# Patient Record
Sex: FEMALE | Race: WHITE | NOT HISPANIC OR LATINO | URBAN - METROPOLITAN AREA
[De-identification: names, ages, dates, MRNs, and addresses within clinical notes are randomized per-mention and may not be internally consistent; named-entity substitution may affect disease eponyms.]

---

## 2017-01-20 ENCOUNTER — OUTPATIENT (OUTPATIENT)
Dept: OUTPATIENT SERVICES | Facility: HOSPITAL | Age: 67
LOS: 1 days | End: 2017-01-20
Payer: COMMERCIAL

## 2017-01-20 ENCOUNTER — APPOINTMENT (OUTPATIENT)
Dept: CT IMAGING | Facility: CLINIC | Age: 67
End: 2017-01-20

## 2017-01-20 DIAGNOSIS — H26.9 UNSPECIFIED CATARACT: Chronic | ICD-10-CM

## 2017-01-20 DIAGNOSIS — D24.2 BENIGN NEOPLASM OF LEFT BREAST: Chronic | ICD-10-CM

## 2017-01-20 DIAGNOSIS — Z00.8 ENCOUNTER FOR OTHER GENERAL EXAMINATION: ICD-10-CM

## 2017-01-20 PROCEDURE — 62323 NJX INTERLAMINAR LMBR/SAC: CPT

## 2017-01-24 ENCOUNTER — OTHER (OUTPATIENT)
Age: 67
End: 2017-01-24

## 2017-01-24 ENCOUNTER — RX RENEWAL (OUTPATIENT)
Age: 67
End: 2017-01-24

## 2017-01-26 ENCOUNTER — APPOINTMENT (OUTPATIENT)
Dept: ORTHOPEDIC SURGERY | Facility: CLINIC | Age: 67
End: 2017-01-26

## 2017-01-26 VITALS
BODY MASS INDEX: 26.46 KG/M2 | SYSTOLIC BLOOD PRESSURE: 107 MMHG | WEIGHT: 155 LBS | HEART RATE: 82 BPM | HEIGHT: 64 IN | DIASTOLIC BLOOD PRESSURE: 65 MMHG

## 2017-02-13 ENCOUNTER — OTHER (OUTPATIENT)
Age: 67
End: 2017-02-13

## 2017-02-16 ENCOUNTER — APPOINTMENT (OUTPATIENT)
Dept: ORTHOPEDIC SURGERY | Facility: CLINIC | Age: 67
End: 2017-02-16

## 2017-02-16 VITALS
BODY MASS INDEX: 26.46 KG/M2 | SYSTOLIC BLOOD PRESSURE: 110 MMHG | HEIGHT: 64 IN | WEIGHT: 155 LBS | DIASTOLIC BLOOD PRESSURE: 71 MMHG | HEART RATE: 84 BPM

## 2017-02-16 DIAGNOSIS — M47.816 SPONDYLOSIS W/OUT MYELOPATHY OR RADICULOPATHY, LUMBAR REGION: ICD-10-CM

## 2017-02-17 ENCOUNTER — APPOINTMENT (OUTPATIENT)
Dept: ORTHOPEDIC SURGERY | Facility: CLINIC | Age: 67
End: 2017-02-17

## 2017-03-01 ENCOUNTER — APPOINTMENT (OUTPATIENT)
Dept: DERMATOLOGY | Facility: CLINIC | Age: 67
End: 2017-03-01

## 2017-03-02 ENCOUNTER — OUTPATIENT (OUTPATIENT)
Dept: OUTPATIENT SERVICES | Facility: HOSPITAL | Age: 67
LOS: 1 days | End: 2017-03-02
Payer: COMMERCIAL

## 2017-03-02 ENCOUNTER — APPOINTMENT (OUTPATIENT)
Dept: ULTRASOUND IMAGING | Facility: CLINIC | Age: 67
End: 2017-03-02

## 2017-03-02 ENCOUNTER — APPOINTMENT (OUTPATIENT)
Dept: CT IMAGING | Facility: CLINIC | Age: 67
End: 2017-03-02

## 2017-03-02 DIAGNOSIS — D24.2 BENIGN NEOPLASM OF LEFT BREAST: Chronic | ICD-10-CM

## 2017-03-02 DIAGNOSIS — Z00.8 ENCOUNTER FOR OTHER GENERAL EXAMINATION: ICD-10-CM

## 2017-03-02 DIAGNOSIS — H26.9 UNSPECIFIED CATARACT: Chronic | ICD-10-CM

## 2017-03-02 PROCEDURE — 20552 NJX 1/MLT TRIGGER POINT 1/2: CPT

## 2017-03-02 PROCEDURE — 77012 CT SCAN FOR NEEDLE BIOPSY: CPT

## 2017-03-02 PROCEDURE — G0260: CPT

## 2017-03-06 ENCOUNTER — APPOINTMENT (OUTPATIENT)
Dept: DERMATOLOGY | Facility: CLINIC | Age: 67
End: 2017-03-06

## 2017-03-08 ENCOUNTER — APPOINTMENT (OUTPATIENT)
Dept: PULMONOLOGY | Facility: CLINIC | Age: 67
End: 2017-03-08

## 2017-03-08 VITALS — HEART RATE: 85 BPM | DIASTOLIC BLOOD PRESSURE: 58 MMHG | SYSTOLIC BLOOD PRESSURE: 116 MMHG | OXYGEN SATURATION: 95 %

## 2017-03-08 VITALS — BODY MASS INDEX: 26.78 KG/M2 | WEIGHT: 156 LBS

## 2017-03-08 RX ORDER — ORPHENADRINE CITRATE 100 MG/1
100 TABLET, EXTENDED RELEASE ORAL
Qty: 10 | Refills: 0 | Status: DISCONTINUED | COMMUNITY
Start: 2016-11-14

## 2017-03-08 RX ORDER — AMOXICILLIN 500 MG/1
500 CAPSULE ORAL
Qty: 20 | Refills: 0 | Status: DISCONTINUED | COMMUNITY
Start: 2017-01-26

## 2017-03-08 RX ORDER — NAPROXEN 500 MG/1
500 TABLET ORAL
Qty: 20 | Refills: 0 | Status: DISCONTINUED | COMMUNITY
Start: 2016-11-14

## 2017-03-30 ENCOUNTER — APPOINTMENT (OUTPATIENT)
Dept: ORTHOPEDIC SURGERY | Facility: CLINIC | Age: 67
End: 2017-03-30

## 2017-05-18 ENCOUNTER — FORM ENCOUNTER (OUTPATIENT)
Age: 67
End: 2017-05-18

## 2017-05-18 ENCOUNTER — APPOINTMENT (OUTPATIENT)
Dept: ORTHOPEDIC SURGERY | Facility: CLINIC | Age: 67
End: 2017-05-18

## 2017-05-18 VITALS
HEART RATE: 88 BPM | HEIGHT: 64 IN | WEIGHT: 156 LBS | BODY MASS INDEX: 26.63 KG/M2 | DIASTOLIC BLOOD PRESSURE: 67 MMHG | SYSTOLIC BLOOD PRESSURE: 97 MMHG

## 2017-05-18 DIAGNOSIS — M70.61 TROCHANTERIC BURSITIS, RIGHT HIP: ICD-10-CM

## 2017-05-18 DIAGNOSIS — M54.41 LUMBAGO WITH SCIATICA, RIGHT SIDE: ICD-10-CM

## 2017-05-19 ENCOUNTER — APPOINTMENT (OUTPATIENT)
Dept: CT IMAGING | Facility: CLINIC | Age: 67
End: 2017-05-19

## 2017-05-19 ENCOUNTER — APPOINTMENT (OUTPATIENT)
Dept: MRI IMAGING | Facility: CLINIC | Age: 67
End: 2017-05-19

## 2017-05-19 ENCOUNTER — OUTPATIENT (OUTPATIENT)
Dept: OUTPATIENT SERVICES | Facility: HOSPITAL | Age: 67
LOS: 1 days | End: 2017-05-19
Payer: COMMERCIAL

## 2017-05-19 ENCOUNTER — OUTPATIENT (OUTPATIENT)
Dept: OUTPATIENT SERVICES | Facility: HOSPITAL | Age: 67
LOS: 1 days | End: 2017-05-19

## 2017-05-19 DIAGNOSIS — M47.816 SPONDYLOSIS WITHOUT MYELOPATHY OR RADICULOPATHY, LUMBAR REGION: ICD-10-CM

## 2017-05-19 DIAGNOSIS — H26.9 UNSPECIFIED CATARACT: Chronic | ICD-10-CM

## 2017-05-19 DIAGNOSIS — M54.41 LUMBAGO WITH SCIATICA, RIGHT SIDE: ICD-10-CM

## 2017-05-19 DIAGNOSIS — D24.2 BENIGN NEOPLASM OF LEFT BREAST: Chronic | ICD-10-CM

## 2017-05-19 PROCEDURE — 72131 CT LUMBAR SPINE W/O DYE: CPT

## 2017-05-30 ENCOUNTER — APPOINTMENT (OUTPATIENT)
Dept: ORTHOPEDIC SURGERY | Facility: CLINIC | Age: 67
End: 2017-05-30

## 2017-06-15 ENCOUNTER — APPOINTMENT (OUTPATIENT)
Dept: PULMONOLOGY | Facility: CLINIC | Age: 67
End: 2017-06-15

## 2017-06-15 VITALS
OXYGEN SATURATION: 96 % | HEIGHT: 64 IN | DIASTOLIC BLOOD PRESSURE: 76 MMHG | SYSTOLIC BLOOD PRESSURE: 124 MMHG | HEART RATE: 78 BPM | BODY MASS INDEX: 27.12 KG/M2

## 2017-06-15 VITALS — BODY MASS INDEX: 27.12 KG/M2 | WEIGHT: 158 LBS

## 2017-07-31 ENCOUNTER — OUTPATIENT (OUTPATIENT)
Dept: OUTPATIENT SERVICES | Facility: HOSPITAL | Age: 67
LOS: 1 days | End: 2017-07-31
Payer: COMMERCIAL

## 2017-07-31 DIAGNOSIS — M47.816 SPONDYLOSIS WITHOUT MYELOPATHY OR RADICULOPATHY, LUMBAR REGION: ICD-10-CM

## 2017-07-31 DIAGNOSIS — D24.2 BENIGN NEOPLASM OF LEFT BREAST: Chronic | ICD-10-CM

## 2017-07-31 DIAGNOSIS — Z51.89 ENCOUNTER FOR OTHER SPECIFIED AFTERCARE: ICD-10-CM

## 2017-07-31 DIAGNOSIS — H26.9 UNSPECIFIED CATARACT: Chronic | ICD-10-CM

## 2017-07-31 DIAGNOSIS — M54.5 LOW BACK PAIN: ICD-10-CM

## 2017-08-17 PROCEDURE — 97010 HOT OR COLD PACKS THERAPY: CPT

## 2017-08-17 PROCEDURE — 97110 THERAPEUTIC EXERCISES: CPT

## 2017-08-17 PROCEDURE — 97140 MANUAL THERAPY 1/> REGIONS: CPT

## 2017-09-06 ENCOUNTER — APPOINTMENT (OUTPATIENT)
Dept: DERMATOLOGY | Facility: CLINIC | Age: 67
End: 2017-09-06
Payer: COMMERCIAL

## 2017-09-06 PROCEDURE — 99213 OFFICE O/P EST LOW 20 MIN: CPT

## 2017-09-08 ENCOUNTER — OTHER (OUTPATIENT)
Age: 67
End: 2017-09-08

## 2017-09-20 ENCOUNTER — APPOINTMENT (OUTPATIENT)
Dept: MAMMOGRAPHY | Facility: CLINIC | Age: 67
End: 2017-09-20

## 2017-09-20 ENCOUNTER — OUTPATIENT (OUTPATIENT)
Dept: OUTPATIENT SERVICES | Facility: HOSPITAL | Age: 67
LOS: 1 days | End: 2017-09-20
Payer: COMMERCIAL

## 2017-09-20 DIAGNOSIS — Z00.8 ENCOUNTER FOR OTHER GENERAL EXAMINATION: ICD-10-CM

## 2017-09-20 DIAGNOSIS — H26.9 UNSPECIFIED CATARACT: Chronic | ICD-10-CM

## 2017-09-20 DIAGNOSIS — D24.2 BENIGN NEOPLASM OF LEFT BREAST: Chronic | ICD-10-CM

## 2017-09-20 PROCEDURE — 77063 BREAST TOMOSYNTHESIS BI: CPT | Mod: 26

## 2017-09-20 PROCEDURE — 77067 SCR MAMMO BI INCL CAD: CPT

## 2017-09-20 PROCEDURE — 77063 BREAST TOMOSYNTHESIS BI: CPT

## 2017-09-20 PROCEDURE — 76641 ULTRASOUND BREAST COMPLETE: CPT | Mod: 26,50

## 2017-09-20 PROCEDURE — G0202: CPT | Mod: 26

## 2017-09-20 PROCEDURE — 76641 ULTRASOUND BREAST COMPLETE: CPT

## 2017-11-06 ENCOUNTER — APPOINTMENT (OUTPATIENT)
Dept: PULMONOLOGY | Facility: CLINIC | Age: 67
End: 2017-11-06
Payer: COMMERCIAL

## 2017-11-06 VITALS
RESPIRATION RATE: 16 BRPM | HEART RATE: 77 BPM | SYSTOLIC BLOOD PRESSURE: 124 MMHG | DIASTOLIC BLOOD PRESSURE: 80 MMHG | OXYGEN SATURATION: 97 %

## 2017-11-06 VITALS — WEIGHT: 150 LBS | BODY MASS INDEX: 25.75 KG/M2

## 2017-11-06 PROCEDURE — 94010 BREATHING CAPACITY TEST: CPT

## 2017-11-06 PROCEDURE — 99214 OFFICE O/P EST MOD 30 MIN: CPT | Mod: 25

## 2017-11-08 ENCOUNTER — OUTPATIENT (OUTPATIENT)
Dept: OUTPATIENT SERVICES | Facility: HOSPITAL | Age: 67
LOS: 1 days | End: 2017-11-08
Payer: COMMERCIAL

## 2017-11-08 DIAGNOSIS — D24.2 BENIGN NEOPLASM OF LEFT BREAST: Chronic | ICD-10-CM

## 2017-11-08 DIAGNOSIS — H26.9 UNSPECIFIED CATARACT: Chronic | ICD-10-CM

## 2017-11-08 DIAGNOSIS — M54.16 RADICULOPATHY, LUMBAR REGION: ICD-10-CM

## 2017-11-08 DIAGNOSIS — Z51.89 ENCOUNTER FOR OTHER SPECIFIED AFTERCARE: ICD-10-CM

## 2017-11-27 ENCOUNTER — RX RENEWAL (OUTPATIENT)
Age: 67
End: 2017-11-27

## 2017-12-07 PROCEDURE — 97163 PT EVAL HIGH COMPLEX 45 MIN: CPT

## 2017-12-07 PROCEDURE — 97010 HOT OR COLD PACKS THERAPY: CPT

## 2017-12-07 PROCEDURE — 97110 THERAPEUTIC EXERCISES: CPT

## 2017-12-11 ENCOUNTER — RESULT REVIEW (OUTPATIENT)
Age: 67
End: 2017-12-11

## 2017-12-14 ENCOUNTER — APPOINTMENT (OUTPATIENT)
Dept: RADIOLOGY | Facility: CLINIC | Age: 67
End: 2017-12-14

## 2017-12-14 ENCOUNTER — APPOINTMENT (OUTPATIENT)
Dept: ULTRASOUND IMAGING | Facility: CLINIC | Age: 67
End: 2017-12-14

## 2017-12-14 ENCOUNTER — OUTPATIENT (OUTPATIENT)
Dept: OUTPATIENT SERVICES | Facility: HOSPITAL | Age: 67
LOS: 1 days | End: 2017-12-14
Payer: COMMERCIAL

## 2017-12-14 DIAGNOSIS — Z00.8 ENCOUNTER FOR OTHER GENERAL EXAMINATION: ICD-10-CM

## 2017-12-14 DIAGNOSIS — D24.2 BENIGN NEOPLASM OF LEFT BREAST: Chronic | ICD-10-CM

## 2017-12-14 DIAGNOSIS — H26.9 UNSPECIFIED CATARACT: Chronic | ICD-10-CM

## 2017-12-14 PROCEDURE — 76642 ULTRASOUND BREAST LIMITED: CPT | Mod: 26,RT

## 2017-12-14 PROCEDURE — 76642 ULTRASOUND BREAST LIMITED: CPT

## 2017-12-14 PROCEDURE — 77080 DXA BONE DENSITY AXIAL: CPT | Mod: 26

## 2017-12-14 PROCEDURE — 77080 DXA BONE DENSITY AXIAL: CPT

## 2017-12-19 DIAGNOSIS — M85.852 OTHER SPECIFIED DISORDERS OF BONE DENSITY AND STRUCTURE, LEFT THIGH: ICD-10-CM

## 2017-12-19 DIAGNOSIS — R92.8 OTHER ABNORMAL AND INCONCLUSIVE FINDINGS ON DIAGNOSTIC IMAGING OF BREAST: ICD-10-CM

## 2018-03-19 ENCOUNTER — APPOINTMENT (OUTPATIENT)
Dept: FAMILY MEDICINE | Facility: CLINIC | Age: 68
End: 2018-03-19

## 2018-04-19 ENCOUNTER — NON-APPOINTMENT (OUTPATIENT)
Age: 68
End: 2018-04-19

## 2018-04-19 ENCOUNTER — APPOINTMENT (OUTPATIENT)
Dept: FAMILY MEDICINE | Facility: CLINIC | Age: 68
End: 2018-04-19
Payer: COMMERCIAL

## 2018-04-19 VITALS
HEART RATE: 80 BPM | BODY MASS INDEX: 25.1 KG/M2 | HEIGHT: 64 IN | WEIGHT: 147 LBS | DIASTOLIC BLOOD PRESSURE: 80 MMHG | SYSTOLIC BLOOD PRESSURE: 122 MMHG

## 2018-04-19 DIAGNOSIS — K29.70 GASTRITIS, UNSPECIFIED, W/OUT BLEEDING: ICD-10-CM

## 2018-04-19 DIAGNOSIS — Z83.42 FAMILY HISTORY OF FAMILIAL HYPERCHOLESTEROLEMIA: ICD-10-CM

## 2018-04-19 DIAGNOSIS — Z82.49 FAMILY HISTORY OF ISCHEMIC HEART DISEASE AND OTHER DISEASES OF THE CIRCULATORY SYSTEM: ICD-10-CM

## 2018-04-19 PROCEDURE — 99387 INIT PM E/M NEW PAT 65+ YRS: CPT | Mod: 25

## 2018-04-19 PROCEDURE — 93000 ELECTROCARDIOGRAM COMPLETE: CPT

## 2018-04-19 RX ORDER — TIZANIDINE 2 MG/1
2 TABLET ORAL
Qty: 45 | Refills: 0 | Status: DISCONTINUED | COMMUNITY
Start: 2017-02-16 | End: 2018-04-19

## 2018-04-19 RX ORDER — KETOROLAC TROMETHAMINE 10 MG/1
10 TABLET, FILM COATED ORAL 3 TIMES DAILY
Qty: 15 | Refills: 0 | Status: DISCONTINUED | COMMUNITY
Start: 2017-01-26 | End: 2018-04-19

## 2018-04-19 RX ORDER — MELOXICAM 15 MG/1
15 TABLET ORAL
Qty: 15 | Refills: 0 | Status: DISCONTINUED | COMMUNITY
Start: 2017-01-24 | End: 2018-04-19

## 2018-04-27 LAB — HEMOCCULT STL QL IA: NEGATIVE

## 2018-05-11 ENCOUNTER — APPOINTMENT (OUTPATIENT)
Dept: PULMONOLOGY | Facility: CLINIC | Age: 68
End: 2018-05-11
Payer: COMMERCIAL

## 2018-05-11 VITALS
SYSTOLIC BLOOD PRESSURE: 110 MMHG | DIASTOLIC BLOOD PRESSURE: 74 MMHG | OXYGEN SATURATION: 99 % | HEART RATE: 72 BPM | RESPIRATION RATE: 16 BRPM

## 2018-05-11 VITALS — BODY MASS INDEX: 24.98 KG/M2 | WEIGHT: 145.5 LBS

## 2018-05-11 PROCEDURE — 94010 BREATHING CAPACITY TEST: CPT

## 2018-05-11 PROCEDURE — 99214 OFFICE O/P EST MOD 30 MIN: CPT | Mod: 25

## 2018-06-23 ENCOUNTER — APPOINTMENT (OUTPATIENT)
Dept: RADIOLOGY | Facility: CLINIC | Age: 68
End: 2018-06-23

## 2018-06-23 ENCOUNTER — OUTPATIENT (OUTPATIENT)
Dept: OUTPATIENT SERVICES | Facility: HOSPITAL | Age: 68
LOS: 1 days | End: 2018-06-23
Payer: COMMERCIAL

## 2018-06-23 DIAGNOSIS — M25.571 PAIN IN RIGHT ANKLE AND JOINTS OF RIGHT FOOT: ICD-10-CM

## 2018-06-23 DIAGNOSIS — D24.2 BENIGN NEOPLASM OF LEFT BREAST: Chronic | ICD-10-CM

## 2018-06-23 DIAGNOSIS — H26.9 UNSPECIFIED CATARACT: Chronic | ICD-10-CM

## 2018-06-23 PROCEDURE — 73610 X-RAY EXAM OF ANKLE: CPT

## 2018-06-23 PROCEDURE — 73610 X-RAY EXAM OF ANKLE: CPT | Mod: 26,RT

## 2018-06-24 ENCOUNTER — TRANSCRIPTION ENCOUNTER (OUTPATIENT)
Age: 68
End: 2018-06-24

## 2018-06-28 ENCOUNTER — APPOINTMENT (OUTPATIENT)
Dept: ORTHOPEDIC SURGERY | Facility: CLINIC | Age: 68
End: 2018-06-28

## 2018-07-10 ENCOUNTER — RX RENEWAL (OUTPATIENT)
Age: 68
End: 2018-07-10

## 2018-07-13 ENCOUNTER — MEDICATION RENEWAL (OUTPATIENT)
Age: 68
End: 2018-07-13

## 2018-09-06 ENCOUNTER — APPOINTMENT (OUTPATIENT)
Dept: CARDIOLOGY | Facility: CLINIC | Age: 68
End: 2018-09-06
Payer: COMMERCIAL

## 2018-09-06 ENCOUNTER — MEDICATION RENEWAL (OUTPATIENT)
Age: 68
End: 2018-09-06

## 2018-09-06 ENCOUNTER — NON-APPOINTMENT (OUTPATIENT)
Age: 68
End: 2018-09-06

## 2018-09-06 VITALS — DIASTOLIC BLOOD PRESSURE: 60 MMHG | SYSTOLIC BLOOD PRESSURE: 100 MMHG

## 2018-09-06 VITALS
OXYGEN SATURATION: 95 % | HEIGHT: 64 IN | SYSTOLIC BLOOD PRESSURE: 94 MMHG | WEIGHT: 144 LBS | BODY MASS INDEX: 24.59 KG/M2 | HEART RATE: 83 BPM | DIASTOLIC BLOOD PRESSURE: 56 MMHG

## 2018-09-06 PROCEDURE — 93000 ELECTROCARDIOGRAM COMPLETE: CPT

## 2018-09-06 PROCEDURE — 99245 OFF/OP CONSLTJ NEW/EST HI 55: CPT

## 2018-09-06 PROCEDURE — 93306 TTE W/DOPPLER COMPLETE: CPT

## 2018-09-11 ENCOUNTER — TRANSCRIPTION ENCOUNTER (OUTPATIENT)
Age: 68
End: 2018-09-11

## 2018-09-25 ENCOUNTER — OUTPATIENT (OUTPATIENT)
Dept: OUTPATIENT SERVICES | Facility: HOSPITAL | Age: 68
LOS: 1 days | End: 2018-09-25
Payer: COMMERCIAL

## 2018-09-25 DIAGNOSIS — D24.2 BENIGN NEOPLASM OF LEFT BREAST: Chronic | ICD-10-CM

## 2018-09-25 DIAGNOSIS — Z00.00 ENCOUNTER FOR GENERAL ADULT MEDICAL EXAMINATION WITHOUT ABNORMAL FINDINGS: ICD-10-CM

## 2018-09-25 DIAGNOSIS — H26.9 UNSPECIFIED CATARACT: Chronic | ICD-10-CM

## 2018-09-25 PROCEDURE — 77067 SCR MAMMO BI INCL CAD: CPT

## 2018-09-25 PROCEDURE — 76641 ULTRASOUND BREAST COMPLETE: CPT | Mod: 26,50

## 2018-09-25 PROCEDURE — 77063 BREAST TOMOSYNTHESIS BI: CPT | Mod: 26

## 2018-09-25 PROCEDURE — 77063 BREAST TOMOSYNTHESIS BI: CPT

## 2018-09-25 PROCEDURE — 77067 SCR MAMMO BI INCL CAD: CPT | Mod: 26

## 2018-09-25 PROCEDURE — 76641 ULTRASOUND BREAST COMPLETE: CPT

## 2018-09-25 RX ORDER — MOMETASONE FUROATE 220 UG/1
220 INHALANT RESPIRATORY (INHALATION)
Qty: 3 | Refills: 1 | Status: DISCONTINUED | COMMUNITY
Start: 2017-06-15 | End: 2018-09-25

## 2018-09-28 ENCOUNTER — APPOINTMENT (OUTPATIENT)
Dept: CARDIOLOGY | Facility: CLINIC | Age: 68
End: 2018-09-28
Payer: COMMERCIAL

## 2018-09-28 PROCEDURE — 93015 CV STRESS TEST SUPVJ I&R: CPT

## 2018-10-12 ENCOUNTER — TRANSCRIPTION ENCOUNTER (OUTPATIENT)
Age: 68
End: 2018-10-12

## 2018-10-15 ENCOUNTER — RESULT REVIEW (OUTPATIENT)
Age: 68
End: 2018-10-15

## 2018-11-05 ENCOUNTER — APPOINTMENT (OUTPATIENT)
Dept: DERMATOLOGY | Facility: CLINIC | Age: 68
End: 2018-11-05
Payer: COMMERCIAL

## 2018-11-05 PROCEDURE — 99213 OFFICE O/P EST LOW 20 MIN: CPT

## 2018-11-12 ENCOUNTER — APPOINTMENT (OUTPATIENT)
Dept: CARDIOLOGY | Facility: CLINIC | Age: 68
End: 2018-11-12

## 2018-11-15 ENCOUNTER — APPOINTMENT (OUTPATIENT)
Dept: HEART AND VASCULAR | Facility: CLINIC | Age: 68
End: 2018-11-15
Payer: COMMERCIAL

## 2018-11-15 VITALS
DIASTOLIC BLOOD PRESSURE: 68 MMHG | SYSTOLIC BLOOD PRESSURE: 112 MMHG | BODY MASS INDEX: 24.75 KG/M2 | HEART RATE: 83 BPM | HEIGHT: 64 IN | WEIGHT: 145 LBS

## 2018-11-15 PROCEDURE — 99244 OFF/OP CNSLTJ NEW/EST MOD 40: CPT

## 2018-11-15 NOTE — HISTORY OF PRESENT ILLNESS
[FreeTextEntry1] : 67 yo woman with a history of posterior mitral valve leaflet prolapse with MR that was previously read as severe and more recently mod-severe with severely dilated LA, frequent PVC's and history of long-standing non-sustained VT who presents for evaluation. The ectopy is pretty random and she does not note any associated symptoms with it. She is very active and can walk unlimited on flat ground without symptoms. She chooses not to use stairs as much, but will will note dyspnea after two flights. She denies any orthopnea, PND, syncope, pedal edema. \par \par She has been on Vytorin for many years. No recent blood draw. \par \par FH- No FH of premature CAD. \par \par 9/2018- EKG- NSR @ 85, nl axis, NSCD, nonspecific ST-T changes\par \par Treadmill Stress Test- 9+ minutes, 10 METS, flat BP response, negative treadmill stress test\par Echo- moderate to severe MR, LA volume >60 sq mm/sq m, EF >70%

## 2018-11-15 NOTE — ASSESSMENT
[FreeTextEntry1] : 69 yo woman with a history of MVP with MR that was previously read as severe and more recently mod-severe with severely dilated LA, frequent PVC's and history of long-standing non-sustained VT who presents for evaluation. \par \par -Will get a stress echo to assess her PA pressures on exertion and to assess exercise capacity. She feels that on the day of her regular treadmill stress test, she still had leg pain and could not push herself fully. \par -BP's appear to be very well controlled. \par -Return in 6 months for follow-up visit and echo. \par

## 2018-11-15 NOTE — PHYSICAL EXAM
[General Appearance - Well Developed] : well developed [Normal Appearance] : normal appearance [Well Groomed] : well groomed [General Appearance - Well Nourished] : well nourished [No Deformities] : no deformities [General Appearance - In No Acute Distress] : no acute distress [Normal Conjunctiva] : the conjunctiva exhibited no abnormalities [Eyelids - No Xanthelasma] : the eyelids demonstrated no xanthelasmas [Normal Oral Mucosa] : normal oral mucosa [No Oral Pallor] : no oral pallor [No Oral Cyanosis] : no oral cyanosis [Heart Rate And Rhythm] : heart rate and rhythm were normal [Heart Sounds] : normal S1 and S2 [Respiration, Rhythm And Depth] : normal respiratory rhythm and effort [Exaggerated Use Of Accessory Muscles For Inspiration] : no accessory muscle use [Auscultation Breath Sounds / Voice Sounds] : lungs were clear to auscultation bilaterally [Abdomen Soft] : soft [Abdomen Tenderness] : non-tender [Abdomen Mass (___ Cm)] : no abdominal mass palpated [Abnormal Walk] : normal gait [Gait - Sufficient For Exercise Testing] : the gait was sufficient for exercise testing [Nail Clubbing] : no clubbing of the fingernails [Cyanosis, Localized] : no localized cyanosis [Petechial Hemorrhages (___cm)] : no petechial hemorrhages [Skin Color & Pigmentation] : normal skin color and pigmentation [] : no rash [No Venous Stasis] : no venous stasis [Skin Lesions] : no skin lesions [No Skin Ulcers] : no skin ulcer [No Xanthoma] : no  xanthoma was observed [Oriented To Time, Place, And Person] : oriented to person, place, and time [Affect] : the affect was normal [Mood] : the mood was normal [No Anxiety] : not feeling anxious [FreeTextEntry1] : midsystolic click noted when in lateral decubitus position, with III/VI late systolic blowing murmur

## 2018-11-15 NOTE — DISCUSSION/SUMMARY
[FreeTextEntry1] : -Will get a stress echo to assess her PA pressures on exertion and to assess exercise capacity. She feels that on the day of her regular treadmill stress test, she still had leg pain and could not push herself fully. \par -Will also attempt to appreciate degree of MR on baseline echo prior to stress echo. \par -BP's appear to be very well controlled. \par -Basic labs sent. \par -Return in 6 months for follow-up visit and echo. \par

## 2018-11-26 LAB
ALBUMIN SERPL ELPH-MCNC: 4.3 G/DL
ALP BLD-CCNC: 87 U/L
ALT SERPL-CCNC: 17 U/L
ANION GAP SERPL CALC-SCNC: 9 MMOL/L
AST SERPL-CCNC: 19 U/L
BASOPHILS # BLD AUTO: 0.04 K/UL
BASOPHILS NFR BLD AUTO: 0.8 %
BILIRUB SERPL-MCNC: 0.2 MG/DL
BUN SERPL-MCNC: 18 MG/DL
CALCIUM SERPL-MCNC: 10 MG/DL
CHLORIDE SERPL-SCNC: 106 MMOL/L
CHOLEST SERPL-MCNC: 161 MG/DL
CHOLEST/HDLC SERPL: 2.5 RATIO
CO2 SERPL-SCNC: 26 MMOL/L
CREAT SERPL-MCNC: 0.69 MG/DL
EOSINOPHIL # BLD AUTO: 0.28 K/UL
EOSINOPHIL NFR BLD AUTO: 5.3 %
GLUCOSE SERPL-MCNC: 102 MG/DL
HBA1C MFR BLD HPLC: 6 %
HCT VFR BLD CALC: 44.9 %
HDLC SERPL-MCNC: 65 MG/DL
HGB BLD-MCNC: 14.5 G/DL
IMM GRANULOCYTES NFR BLD AUTO: 0.2 %
LDLC SERPL CALC-MCNC: 80 MG/DL
LYMPHOCYTES # BLD AUTO: 2.33 K/UL
LYMPHOCYTES NFR BLD AUTO: 44 %
MAN DIFF?: NORMAL
MCHC RBC-ENTMCNC: 29.2 PG
MCHC RBC-ENTMCNC: 32.3 GM/DL
MCV RBC AUTO: 90.3 FL
MONOCYTES # BLD AUTO: 0.6 K/UL
MONOCYTES NFR BLD AUTO: 11.3 %
NEUTROPHILS # BLD AUTO: 2.04 K/UL
NEUTROPHILS NFR BLD AUTO: 38.4 %
PLATELET # BLD AUTO: 285 K/UL
POTASSIUM SERPL-SCNC: 4.7 MMOL/L
PROT SERPL-MCNC: 7.4 G/DL
RBC # BLD: 4.97 M/UL
RBC # FLD: 13.7 %
SODIUM SERPL-SCNC: 141 MMOL/L
T4 FREE SERPL-MCNC: 1.1 NG/DL
TRIGL SERPL-MCNC: 82 MG/DL
TSH SERPL-ACNC: 3.36 UIU/ML
WBC # FLD AUTO: 5.3 K/UL

## 2018-11-28 ENCOUNTER — RECORD ABSTRACTING (OUTPATIENT)
Age: 68
End: 2018-11-28

## 2018-11-28 DIAGNOSIS — Z83.3 FAMILY HISTORY OF DIABETES MELLITUS: ICD-10-CM

## 2018-11-28 LAB — CYTOLOGY CVX/VAG DOC THIN PREP: NORMAL

## 2018-12-05 ENCOUNTER — TRANSCRIPTION ENCOUNTER (OUTPATIENT)
Age: 68
End: 2018-12-05

## 2018-12-05 ENCOUNTER — MEDICATION RENEWAL (OUTPATIENT)
Age: 68
End: 2018-12-05

## 2018-12-12 ENCOUNTER — APPOINTMENT (OUTPATIENT)
Dept: OBGYN | Facility: CLINIC | Age: 68
End: 2018-12-12
Payer: COMMERCIAL

## 2018-12-12 VITALS
WEIGHT: 143 LBS | DIASTOLIC BLOOD PRESSURE: 70 MMHG | HEIGHT: 64 IN | BODY MASS INDEX: 24.41 KG/M2 | SYSTOLIC BLOOD PRESSURE: 120 MMHG

## 2018-12-12 DIAGNOSIS — R31.29 OTHER MICROSCOPIC HEMATURIA: ICD-10-CM

## 2018-12-12 DIAGNOSIS — Z87.898 PERSONAL HISTORY OF OTHER SPECIFIED CONDITIONS: ICD-10-CM

## 2018-12-12 DIAGNOSIS — N95.1 MENOPAUSAL AND FEMALE CLIMACTERIC STATES: ICD-10-CM

## 2018-12-12 DIAGNOSIS — Z01.419 ENCOUNTER FOR GYNECOLOGICAL EXAMINATION (GENERAL) (ROUTINE) W/OUT ABNORMAL FINDINGS: ICD-10-CM

## 2018-12-12 LAB
BILIRUB UR QL STRIP: NORMAL
COLLECTION METHOD: NORMAL
GLUCOSE UR-MCNC: NORMAL
HCG UR QL: 0.2 EU/DL
HGB UR QL STRIP.AUTO: NORMAL
KETONES UR-MCNC: NORMAL
LEUKOCYTE ESTERASE UR QL STRIP: NORMAL
NITRITE UR QL STRIP: NORMAL
PH UR STRIP: 5.5
PROT UR STRIP-MCNC: NORMAL
SP GR UR STRIP: 1.02

## 2018-12-12 PROCEDURE — 99397 PER PM REEVAL EST PAT 65+ YR: CPT

## 2018-12-12 PROCEDURE — 81002 URINALYSIS NONAUTO W/O SCOPE: CPT

## 2018-12-17 LAB — CYTOLOGY CVX/VAG DOC THIN PREP: NORMAL

## 2018-12-26 ENCOUNTER — RX RENEWAL (OUTPATIENT)
Age: 68
End: 2018-12-26

## 2019-01-08 ENCOUNTER — MEDICATION RENEWAL (OUTPATIENT)
Age: 69
End: 2019-01-08

## 2019-01-22 ENCOUNTER — NON-APPOINTMENT (OUTPATIENT)
Age: 69
End: 2019-01-22

## 2019-01-22 ENCOUNTER — APPOINTMENT (OUTPATIENT)
Dept: CARDIOLOGY | Facility: CLINIC | Age: 69
End: 2019-01-22
Payer: COMMERCIAL

## 2019-01-22 VITALS
WEIGHT: 145 LBS | BODY MASS INDEX: 24.75 KG/M2 | HEIGHT: 64 IN | HEART RATE: 75 BPM | DIASTOLIC BLOOD PRESSURE: 82 MMHG | SYSTOLIC BLOOD PRESSURE: 129 MMHG | OXYGEN SATURATION: 95 %

## 2019-01-22 PROCEDURE — 99214 OFFICE O/P EST MOD 30 MIN: CPT

## 2019-01-22 PROCEDURE — 99205 OFFICE O/P NEW HI 60 MIN: CPT

## 2019-01-22 NOTE — PHYSICAL EXAM
[General Appearance - Well Developed] : well developed [Normal Appearance] : normal appearance [Well Groomed] : well groomed [General Appearance - Well Nourished] : well nourished [No Deformities] : no deformities [General Appearance - In No Acute Distress] : no acute distress [Normal Conjunctiva] : the conjunctiva exhibited no abnormalities [Eyelids - No Xanthelasma] : the eyelids demonstrated no xanthelasmas [Normal Oral Mucosa] : normal oral mucosa [No Oral Pallor] : no oral pallor [No Oral Cyanosis] : no oral cyanosis [Normal Jugular Venous A Waves Present] : normal jugular venous A waves present [Normal Jugular Venous V Waves Present] : normal jugular venous V waves present [No Jugular Venous Mario A Waves] : no jugular venous mario A waves [Heart Rate And Rhythm] : heart rate and rhythm were normal [Heart Sounds] : normal S1 and S2 [Systolic grade ___/6] : A grade [unfilled]/6 systolic murmur was heard. [Respiration, Rhythm And Depth] : normal respiratory rhythm and effort [Exaggerated Use Of Accessory Muscles For Inspiration] : no accessory muscle use [Auscultation Breath Sounds / Voice Sounds] : lungs were clear to auscultation bilaterally [Abdomen Soft] : soft [Abdomen Tenderness] : non-tender [Abdomen Mass (___ Cm)] : no abdominal mass palpated [Abnormal Walk] : normal gait [Gait - Sufficient For Exercise Testing] : the gait was sufficient for exercise testing [Nail Clubbing] : no clubbing of the fingernails [Cyanosis, Localized] : no localized cyanosis [Petechial Hemorrhages (___cm)] : no petechial hemorrhages [Skin Color & Pigmentation] : normal skin color and pigmentation [] : no rash [No Venous Stasis] : no venous stasis [Skin Lesions] : no skin lesions [No Skin Ulcers] : no skin ulcer [No Xanthoma] : no  xanthoma was observed [Oriented To Time, Place, And Person] : oriented to person, place, and time [Affect] : the affect was normal [Mood] : the mood was normal [No Anxiety] : not feeling anxious

## 2019-01-23 NOTE — DISCUSSION/SUMMARY
[FreeTextEntry1] : Echo was reviewed and shows severe MR with myxomatous changes of both leaflets and left atrial enlargement.  LV function is preserved.  Because she is a good candidate for surgery and LV function is preserved, I advised that she was an excellent candidate for surgical repair and that this would be a much better alternative to MitraClip which is indicated for patients at high surgical risk or severe functional MR.

## 2019-01-24 ENCOUNTER — TRANSCRIPTION ENCOUNTER (OUTPATIENT)
Age: 69
End: 2019-01-24

## 2019-01-25 ENCOUNTER — RX RENEWAL (OUTPATIENT)
Age: 69
End: 2019-01-25

## 2019-01-25 ENCOUNTER — APPOINTMENT (OUTPATIENT)
Dept: CARDIOTHORACIC SURGERY | Facility: CLINIC | Age: 69
End: 2019-01-25
Payer: COMMERCIAL

## 2019-01-25 ENCOUNTER — MEDICATION RENEWAL (OUTPATIENT)
Age: 69
End: 2019-01-25

## 2019-01-25 ENCOUNTER — TRANSCRIPTION ENCOUNTER (OUTPATIENT)
Age: 69
End: 2019-01-25

## 2019-01-25 VITALS
HEIGHT: 64 IN | BODY MASS INDEX: 24.75 KG/M2 | SYSTOLIC BLOOD PRESSURE: 156 MMHG | DIASTOLIC BLOOD PRESSURE: 80 MMHG | WEIGHT: 145 LBS | TEMPERATURE: 98.2 F

## 2019-01-25 VITALS — HEART RATE: 76 BPM | OXYGEN SATURATION: 96 % | RESPIRATION RATE: 13 BRPM

## 2019-01-25 PROCEDURE — 99243 OFF/OP CNSLTJ NEW/EST LOW 30: CPT

## 2019-01-25 NOTE — DATA REVIEWED
[FreeTextEntry1] : Echo was reviewed and shows severe MR with myxomatous changes of both leaflets and left atrial enlargement. LV function is preserved.

## 2019-01-25 NOTE — ASSESSMENT
[FreeTextEntry1] : Amrita is a 68 year old female with PMHx of asthma, HLD, PVCs, and MVP with severe MR. She has followed with her primary care physician. Recent echocardiogram revealed her known MR has progressed by echo;  LVEF has been >60% with no dilatation. LA is severely dilated however. She was referred for surgical consultation and further management. She denies GAYTAN, orthopnea, chest pain, dizziness or syncope. \par \par Imaging results reviewed with patient. There is room to wait on surgical intervention. I explained that the only down side to wait is developing atrial fibrillation (Ectopy via Holter results) as the left atrium is already enlarged. I would recommend mitral valve repair/replacement and possible a MAZE procedure although she is in normal sinus rhythm to this would be in an attempt to prevent her developing atrial fibrillation. I would close the left atrial appendage. I would not recommend a minimally invasive approach due the need for accessibility to complete the MAZE procedure. \par  \par Plan:\par 1) Elective mitral valve repair/replacement, MAZE and left atrial appendage closure \par 2) She will require a cardiac Catherization

## 2019-01-25 NOTE — HISTORY OF PRESENT ILLNESS
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 Her mitral valve disease has been followed for many years. Recent echocardiogram revealed mild MVP and moderate to severe MR which has progressed. LVEF has been >60% with no dilatation. LA is severely dilated (4.9 cm). She was referred for surgical consultation and further management. She denies PND, orthopnea, chest pain, dizziness or syncope. She endorses intermittent dyspnea on exertion when climbing up stairs. \par  \par

## 2019-01-25 NOTE — CONSULT LETTER
[Dear  ___] : Dear ~JEANNETTE, [Courtesy Letter:] : I had the pleasure of seeing your patient, [unfilled], in my office today. [Please see my note below.] : Please see my note below. [Consult Closing:] : Thank you very much for allowing me to participate in the care of this patient.  If you have any questions, please do not hesitate to contact me. [Sincerely,] : Sincerely, [FreeTextEntry2] : \par Tirso Beltran MD\par [address] [place] [address]\[place] [address] [FreeTextEntry3] : Kobe Bergman MD\par  & \par \par Cardiovascular & Thoracic Surgery\par [facility] \par [address]\par [facility] [address]\par

## 2019-01-25 NOTE — PHYSICAL EXAM
[General Appearance - Alert] : alert [General Appearance - In No Acute Distress] : in no acute distress [Sclera] : the sclera and conjunctiva were normal [PERRL With Normal Accommodation] : pupils were equal in size, round, and reactive to light [Extraocular Movements] : extraocular movements were intact [Outer Ear] : the ears and nose were normal in appearance [Oropharynx] : the oropharynx was normal [Jugular Venous Distention Increased] : there was no jugular-venous distention [Respiration, Rhythm And Depth] : normal respiratory rhythm and effort [Heart Rate And Rhythm] : heart rate was normal and rhythm regular [Chest Visual Inspection Thoracic Asymmetry] : no chest asymmetry [Edema] : there was no peripheral edema [Breast Palpation Mass] : no palpable masses [Bowel Sounds] : normal bowel sounds [Abdomen Soft] : soft [Abdomen Tenderness] : non-tender [No CVA Tenderness] : no ~M costovertebral angle tenderness [No Spinal Tenderness] : no spinal tenderness [Involuntary Movements] : no involuntary movements were seen [Skin Color & Pigmentation] : normal skin color and pigmentation [No Focal Deficits] : no focal deficits [Oriented To Time, Place, And Person] : oriented to person, place, and time [Impaired Insight] : insight and judgment were intact [Affect] : the affect was normal [FreeTextEntry1] : deferred

## 2019-02-05 ENCOUNTER — RECORD ABSTRACTING (OUTPATIENT)
Age: 69
End: 2019-02-05

## 2019-02-05 DIAGNOSIS — Z82.49 FAMILY HISTORY OF ISCHEMIC HEART DISEASE AND OTHER DISEASES OF THE CIRCULATORY SYSTEM: ICD-10-CM

## 2019-02-05 DIAGNOSIS — S93.409A SPRAIN OF UNSPECIFIED LIGAMENT OF UNSPECIFIED ANKLE, INITIAL ENCOUNTER: ICD-10-CM

## 2019-02-05 DIAGNOSIS — Z84.89 FAMILY HISTORY OF OTHER SPECIFIED CONDITIONS: ICD-10-CM

## 2019-02-05 DIAGNOSIS — Z80.0 FAMILY HISTORY OF MALIGNANT NEOPLASM OF DIGESTIVE ORGANS: ICD-10-CM

## 2019-02-05 DIAGNOSIS — S82.831D OTHER FRACTURE OF UPPER AND LOWER END OF RIGHT FIBULA, SUBSEQUENT ENCOUNTER FOR CLOSED FRACTURE WITH ROUTINE HEALING: ICD-10-CM

## 2019-02-05 DIAGNOSIS — S82.64XA NONDISPLACED FRACTURE OF LATERAL MALLEOLUS OF RIGHT FIBULA, INITIAL ENCOUNTER FOR CLOSED FRACTURE: ICD-10-CM

## 2019-02-05 DIAGNOSIS — S82.831A OTHER FRACTURE OF UPPER AND LOWER END OF RIGHT FIBULA, INITIAL ENCOUNTER FOR CLOSED FRACTURE: ICD-10-CM

## 2019-02-12 ENCOUNTER — APPOINTMENT (OUTPATIENT)
Dept: ORTHOPEDIC SURGERY | Facility: CLINIC | Age: 69
End: 2019-02-12

## 2019-02-26 ENCOUNTER — RX RENEWAL (OUTPATIENT)
Age: 69
End: 2019-02-26

## 2019-02-28 ENCOUNTER — MEDICATION RENEWAL (OUTPATIENT)
Age: 69
End: 2019-02-28

## 2019-03-10 ENCOUNTER — TRANSCRIPTION ENCOUNTER (OUTPATIENT)
Age: 69
End: 2019-03-10

## 2019-04-05 ENCOUNTER — OUTPATIENT (OUTPATIENT)
Dept: OUTPATIENT SERVICES | Facility: HOSPITAL | Age: 69
LOS: 1 days | End: 2019-04-05
Payer: COMMERCIAL

## 2019-04-05 VITALS
WEIGHT: 145.06 LBS | TEMPERATURE: 97 F | DIASTOLIC BLOOD PRESSURE: 60 MMHG | RESPIRATION RATE: 14 BRPM | SYSTOLIC BLOOD PRESSURE: 136 MMHG | OXYGEN SATURATION: 98 % | HEIGHT: 64 IN | HEART RATE: 69 BPM

## 2019-04-05 DIAGNOSIS — Z98.890 OTHER SPECIFIED POSTPROCEDURAL STATES: Chronic | ICD-10-CM

## 2019-04-05 DIAGNOSIS — D24.2 BENIGN NEOPLASM OF LEFT BREAST: Chronic | ICD-10-CM

## 2019-04-05 DIAGNOSIS — H26.9 UNSPECIFIED CATARACT: Chronic | ICD-10-CM

## 2019-04-05 DIAGNOSIS — I34.9 NONRHEUMATIC MITRAL VALVE DISORDER, UNSPECIFIED: ICD-10-CM

## 2019-04-05 LAB
ALBUMIN SERPL ELPH-MCNC: 4.6 G/DL — SIGNIFICANT CHANGE UP (ref 3.3–5)
ALP SERPL-CCNC: 80 U/L — SIGNIFICANT CHANGE UP (ref 40–120)
ALT FLD-CCNC: 24 U/L — SIGNIFICANT CHANGE UP (ref 10–45)
ANION GAP SERPL CALC-SCNC: 11 MMOL/L — SIGNIFICANT CHANGE UP (ref 5–17)
AST SERPL-CCNC: 25 U/L — SIGNIFICANT CHANGE UP (ref 10–40)
BILIRUB SERPL-MCNC: 0.5 MG/DL — SIGNIFICANT CHANGE UP (ref 0.2–1.2)
BUN SERPL-MCNC: 10 MG/DL — SIGNIFICANT CHANGE UP (ref 7–23)
CALCIUM SERPL-MCNC: 10.4 MG/DL — SIGNIFICANT CHANGE UP (ref 8.4–10.5)
CHLORIDE SERPL-SCNC: 107 MMOL/L — SIGNIFICANT CHANGE UP (ref 96–108)
CO2 SERPL-SCNC: 25 MMOL/L — SIGNIFICANT CHANGE UP (ref 22–31)
CREAT SERPL-MCNC: 0.61 MG/DL — SIGNIFICANT CHANGE UP (ref 0.5–1.3)
GLUCOSE SERPL-MCNC: 99 MG/DL — SIGNIFICANT CHANGE UP (ref 70–99)
HCT VFR BLD CALC: 43.4 % — SIGNIFICANT CHANGE UP (ref 34.5–45)
HGB BLD-MCNC: 14.6 G/DL — SIGNIFICANT CHANGE UP (ref 11.5–15.5)
MCHC RBC-ENTMCNC: 29.9 PG — SIGNIFICANT CHANGE UP (ref 27–34)
MCHC RBC-ENTMCNC: 33.8 GM/DL — SIGNIFICANT CHANGE UP (ref 32–36)
MCV RBC AUTO: 88.7 FL — SIGNIFICANT CHANGE UP (ref 80–100)
PLATELET # BLD AUTO: 236 K/UL — SIGNIFICANT CHANGE UP (ref 150–400)
POTASSIUM SERPL-MCNC: 4.2 MMOL/L — SIGNIFICANT CHANGE UP (ref 3.5–5.3)
POTASSIUM SERPL-SCNC: 4.2 MMOL/L — SIGNIFICANT CHANGE UP (ref 3.5–5.3)
PROT SERPL-MCNC: 7.7 G/DL — SIGNIFICANT CHANGE UP (ref 6–8.3)
RBC # BLD: 4.89 M/UL — SIGNIFICANT CHANGE UP (ref 3.8–5.2)
RBC # FLD: 12.3 % — SIGNIFICANT CHANGE UP (ref 10.3–14.5)
SODIUM SERPL-SCNC: 143 MMOL/L — SIGNIFICANT CHANGE UP (ref 135–145)
WBC # BLD: 4.8 K/UL — SIGNIFICANT CHANGE UP (ref 3.8–10.5)
WBC # FLD AUTO: 4.8 K/UL — SIGNIFICANT CHANGE UP (ref 3.8–10.5)

## 2019-04-05 PROCEDURE — 80053 COMPREHEN METABOLIC PANEL: CPT

## 2019-04-05 PROCEDURE — 93460 R&L HRT ART/VENTRICLE ANGIO: CPT | Mod: 26,GC

## 2019-04-05 PROCEDURE — 85027 COMPLETE CBC AUTOMATED: CPT

## 2019-04-05 PROCEDURE — 93460 R&L HRT ART/VENTRICLE ANGIO: CPT

## 2019-04-05 PROCEDURE — 99152 MOD SED SAME PHYS/QHP 5/>YRS: CPT | Mod: GC

## 2019-04-05 PROCEDURE — C1769: CPT

## 2019-04-05 PROCEDURE — C1887: CPT

## 2019-04-05 PROCEDURE — C1894: CPT

## 2019-04-05 PROCEDURE — 93005 ELECTROCARDIOGRAM TRACING: CPT

## 2019-04-05 PROCEDURE — 93010 ELECTROCARDIOGRAM REPORT: CPT

## 2019-04-05 PROCEDURE — 99152 MOD SED SAME PHYS/QHP 5/>YRS: CPT

## 2019-04-05 NOTE — H&P CARDIOLOGY - HISTORY OF PRESENT ILLNESS
68 year old  female with pmhx of HTN, asthma, moderate to severe MR, PVC's and HLD presents for pre-op cath for upcoming mitral valve repair/replacement. The patient states she does experience shortness of breath upon walking up 2 flights of stairs. The shortness of breath is alleviated by resting. As per Dr. Bergman's note, her MR has progressively worsened with a severely dilated LA and EF of 60%. Her valve surgery with Dr. Bergman is scheduled for 4/17/19.  She currently denies chest pain, syncope, or shortness of breath.

## 2019-04-05 NOTE — H&P CARDIOLOGY - RESPIRATORY AND THORAX
Patient attended Phase 2 Cardiac Rehab Exercise Session. Further documentation will be completed in Cardiac Science/[employer] System and will be scanned into the medical record upon discharge.     negative

## 2019-04-05 NOTE — H&P CARDIOLOGY - FAMILY HISTORY
Mother  Still living? Yes, Estimated age: Age Unknown  Family history of coronary artery disease, Age at diagnosis: Age Unknown     Mother  Still living? Yes, Estimated age: Age Unknown  Family history of hypertension, Age at diagnosis: Age Unknown

## 2019-04-10 ENCOUNTER — OUTPATIENT (OUTPATIENT)
Dept: OUTPATIENT SERVICES | Facility: HOSPITAL | Age: 69
LOS: 1 days | End: 2019-04-10
Payer: COMMERCIAL

## 2019-04-10 VITALS
HEART RATE: 72 BPM | DIASTOLIC BLOOD PRESSURE: 80 MMHG | HEIGHT: 64 IN | OXYGEN SATURATION: 97 % | SYSTOLIC BLOOD PRESSURE: 126 MMHG | RESPIRATION RATE: 16 BRPM | WEIGHT: 145.95 LBS | TEMPERATURE: 98 F

## 2019-04-10 DIAGNOSIS — D24.2 BENIGN NEOPLASM OF LEFT BREAST: Chronic | ICD-10-CM

## 2019-04-10 DIAGNOSIS — Z98.890 OTHER SPECIFIED POSTPROCEDURAL STATES: Chronic | ICD-10-CM

## 2019-04-10 DIAGNOSIS — H26.9 UNSPECIFIED CATARACT: Chronic | ICD-10-CM

## 2019-04-10 DIAGNOSIS — Z29.9 ENCOUNTER FOR PROPHYLACTIC MEASURES, UNSPECIFIED: ICD-10-CM

## 2019-04-10 DIAGNOSIS — J45.909 UNSPECIFIED ASTHMA, UNCOMPLICATED: ICD-10-CM

## 2019-04-10 DIAGNOSIS — Z01.818 ENCOUNTER FOR OTHER PREPROCEDURAL EXAMINATION: ICD-10-CM

## 2019-04-10 DIAGNOSIS — I34.0 NONRHEUMATIC MITRAL (VALVE) INSUFFICIENCY: ICD-10-CM

## 2019-04-10 LAB
BLD GP AB SCN SERPL QL: NEGATIVE — SIGNIFICANT CHANGE UP
BUN SERPL-MCNC: 12 MG/DL — SIGNIFICANT CHANGE UP (ref 7–23)
CK SERPL-CCNC: 98 U/L — SIGNIFICANT CHANGE UP (ref 25–170)
HBA1C BLD-MCNC: 5.7 % — HIGH (ref 4–5.6)
RH IG SCN BLD-IMP: POSITIVE — SIGNIFICANT CHANGE UP

## 2019-04-10 PROCEDURE — 83036 HEMOGLOBIN GLYCOSYLATED A1C: CPT

## 2019-04-10 PROCEDURE — 71046 X-RAY EXAM CHEST 2 VIEWS: CPT

## 2019-04-10 PROCEDURE — 86900 BLOOD TYPING SEROLOGIC ABO: CPT

## 2019-04-10 PROCEDURE — 71046 X-RAY EXAM CHEST 2 VIEWS: CPT | Mod: 26

## 2019-04-10 PROCEDURE — 84520 ASSAY OF UREA NITROGEN: CPT

## 2019-04-10 PROCEDURE — 86901 BLOOD TYPING SEROLOGIC RH(D): CPT

## 2019-04-10 PROCEDURE — G0463: CPT

## 2019-04-10 PROCEDURE — 87640 STAPH A DNA AMP PROBE: CPT

## 2019-04-10 PROCEDURE — 86850 RBC ANTIBODY SCREEN: CPT

## 2019-04-10 PROCEDURE — 82550 ASSAY OF CK (CPK): CPT

## 2019-04-10 PROCEDURE — 87641 MR-STAPH DNA AMP PROBE: CPT

## 2019-04-10 RX ORDER — MOMETASONE FUROATE 220 UG/1
1 INHALANT RESPIRATORY (INHALATION)
Qty: 0 | Refills: 0 | COMMUNITY

## 2019-04-10 RX ORDER — EZETIMIBE AND SIMVASTATIN 10; 80 MG/1; MG/1
1 TABLET, FILM COATED ORAL
Qty: 0 | Refills: 0 | COMMUNITY

## 2019-04-10 RX ORDER — VENLAFAXINE HCL 75 MG
1 CAPSULE, EXT RELEASE 24 HR ORAL
Qty: 0 | Refills: 0 | COMMUNITY

## 2019-04-10 RX ORDER — VALSARTAN 80 MG/1
1 TABLET ORAL
Qty: 0 | Refills: 0 | COMMUNITY

## 2019-04-10 RX ORDER — FLUTICASONE PROPIONATE 220 MCG
1 AEROSOL WITH ADAPTER (GRAM) INHALATION
Qty: 0 | Refills: 0 | COMMUNITY

## 2019-04-10 RX ORDER — SODIUM CHLORIDE 9 MG/ML
3 INJECTION INTRAMUSCULAR; INTRAVENOUS; SUBCUTANEOUS EVERY 8 HOURS
Qty: 0 | Refills: 0 | Status: DISCONTINUED | OUTPATIENT
Start: 2019-04-16 | End: 2019-04-19

## 2019-04-10 NOTE — H&P PST ADULT - NSICDXPROBLEM_GEN_ALL_CORE_FT
PROBLEM DIAGNOSES  Problem: Need for prophylactic measure  Assessment and Plan: The Caprini score indicates this patient is at risk for a VTE event (score 3-5).  Most surgical patients in this group would benefit from pharmacologic prophylaxis.  The surgical team will determine the balance between VTE risk and bleeding risk    Problem: Allergy-induced asthma  Assessment and Plan: continue with bronchodilator      R/O PROBLEM DIAGNOSES  Problem: Nonrheumatic mitral (valve) insufficiency  Assessment and Plan: Mitral valve repair, possible replacement  Maze procedure  Left atrial appendage closure  Labs- BUN, Creat, HbA1C,MRSA/MSSA, CXR  Pre op instructions discussed

## 2019-04-10 NOTE — H&P PST ADULT - NSICDXFAMILYHX_GEN_ALL_CORE_FT
FAMILY HISTORY:  Mother  Still living? Yes, Estimated age: Age Unknown  Family history of coronary artery disease, Age at diagnosis: Age Unknown  Family history of hypertension, Age at diagnosis: Age Unknown

## 2019-04-10 NOTE — H&P PST ADULT - NSICDXPASTMEDICALHX_GEN_ALL_CORE_FT
PAST MEDICAL HISTORY:  Hyperlipidemia     MVP (mitral valve prolapse)     Osteopenia     Trigger finger PAST MEDICAL HISTORY:  Allergy-induced asthma, mild intermittent, uncomplicated Denies any exacerbation or ED admission    Hyperlipidemia     MVP (mitral valve prolapse)     Osteopenia     Trigger finger

## 2019-04-10 NOTE — H&P PST ADULT - NEGATIVE CARDIOVASCULAR SYMPTOMS
See HPI/no chest pain/no palpitations/no dyspnea on exertion/no orthopnea/no paroxysmal nocturnal dyspnea

## 2019-04-10 NOTE — H&P PST ADULT - ASSESSMENT
CAPRINI SCORE [CLOT updated 18]    AGE RELATED RISK FACTORS                                                       MOBILITY RELATED FACTORS  [ ] Age 41-60 years                                            (1 Point)                    [ ] Bed rest                                                        (1 Point)  [X ] Age: 61-74 years                                           (2 Points)                  [ ] Plaster cast                                                   (2 Points)  [ ] Age= 75 years                                              (3 Points)                    [ ] Bed bound for more than 72 hours                 (2 Points)    DISEASE RELATED RISK FACTORS                                               GENDER SPECIFIC FACTORS  [ ] Edema in the lower extremities                       (1 Point)              [ ] Pregnancy                                                     (1 Point)  [ ] Varicose veins                                               (1 Point)                     [ ] Post-partum < 6 weeks                                   (1 Point)             [x ] BMI > 25 Kg/m2                                            (1 Point)                     [ ] Hormonal therapy  or oral contraception          (1 Point)                 [ ] Sepsis (in the previous month)                        (1 Point)               [ ] History of pregnancy complications                 (1 point)  [ ] Pneumonia or serious lung disease                                               [ ] Unexplained or recurrent                     (1 Point)           (in the previous month)                               (1 Point)  [ ] Abnormal pulmonary function test                     (1 Point)                 SURGERY RELATED RISK FACTORS  [ ] Acute myocardial infarction                              (1 Point)               [ ]  Section                                             (1 Point)  [ ] Congestive heart failure (in the previous month)  (1 Point)      [ ] Minor surgery                                                  (1 Point)   [ ] Inflammatory bowel disease                             (1 Point)               [ ] Arthroscopic surgery                                        (2 Points)  [ ] Central venous access                                      (2 Points)                [x ] General surgery lasting more than 45 minutes (2 points)  [ ] Present or previous malignancy                     (2 Points)                [ ] Elective arthroplasty                                         (5 points)    [ ] Stroke (in the previous month)                          (5 Points)                                                                                                                                                           HEMATOLOGY RELATED FACTORS                                                 TRAUMA RELATED RISK FACTORS  [ ] Prior episodes of VTE                                     (3 Points)                [ ] Fracture of the hip, pelvis, or leg                       (5 Points)  [ ] Positive family history for VTE                         (3 Points)             [ ] Acute spinal cord injury (in the previous month)  (5 Points)  [ ] Prothrombin 28044 A                                     (3 Points)               [ ] Paralysis  (less than 1 month)                             (5 Points)  [ ] Factor V Leiden                                             (3 Points)                  [ ] Multiple Trauma within 1 month                        (5 Points)  [ ] Lupus anticoagulants                                     (3 Points)                                                           [ ] Anticardiolipin antibodies                               (3 Points)                                                       [ ] High homocysteine in the blood                      (3 Points)                                             [ ] Other congenital or acquired thrombophilia      (3 Points)                                                [ ] Heparin induced thrombocytopenia                  (3 Points)                                     Total Score [    4     ]

## 2019-04-10 NOTE — H&P PST ADULT - HISTORY OF PRESENT ILLNESS
68 yo F with h/o HLD, MVP/MR, allergy induced Asthma (controlled) had f/u cardiology evaluation. ECHO revealed moderate to severe MR . Pt had cardiac cath /surgical consult- scheduled for mitral valve repair, possible replacement, radio frequency, Maze procedure, left atrial appendage closure on 04/17/2019. Pt denies any chest pain/SOB 70 yo F with h/o HLD, MVP/MR, allergy induced Asthma (controlled) had f/u cardiology evaluation. ECHO revealed moderate to severe MR . Pt had cardiac cath /surgical consult- scheduled for mitral valve repair, possible replacement, radio frequency, Maze procedure, left atrial appendage closure on 04/17/2019. Pt denies any chest pain/SOB/syncope.

## 2019-04-10 NOTE — H&P PST ADULT - NSANTHOSAYNRD_GEN_A_CORE
No. PARAMJIT screening performed.  STOP BANG Legend: 0-2 = LOW Risk; 3-4 = INTERMEDIATE Risk; 5-8 = HIGH Risk

## 2019-04-10 NOTE — H&P PST ADULT - NSICDXPASTSURGICALHX_GEN_ALL_CORE_FT
PAST SURGICAL HISTORY:  Breast fibroadenoma in female, left 1988    Cataract 10 yrs ago    H/O laminectomy 2017

## 2019-04-11 ENCOUNTER — APPOINTMENT (OUTPATIENT)
Dept: CARDIOLOGY | Facility: CLINIC | Age: 69
End: 2019-04-11

## 2019-04-11 PROBLEM — J45.20 MILD INTERMITTENT ASTHMA, UNCOMPLICATED: Chronic | Status: ACTIVE | Noted: 2019-04-10

## 2019-04-11 LAB
MRSA PCR RESULT.: SIGNIFICANT CHANGE UP
S AUREUS DNA NOSE QL NAA+PROBE: SIGNIFICANT CHANGE UP

## 2019-04-16 ENCOUNTER — RESULT REVIEW (OUTPATIENT)
Age: 69
End: 2019-04-16

## 2019-04-16 ENCOUNTER — INPATIENT (INPATIENT)
Facility: HOSPITAL | Age: 69
LOS: 12 days | Discharge: HOME | DRG: 219 | End: 2019-04-29
Attending: THORACIC SURGERY (CARDIOTHORACIC VASCULAR SURGERY) | Admitting: THORACIC SURGERY (CARDIOTHORACIC VASCULAR SURGERY)
Payer: COMMERCIAL

## 2019-04-16 ENCOUNTER — APPOINTMENT (OUTPATIENT)
Dept: CARDIOTHORACIC SURGERY | Facility: HOSPITAL | Age: 69
End: 2019-04-16

## 2019-04-16 VITALS
TEMPERATURE: 98 F | RESPIRATION RATE: 18 BRPM | SYSTOLIC BLOOD PRESSURE: 126 MMHG | HEART RATE: 76 BPM | HEIGHT: 64 IN | DIASTOLIC BLOOD PRESSURE: 78 MMHG | OXYGEN SATURATION: 97 % | WEIGHT: 147.05 LBS

## 2019-04-16 DIAGNOSIS — Z98.890 OTHER SPECIFIED POSTPROCEDURAL STATES: Chronic | ICD-10-CM

## 2019-04-16 DIAGNOSIS — H26.9 UNSPECIFIED CATARACT: Chronic | ICD-10-CM

## 2019-04-16 DIAGNOSIS — D24.2 BENIGN NEOPLASM OF LEFT BREAST: Chronic | ICD-10-CM

## 2019-04-16 DIAGNOSIS — I34.0 NONRHEUMATIC MITRAL (VALVE) INSUFFICIENCY: ICD-10-CM

## 2019-04-16 LAB
ALBUMIN SERPL ELPH-MCNC: 2.5 G/DL — LOW (ref 3.3–5)
ALP SERPL-CCNC: 40 U/L — SIGNIFICANT CHANGE UP (ref 40–120)
ALT FLD-CCNC: 24 U/L — SIGNIFICANT CHANGE UP (ref 10–45)
ANION GAP SERPL CALC-SCNC: 19 MMOL/L — HIGH (ref 5–17)
APTT BLD: 31.1 SEC — SIGNIFICANT CHANGE UP (ref 27.5–36.3)
AST SERPL-CCNC: 85 U/L — HIGH (ref 10–40)
BASE EXCESS BLDV CALC-SCNC: -1.3 MMOL/L — SIGNIFICANT CHANGE UP (ref -2–2)
BASE EXCESS BLDV CALC-SCNC: -1.4 MMOL/L — SIGNIFICANT CHANGE UP (ref -2–2)
BASE EXCESS BLDV CALC-SCNC: -3.1 MMOL/L — LOW (ref -2–2)
BASE EXCESS BLDV CALC-SCNC: -3.8 MMOL/L — LOW (ref -2–2)
BASOPHILS # BLD AUTO: 0 K/UL — SIGNIFICANT CHANGE UP (ref 0–0.2)
BASOPHILS NFR BLD AUTO: 0 % — SIGNIFICANT CHANGE UP (ref 0–2)
BILIRUB SERPL-MCNC: 1.1 MG/DL — SIGNIFICANT CHANGE UP (ref 0.2–1.2)
BLOOD GAS VENOUS - CREATININE: SIGNIFICANT CHANGE UP MG/DL (ref 0.5–1.3)
BLOOD GAS VENOUS - CREATININE: SIGNIFICANT CHANGE UP MG/DL (ref 0.5–1.3)
BUN SERPL-MCNC: 10 MG/DL — SIGNIFICANT CHANGE UP (ref 7–23)
CA-I SERPL-SCNC: 0.91 MMOL/L — LOW (ref 1.12–1.3)
CA-I SERPL-SCNC: 0.92 MMOL/L — LOW (ref 1.12–1.3)
CA-I SERPL-SCNC: 0.92 MMOL/L — LOW (ref 1.12–1.3)
CALCIUM SERPL-MCNC: 8.3 MG/DL — LOW (ref 8.4–10.5)
CHLORIDE BLDV-SCNC: SIGNIFICANT CHANGE UP MMOL/L (ref 96–108)
CHLORIDE SERPL-SCNC: 105 MMOL/L — SIGNIFICANT CHANGE UP (ref 96–108)
CK MB BLD-MCNC: 16.8 % — HIGH (ref 0–3.5)
CK MB CFR SERPL CALC: 101.8 NG/ML — HIGH (ref 0–3.8)
CK SERPL-CCNC: 605 U/L — HIGH (ref 25–170)
CO2 BLDV-SCNC: 26 MMOL/L — SIGNIFICANT CHANGE UP (ref 22–30)
CO2 SERPL-SCNC: 20 MMOL/L — LOW (ref 22–31)
CREAT SERPL-MCNC: 0.44 MG/DL — LOW (ref 0.5–1.3)
EOSINOPHIL # BLD AUTO: 0.2 K/UL — SIGNIFICANT CHANGE UP (ref 0–0.5)
EOSINOPHIL NFR BLD AUTO: 2 % — SIGNIFICANT CHANGE UP (ref 0–6)
FIBRINOGEN PPP-MCNC: 206 MG/DL — LOW (ref 350–510)
GAS PNL BLDA: SIGNIFICANT CHANGE UP
GAS PNL BLDV: 136 MMOL/L — SIGNIFICANT CHANGE UP (ref 136–145)
GAS PNL BLDV: 137 MMOL/L — SIGNIFICANT CHANGE UP (ref 136–145)
GAS PNL BLDV: 137 MMOL/L — SIGNIFICANT CHANGE UP (ref 136–145)
GAS PNL BLDV: SIGNIFICANT CHANGE UP
GLUCOSE BLDV-MCNC: 143 MG/DL — HIGH (ref 70–99)
GLUCOSE BLDV-MCNC: 147 MG/DL — HIGH (ref 70–99)
GLUCOSE BLDV-MCNC: 165 MG/DL — HIGH (ref 70–99)
GLUCOSE SERPL-MCNC: 119 MG/DL — HIGH (ref 70–99)
HCO3 BLDV-SCNC: 23 MMOL/L — SIGNIFICANT CHANGE UP (ref 21–29)
HCO3 BLDV-SCNC: 24 MMOL/L — SIGNIFICANT CHANGE UP (ref 21–29)
HCO3 BLDV-SCNC: 25 MMOL/L — SIGNIFICANT CHANGE UP (ref 21–29)
HCO3 BLDV-SCNC: 26 MMOL/L — SIGNIFICANT CHANGE UP (ref 21–29)
HCT VFR BLD CALC: 31 % — LOW (ref 34.5–45)
HCT VFR BLDA CALC: 26 % — LOW (ref 39–50)
HCT VFR BLDA CALC: 27 % — LOW (ref 39–50)
HCT VFR BLDA CALC: 27 % — LOW (ref 39–50)
HGB BLD CALC-MCNC: 8.5 G/DL — LOW (ref 11.5–15.5)
HGB BLD CALC-MCNC: 8.6 G/DL — LOW (ref 11.5–15.5)
HGB BLD CALC-MCNC: 8.7 G/DL — LOW (ref 11.5–15.5)
HGB BLD-MCNC: 10.8 G/DL — LOW (ref 11.5–15.5)
HOROWITZ INDEX BLDV+IHG-RTO: 0 — SIGNIFICANT CHANGE UP
HOROWITZ INDEX BLDV+IHG-RTO: 40 — SIGNIFICANT CHANGE UP
INR BLD: 1.59 RATIO — HIGH (ref 0.88–1.16)
LACTATE BLDV-MCNC: 1.2 MMOL/L — SIGNIFICANT CHANGE UP (ref 0.7–2)
LACTATE BLDV-MCNC: 1.6 MMOL/L — SIGNIFICANT CHANGE UP (ref 0.7–2)
LACTATE BLDV-MCNC: 1.9 MMOL/L — SIGNIFICANT CHANGE UP (ref 0.7–2)
LYMPHOCYTES # BLD AUTO: 10 % — LOW (ref 13–44)
LYMPHOCYTES # BLD AUTO: 2.7 K/UL — SIGNIFICANT CHANGE UP (ref 1–3.3)
MAGNESIUM SERPL-MCNC: 3.5 MG/DL — HIGH (ref 1.6–2.6)
MCHC RBC-ENTMCNC: 31 PG — SIGNIFICANT CHANGE UP (ref 27–34)
MCHC RBC-ENTMCNC: 35 GM/DL — SIGNIFICANT CHANGE UP (ref 32–36)
MCV RBC AUTO: 88.7 FL — SIGNIFICANT CHANGE UP (ref 80–100)
MONOCYTES # BLD AUTO: 1 K/UL — HIGH (ref 0–0.9)
MONOCYTES NFR BLD AUTO: 5 % — SIGNIFICANT CHANGE UP (ref 2–14)
NEUTROPHILS # BLD AUTO: 14.3 K/UL — HIGH (ref 1.8–7.4)
NEUTROPHILS NFR BLD AUTO: 79 % — HIGH (ref 43–77)
PCO2 BLDV: 49 MMHG — SIGNIFICANT CHANGE UP (ref 35–50)
PCO2 BLDV: 56 MMHG — HIGH (ref 35–50)
PCO2 BLDV: 61 MMHG — HIGH (ref 35–50)
PCO2 BLDV: 67 MMHG — HIGH (ref 35–50)
PH BLDV: 7.2 — LOW (ref 7.35–7.45)
PH BLDV: 7.24 — LOW (ref 7.35–7.45)
PH BLDV: 7.25 — LOW (ref 7.35–7.45)
PH BLDV: 7.32 — LOW (ref 7.35–7.45)
PHOSPHATE SERPL-MCNC: 1.4 MG/DL — LOW (ref 2.5–4.5)
PLATELET # BLD AUTO: 104 K/UL — LOW (ref 150–400)
PO2 BLDV: 40 MMHG — SIGNIFICANT CHANGE UP (ref 25–45)
PO2 BLDV: 71 MMHG — HIGH (ref 25–45)
PO2 BLDV: 74 MMHG — HIGH (ref 25–45)
PO2 BLDV: 75 MMHG — HIGH (ref 25–45)
POTASSIUM BLDV-SCNC: 4.9 MMOL/L — SIGNIFICANT CHANGE UP (ref 3.5–5)
POTASSIUM BLDV-SCNC: 5.4 MMOL/L — HIGH (ref 3.5–5)
POTASSIUM BLDV-SCNC: 6.2 MMOL/L — CRITICAL HIGH (ref 3.5–5)
POTASSIUM SERPL-MCNC: 3.8 MMOL/L — SIGNIFICANT CHANGE UP (ref 3.5–5.3)
POTASSIUM SERPL-SCNC: 3.8 MMOL/L — SIGNIFICANT CHANGE UP (ref 3.5–5.3)
PROT SERPL-MCNC: 4.3 G/DL — LOW (ref 6–8.3)
PROTHROM AB SERPL-ACNC: 18.5 SEC — HIGH (ref 10–12.9)
RBC # BLD: 3.5 M/UL — LOW (ref 3.8–5.2)
RBC # FLD: 12 % — SIGNIFICANT CHANGE UP (ref 10.3–14.5)
SAO2 % BLDV: 67 % — SIGNIFICANT CHANGE UP (ref 67–88)
SAO2 % BLDV: 90 % — HIGH (ref 67–88)
SAO2 % BLDV: 90 % — HIGH (ref 67–88)
SAO2 % BLDV: 91 % — HIGH (ref 67–88)
SODIUM SERPL-SCNC: 144 MMOL/L — SIGNIFICANT CHANGE UP (ref 135–145)
TROPONIN T, HIGH SENSITIVITY RESULT: 2433 NG/L — HIGH (ref 0–51)
WBC # BLD: 18.4 K/UL — HIGH (ref 3.8–10.5)
WBC # FLD AUTO: 18.4 K/UL — HIGH (ref 3.8–10.5)

## 2019-04-16 PROCEDURE — 88305 TISSUE EXAM BY PATHOLOGIST: CPT | Mod: 26

## 2019-04-16 PROCEDURE — 33259 ABLATE ATRIA W/BYPASS ADD-ON: CPT

## 2019-04-16 PROCEDURE — 33430 REPLACEMENT OF MITRAL VALVE: CPT

## 2019-04-16 PROCEDURE — 71045 X-RAY EXAM CHEST 1 VIEW: CPT | Mod: 26

## 2019-04-16 PROCEDURE — 99291 CRITICAL CARE FIRST HOUR: CPT

## 2019-04-16 RX ORDER — SODIUM CHLORIDE 9 MG/ML
750 INJECTION, SOLUTION INTRAVENOUS ONCE
Qty: 0 | Refills: 0 | Status: COMPLETED | OUTPATIENT
Start: 2019-04-16 | End: 2019-04-16

## 2019-04-16 RX ORDER — SODIUM CHLORIDE 9 MG/ML
1000 INJECTION INTRAMUSCULAR; INTRAVENOUS; SUBCUTANEOUS
Qty: 0 | Refills: 0 | Status: DISCONTINUED | OUTPATIENT
Start: 2019-04-16 | End: 2019-04-20

## 2019-04-16 RX ORDER — METOCLOPRAMIDE HCL 10 MG
10 TABLET ORAL EVERY 8 HOURS
Qty: 0 | Refills: 0 | Status: DISCONTINUED | OUTPATIENT
Start: 2019-04-16 | End: 2019-04-17

## 2019-04-16 RX ORDER — OXYCODONE AND ACETAMINOPHEN 5; 325 MG/1; MG/1
1 TABLET ORAL EVERY 4 HOURS
Qty: 0 | Refills: 0 | Status: DISCONTINUED | OUTPATIENT
Start: 2019-04-16 | End: 2019-04-17

## 2019-04-16 RX ORDER — OXYCODONE AND ACETAMINOPHEN 5; 325 MG/1; MG/1
2 TABLET ORAL EVERY 6 HOURS
Qty: 0 | Refills: 0 | Status: DISCONTINUED | OUTPATIENT
Start: 2019-04-16 | End: 2019-04-16

## 2019-04-16 RX ORDER — FENTANYL CITRATE 50 UG/ML
50 INJECTION INTRAVENOUS ONCE
Qty: 0 | Refills: 0 | Status: DISCONTINUED | OUTPATIENT
Start: 2019-04-16 | End: 2019-04-16

## 2019-04-16 RX ORDER — CEFUROXIME AXETIL 250 MG
1500 TABLET ORAL ONCE
Qty: 0 | Refills: 0 | Status: DISCONTINUED | OUTPATIENT
Start: 2019-04-16 | End: 2019-04-16

## 2019-04-16 RX ORDER — LIDOCAINE HCL 20 MG/ML
0.2 VIAL (ML) INJECTION ONCE
Qty: 0 | Refills: 0 | Status: COMPLETED | OUTPATIENT
Start: 2019-04-16 | End: 2019-04-16

## 2019-04-16 RX ORDER — TIOTROPIUM BROMIDE 18 UG/1
1 CAPSULE ORAL; RESPIRATORY (INHALATION) DAILY
Qty: 0 | Refills: 0 | Status: DISCONTINUED | OUTPATIENT
Start: 2019-04-16 | End: 2019-04-29

## 2019-04-16 RX ORDER — HYDROMORPHONE HYDROCHLORIDE 2 MG/ML
0.5 INJECTION INTRAMUSCULAR; INTRAVENOUS; SUBCUTANEOUS ONCE
Qty: 0 | Refills: 0 | Status: DISCONTINUED | OUTPATIENT
Start: 2019-04-16 | End: 2019-04-16

## 2019-04-16 RX ORDER — PANTOPRAZOLE SODIUM 20 MG/1
40 TABLET, DELAYED RELEASE ORAL DAILY
Qty: 0 | Refills: 0 | Status: DISCONTINUED | OUTPATIENT
Start: 2019-04-16 | End: 2019-04-18

## 2019-04-16 RX ORDER — DEXMEDETOMIDINE HYDROCHLORIDE IN 0.9% SODIUM CHLORIDE 4 UG/ML
0.5 INJECTION INTRAVENOUS
Qty: 200 | Refills: 0 | Status: DISCONTINUED | OUTPATIENT
Start: 2019-04-16 | End: 2019-04-17

## 2019-04-16 RX ORDER — CEFUROXIME AXETIL 250 MG
1500 TABLET ORAL EVERY 8 HOURS
Qty: 0 | Refills: 0 | Status: COMPLETED | OUTPATIENT
Start: 2019-04-16 | End: 2019-04-18

## 2019-04-16 RX ORDER — METOCLOPRAMIDE HCL 10 MG
10 TABLET ORAL EVERY 8 HOURS
Qty: 0 | Refills: 0 | Status: DISCONTINUED | OUTPATIENT
Start: 2019-04-16 | End: 2019-04-16

## 2019-04-16 RX ORDER — ONDANSETRON 8 MG/1
4 TABLET, FILM COATED ORAL ONCE
Qty: 0 | Refills: 0 | Status: COMPLETED | OUTPATIENT
Start: 2019-04-16 | End: 2019-04-16

## 2019-04-16 RX ORDER — IPRATROPIUM/ALBUTEROL SULFATE 18-103MCG
3 AEROSOL WITH ADAPTER (GRAM) INHALATION EVERY 6 HOURS
Qty: 0 | Refills: 0 | Status: DISCONTINUED | OUTPATIENT
Start: 2019-04-16 | End: 2019-04-24

## 2019-04-16 RX ORDER — VASOPRESSIN 20 [USP'U]/ML
0.07 INJECTION INTRAVENOUS
Qty: 50 | Refills: 0 | Status: DISCONTINUED | OUTPATIENT
Start: 2019-04-16 | End: 2019-04-20

## 2019-04-16 RX ORDER — MEPERIDINE HYDROCHLORIDE 50 MG/ML
25 INJECTION INTRAMUSCULAR; INTRAVENOUS; SUBCUTANEOUS ONCE
Qty: 0 | Refills: 0 | Status: DISCONTINUED | OUTPATIENT
Start: 2019-04-16 | End: 2019-04-17

## 2019-04-16 RX ORDER — DOBUTAMINE HCL 250MG/20ML
2 VIAL (ML) INTRAVENOUS
Qty: 500 | Refills: 0 | Status: DISCONTINUED | OUTPATIENT
Start: 2019-04-16 | End: 2019-04-16

## 2019-04-16 RX ORDER — POTASSIUM CHLORIDE 20 MEQ
10 PACKET (EA) ORAL
Qty: 0 | Refills: 0 | Status: COMPLETED | OUTPATIENT
Start: 2019-04-16 | End: 2019-04-16

## 2019-04-16 RX ORDER — ALBUTEROL 90 UG/1
1 AEROSOL, METERED ORAL EVERY 4 HOURS
Qty: 0 | Refills: 0 | Status: DISCONTINUED | OUTPATIENT
Start: 2019-04-16 | End: 2019-04-29

## 2019-04-16 RX ORDER — INSULIN HUMAN 100 [IU]/ML
2 INJECTION, SOLUTION SUBCUTANEOUS
Qty: 100 | Refills: 0 | Status: DISCONTINUED | OUTPATIENT
Start: 2019-04-16 | End: 2019-04-17

## 2019-04-16 RX ORDER — POTASSIUM CHLORIDE 20 MEQ
10 PACKET (EA) ORAL
Qty: 0 | Refills: 0 | Status: DISCONTINUED | OUTPATIENT
Start: 2019-04-16 | End: 2019-04-19

## 2019-04-16 RX ORDER — DEXTROSE 50 % IN WATER 50 %
50 SYRINGE (ML) INTRAVENOUS
Qty: 0 | Refills: 0 | Status: DISCONTINUED | OUTPATIENT
Start: 2019-04-16 | End: 2019-04-22

## 2019-04-16 RX ORDER — MAGNESIUM SULFATE 500 MG/ML
2 VIAL (ML) INJECTION ONCE
Qty: 0 | Refills: 0 | Status: COMPLETED | OUTPATIENT
Start: 2019-04-16 | End: 2019-04-16

## 2019-04-16 RX ORDER — DOCUSATE SODIUM 100 MG
100 CAPSULE ORAL THREE TIMES A DAY
Qty: 0 | Refills: 0 | Status: DISCONTINUED | OUTPATIENT
Start: 2019-04-16 | End: 2019-04-29

## 2019-04-16 RX ORDER — ALBUMIN HUMAN 25 %
250 VIAL (ML) INTRAVENOUS
Qty: 0 | Refills: 0 | Status: COMPLETED | OUTPATIENT
Start: 2019-04-16 | End: 2019-04-16

## 2019-04-16 RX ORDER — DOBUTAMINE HCL 250MG/20ML
2.5 VIAL (ML) INTRAVENOUS
Qty: 500 | Refills: 0 | Status: DISCONTINUED | OUTPATIENT
Start: 2019-04-16 | End: 2019-04-21

## 2019-04-16 RX ORDER — NOREPINEPHRINE BITARTRATE/D5W 8 MG/250ML
0.1 PLASTIC BAG, INJECTION (ML) INTRAVENOUS
Qty: 8 | Refills: 0 | Status: DISCONTINUED | OUTPATIENT
Start: 2019-04-16 | End: 2019-04-19

## 2019-04-16 RX ORDER — ALBUMIN HUMAN 25 %
250 VIAL (ML) INTRAVENOUS ONCE
Qty: 0 | Refills: 0 | Status: COMPLETED | OUTPATIENT
Start: 2019-04-16 | End: 2019-04-16

## 2019-04-16 RX ORDER — SODIUM CHLORIDE 9 MG/ML
500 INJECTION, SOLUTION INTRAVENOUS ONCE
Qty: 0 | Refills: 0 | Status: COMPLETED | OUTPATIENT
Start: 2019-04-16 | End: 2019-04-16

## 2019-04-16 RX ORDER — DEXTROSE 50 % IN WATER 50 %
25 SYRINGE (ML) INTRAVENOUS
Qty: 0 | Refills: 0 | Status: DISCONTINUED | OUTPATIENT
Start: 2019-04-16 | End: 2019-04-22

## 2019-04-16 RX ORDER — POTASSIUM CHLORIDE 20 MEQ
10 PACKET (EA) ORAL
Qty: 0 | Refills: 0 | Status: COMPLETED | OUTPATIENT
Start: 2019-04-16 | End: 2019-04-17

## 2019-04-16 RX ORDER — CHLORHEXIDINE GLUCONATE 213 G/1000ML
5 SOLUTION TOPICAL EVERY 4 HOURS
Qty: 0 | Refills: 0 | Status: DISCONTINUED | OUTPATIENT
Start: 2019-04-16 | End: 2019-04-17

## 2019-04-16 RX ADMIN — Medication 115.38 MILLILITER(S): at 19:18

## 2019-04-16 RX ADMIN — Medication 125 MILLILITER(S): at 20:30

## 2019-04-16 RX ADMIN — INSULIN HUMAN 2 UNIT(S)/HR: 100 INJECTION, SOLUTION SUBCUTANEOUS at 22:55

## 2019-04-16 RX ADMIN — FENTANYL CITRATE 50 MICROGRAM(S): 50 INJECTION INTRAVENOUS at 23:58

## 2019-04-16 RX ADMIN — Medication 3 MILLILITER(S): at 23:18

## 2019-04-16 RX ADMIN — VASOPRESSIN 4 UNIT(S)/MIN: 20 INJECTION INTRAVENOUS at 15:40

## 2019-04-16 RX ADMIN — Medication 100 MILLIGRAM(S): at 17:40

## 2019-04-16 RX ADMIN — Medication 12.51 MICROGRAM(S)/KG/MIN: at 15:40

## 2019-04-16 RX ADMIN — Medication 50 GRAM(S): at 16:15

## 2019-04-16 RX ADMIN — SODIUM CHLORIDE 4500 MILLILITER(S): 9 INJECTION, SOLUTION INTRAVENOUS at 21:00

## 2019-04-16 RX ADMIN — Medication 50 MILLIEQUIVALENT(S): at 18:30

## 2019-04-16 RX ADMIN — Medication 100 MILLIEQUIVALENT(S): at 22:53

## 2019-04-16 RX ADMIN — Medication 62.5 MILLIMOLE(S): at 19:06

## 2019-04-16 RX ADMIN — Medication 1000 MILLILITER(S): at 17:33

## 2019-04-16 RX ADMIN — Medication 125 MILLILITER(S): at 20:45

## 2019-04-16 RX ADMIN — DEXMEDETOMIDINE HYDROCHLORIDE IN 0.9% SODIUM CHLORIDE 8.34 MICROGRAM(S)/KG/HR: 4 INJECTION INTRAVENOUS at 15:46

## 2019-04-16 RX ADMIN — Medication 10.01 MICROGRAM(S)/KG/MIN: at 22:04

## 2019-04-16 RX ADMIN — Medication 100 MILLIEQUIVALENT(S): at 22:30

## 2019-04-16 RX ADMIN — CHLORHEXIDINE GLUCONATE 5 MILLILITER(S): 213 SOLUTION TOPICAL at 19:06

## 2019-04-16 RX ADMIN — Medication 50 MILLIEQUIVALENT(S): at 16:00

## 2019-04-16 RX ADMIN — SODIUM CHLORIDE 3 MILLILITER(S): 9 INJECTION INTRAMUSCULAR; INTRAVENOUS; SUBCUTANEOUS at 22:58

## 2019-04-16 RX ADMIN — Medication 125 MILLILITER(S): at 17:37

## 2019-04-16 RX ADMIN — SODIUM CHLORIDE 1500 MILLILITER(S): 9 INJECTION, SOLUTION INTRAVENOUS at 16:00

## 2019-04-16 RX ADMIN — Medication 100 MILLIEQUIVALENT(S): at 19:00

## 2019-04-16 RX ADMIN — Medication 10 MILLIGRAM(S): at 22:53

## 2019-04-16 RX ADMIN — HYDROMORPHONE HYDROCHLORIDE 0.5 MILLIGRAM(S): 2 INJECTION INTRAMUSCULAR; INTRAVENOUS; SUBCUTANEOUS at 20:30

## 2019-04-16 RX ADMIN — SODIUM CHLORIDE 4500 MILLILITER(S): 9 INJECTION, SOLUTION INTRAVENOUS at 17:00

## 2019-04-16 RX ADMIN — PANTOPRAZOLE SODIUM 40 MILLIGRAM(S): 20 TABLET, DELAYED RELEASE ORAL at 22:53

## 2019-04-16 RX ADMIN — Medication 125 MILLILITER(S): at 19:18

## 2019-04-16 RX ADMIN — ONDANSETRON 4 MILLIGRAM(S): 8 TABLET, FILM COATED ORAL at 20:12

## 2019-04-16 RX ADMIN — HYDROMORPHONE HYDROCHLORIDE 0.5 MILLIGRAM(S): 2 INJECTION INTRAMUSCULAR; INTRAVENOUS; SUBCUTANEOUS at 20:15

## 2019-04-16 RX ADMIN — Medication 50 MILLIEQUIVALENT(S): at 15:57

## 2019-04-16 RX ADMIN — Medication 50 MILLIEQUIVALENT(S): at 18:00

## 2019-04-16 RX ADMIN — Medication 125 MILLILITER(S): at 17:38

## 2019-04-16 RX ADMIN — Medication 1000 MILLILITER(S): at 16:15

## 2019-04-16 NOTE — BRIEF OPERATIVE NOTE - NSICDXBRIEFPROCEDURE_GEN_ALL_CORE_FT
PROCEDURES:  Ligation or exclusion of left atrial appendage 16-Apr-2019 15:37:25  Bert Skaggs  Maze procedure with cardiopulmonary bypass 16-Apr-2019 15:37:00  Bert Skaggs  Prosthetic mitral valve replacement 16-Apr-2019 15:35:11 #31 Pericardial tissue valve Bert Skaggs

## 2019-04-16 NOTE — AIRWAY REMOVAL NOTE  ADULT & PEDS - ARTIFICAL AIRWAY REMOVAL COMMENTS
Written order for extubation verified. The patient was identified by full name and birth date compared to the identification band. Present during the procedure was KARIN Bone.

## 2019-04-16 NOTE — BRIEF OPERATIVE NOTE - NSICDXBRIEFPREOP_GEN_ALL_CORE_FT
PRE-OP DIAGNOSIS:  SVT (supraventricular tachycardia) 16-Apr-2019 15:35:49  Bert Skaggs  Severe mitral valve regurgitation 16-Apr-2019 15:35:37  Bert Skaggs

## 2019-04-16 NOTE — PROGRESS NOTE ADULT - SUBJECTIVE AND OBJECTIVE BOX
SHAW BURKS   MRN#: [id number]     The patient is a 69y Female who was seen, evaluated, & examined with the CTICU staff post-operatively with a multidisciplinary care plan formulated & implemented.  All available clinical, laboratory, radiographic, pharmacologic, and electrocardiographic data were reviewed & analyzed.      The patient was in the CTICU in critical condition secondary to:     persistent cardiopulmonary dysfunction  undifferentiated shock    For support and evaluation & prevention of further decompensation secondary to persistent cardiopulmonary dysfunction and cardiogenic shock-cardiovascular dysfunction, respiratory status required:     supplemental oxygen with full ventilatory support/mechanical ventilation  continuous pulse oximetry monitoring  following ABGs with A-line monitoring    Invasive hemodynamic monitoring with     an A-line was required for continuous MAP/BP monitoring     to ensure adequate cardiovascular support and to evaluate for & help prevent decompensation while receiving     intermittent volume expansion  IV Levophed infusion  IV Vasopressin infusion    secondary to     undifferentiated shock    The patient required critical care management and I provided 30 minutes of non-continuous care to the patient in addition to  discussing the patient and plan at length with the CTICU staff and helping coordinate care.

## 2019-04-17 LAB
ALBUMIN SERPL ELPH-MCNC: 4.1 G/DL — SIGNIFICANT CHANGE UP (ref 3.3–5)
ALP SERPL-CCNC: 27 U/L — LOW (ref 40–120)
ALT FLD-CCNC: 22 U/L — SIGNIFICANT CHANGE UP (ref 10–45)
ANION GAP SERPL CALC-SCNC: 14 MMOL/L — SIGNIFICANT CHANGE UP (ref 5–17)
APTT BLD: 30.9 SEC — SIGNIFICANT CHANGE UP (ref 27.5–36.3)
AST SERPL-CCNC: 94 U/L — HIGH (ref 10–40)
BASE EXCESS BLDV CALC-SCNC: -0.8 MMOL/L — SIGNIFICANT CHANGE UP (ref -2–2)
BILIRUB SERPL-MCNC: 1.9 MG/DL — HIGH (ref 0.2–1.2)
BUN SERPL-MCNC: 10 MG/DL — SIGNIFICANT CHANGE UP (ref 7–23)
CALCIUM SERPL-MCNC: 8.3 MG/DL — LOW (ref 8.4–10.5)
CHLORIDE SERPL-SCNC: 109 MMOL/L — HIGH (ref 96–108)
CK MB BLD-MCNC: 6.9 % — HIGH (ref 0–3.5)
CK MB BLD-MCNC: 8.2 % — HIGH (ref 0–3.5)
CK MB CFR SERPL CALC: 54.7 NG/ML — HIGH (ref 0–3.8)
CK MB CFR SERPL CALC: 59.2 NG/ML — HIGH (ref 0–3.8)
CK SERPL-CCNC: 726 U/L — HIGH (ref 25–170)
CK SERPL-CCNC: 790 U/L — HIGH (ref 25–170)
CO2 BLDV-SCNC: 26 MMOL/L — SIGNIFICANT CHANGE UP (ref 22–30)
CO2 SERPL-SCNC: 20 MMOL/L — LOW (ref 22–31)
CREAT SERPL-MCNC: 0.61 MG/DL — SIGNIFICANT CHANGE UP (ref 0.5–1.3)
GAS PNL BLDA: SIGNIFICANT CHANGE UP
GAS PNL BLDV: SIGNIFICANT CHANGE UP
GLUCOSE SERPL-MCNC: 141 MG/DL — HIGH (ref 70–99)
HCO3 BLDV-SCNC: 25 MMOL/L — SIGNIFICANT CHANGE UP (ref 21–29)
HCT VFR BLD CALC: 25.1 % — LOW (ref 34.5–45)
HCT VFR BLD CALC: 26 % — LOW (ref 34.5–45)
HGB BLD-MCNC: 8.6 G/DL — LOW (ref 11.5–15.5)
HGB BLD-MCNC: 9 G/DL — LOW (ref 11.5–15.5)
HOROWITZ INDEX BLDV+IHG-RTO: 40 — SIGNIFICANT CHANGE UP
INR BLD: 1.35 RATIO — HIGH (ref 0.88–1.16)
INR BLD: 1.41 RATIO — HIGH (ref 0.88–1.16)
MCHC RBC-ENTMCNC: 31 PG — SIGNIFICANT CHANGE UP (ref 27–34)
MCHC RBC-ENTMCNC: 31.4 PG — SIGNIFICANT CHANGE UP (ref 27–34)
MCHC RBC-ENTMCNC: 34.4 GM/DL — SIGNIFICANT CHANGE UP (ref 32–36)
MCHC RBC-ENTMCNC: 34.6 GM/DL — SIGNIFICANT CHANGE UP (ref 32–36)
MCV RBC AUTO: 89.8 FL — SIGNIFICANT CHANGE UP (ref 80–100)
MCV RBC AUTO: 91.1 FL — SIGNIFICANT CHANGE UP (ref 80–100)
PCO2 BLDV: 49 MMHG — SIGNIFICANT CHANGE UP (ref 35–50)
PH BLDV: 7.32 — LOW (ref 7.35–7.45)
PLATELET # BLD AUTO: 75 K/UL — LOW (ref 150–400)
PLATELET # BLD AUTO: 83 K/UL — LOW (ref 150–400)
PO2 BLDV: 39 MMHG — SIGNIFICANT CHANGE UP (ref 25–45)
POTASSIUM SERPL-MCNC: 4.3 MMOL/L — SIGNIFICANT CHANGE UP (ref 3.5–5.3)
POTASSIUM SERPL-SCNC: 4.3 MMOL/L — SIGNIFICANT CHANGE UP (ref 3.5–5.3)
PROT SERPL-MCNC: 5.2 G/DL — LOW (ref 6–8.3)
PROTHROM AB SERPL-ACNC: 15.7 SEC — HIGH (ref 10–12.9)
PROTHROM AB SERPL-ACNC: 16.2 SEC — HIGH (ref 10–12.9)
RBC # BLD: 2.76 M/UL — LOW (ref 3.8–5.2)
RBC # BLD: 2.89 M/UL — LOW (ref 3.8–5.2)
RBC # FLD: 12 % — SIGNIFICANT CHANGE UP (ref 10.3–14.5)
RBC # FLD: 12.3 % — SIGNIFICANT CHANGE UP (ref 10.3–14.5)
SAO2 % BLDV: 65 % — LOW (ref 67–88)
SODIUM SERPL-SCNC: 143 MMOL/L — SIGNIFICANT CHANGE UP (ref 135–145)
TROPONIN T, HIGH SENSITIVITY RESULT: 1129 NG/L — HIGH (ref 0–51)
TROPONIN T, HIGH SENSITIVITY RESULT: 1216 NG/L — HIGH (ref 0–51)
WBC # BLD: 8 K/UL — SIGNIFICANT CHANGE UP (ref 3.8–10.5)
WBC # BLD: 9.7 K/UL — SIGNIFICANT CHANGE UP (ref 3.8–10.5)
WBC # FLD AUTO: 8 K/UL — SIGNIFICANT CHANGE UP (ref 3.8–10.5)
WBC # FLD AUTO: 9.7 K/UL — SIGNIFICANT CHANGE UP (ref 3.8–10.5)

## 2019-04-17 PROCEDURE — 93010 ELECTROCARDIOGRAM REPORT: CPT

## 2019-04-17 PROCEDURE — 71045 X-RAY EXAM CHEST 1 VIEW: CPT | Mod: 26

## 2019-04-17 PROCEDURE — 99291 CRITICAL CARE FIRST HOUR: CPT

## 2019-04-17 RX ORDER — POTASSIUM CHLORIDE 20 MEQ
10 PACKET (EA) ORAL
Qty: 0 | Refills: 0 | Status: COMPLETED | OUTPATIENT
Start: 2019-04-17 | End: 2019-04-17

## 2019-04-17 RX ORDER — FUROSEMIDE 40 MG
20 TABLET ORAL ONCE
Qty: 0 | Refills: 0 | Status: COMPLETED | OUTPATIENT
Start: 2019-04-17 | End: 2019-04-17

## 2019-04-17 RX ORDER — ATORVASTATIN CALCIUM 80 MG/1
20 TABLET, FILM COATED ORAL AT BEDTIME
Qty: 0 | Refills: 0 | Status: DISCONTINUED | OUTPATIENT
Start: 2019-04-17 | End: 2019-04-29

## 2019-04-17 RX ORDER — ENOXAPARIN SODIUM 100 MG/ML
40 INJECTION SUBCUTANEOUS DAILY
Qty: 0 | Refills: 0 | Status: DISCONTINUED | OUTPATIENT
Start: 2019-04-17 | End: 2019-04-17

## 2019-04-17 RX ORDER — VENLAFAXINE HCL 75 MG
37.5 CAPSULE, EXT RELEASE 24 HR ORAL DAILY
Qty: 0 | Refills: 0 | Status: DISCONTINUED | OUTPATIENT
Start: 2019-04-17 | End: 2019-04-24

## 2019-04-17 RX ORDER — ASPIRIN/CALCIUM CARB/MAGNESIUM 324 MG
81 TABLET ORAL DAILY
Qty: 0 | Refills: 0 | Status: DISCONTINUED | OUTPATIENT
Start: 2019-04-17 | End: 2019-04-29

## 2019-04-17 RX ORDER — METOCLOPRAMIDE HCL 10 MG
10 TABLET ORAL EVERY 8 HOURS
Qty: 0 | Refills: 0 | Status: COMPLETED | OUTPATIENT
Start: 2019-04-17 | End: 2019-04-17

## 2019-04-17 RX ORDER — HYDROMORPHONE HYDROCHLORIDE 2 MG/ML
0.5 INJECTION INTRAMUSCULAR; INTRAVENOUS; SUBCUTANEOUS ONCE
Qty: 0 | Refills: 0 | Status: DISCONTINUED | OUTPATIENT
Start: 2019-04-17 | End: 2019-04-17

## 2019-04-17 RX ORDER — ACETAMINOPHEN 500 MG
1000 TABLET ORAL ONCE
Qty: 0 | Refills: 0 | Status: COMPLETED | OUTPATIENT
Start: 2019-04-17 | End: 2019-04-17

## 2019-04-17 RX ORDER — INSULIN LISPRO 100/ML
VIAL (ML) SUBCUTANEOUS
Qty: 0 | Refills: 0 | Status: DISCONTINUED | OUTPATIENT
Start: 2019-04-17 | End: 2019-04-22

## 2019-04-17 RX ORDER — KETOROLAC TROMETHAMINE 30 MG/ML
30 SYRINGE (ML) INJECTION EVERY 8 HOURS
Qty: 0 | Refills: 0 | Status: DISCONTINUED | OUTPATIENT
Start: 2019-04-17 | End: 2019-04-17

## 2019-04-17 RX ORDER — INSULIN LISPRO 100/ML
VIAL (ML) SUBCUTANEOUS AT BEDTIME
Qty: 0 | Refills: 0 | Status: DISCONTINUED | OUTPATIENT
Start: 2019-04-17 | End: 2019-04-22

## 2019-04-17 RX ADMIN — Medication 12.51 MICROGRAM(S)/KG/MIN: at 08:00

## 2019-04-17 RX ADMIN — Medication 2: at 17:44

## 2019-04-17 RX ADMIN — Medication 100 MILLIEQUIVALENT(S): at 00:19

## 2019-04-17 RX ADMIN — Medication 30 MILLIGRAM(S): at 21:13

## 2019-04-17 RX ADMIN — Medication 400 MILLIGRAM(S): at 03:04

## 2019-04-17 RX ADMIN — Medication 10 MILLIGRAM(S): at 06:00

## 2019-04-17 RX ADMIN — HYDROMORPHONE HYDROCHLORIDE 0.5 MILLIGRAM(S): 2 INJECTION INTRAMUSCULAR; INTRAVENOUS; SUBCUTANEOUS at 06:15

## 2019-04-17 RX ADMIN — HYDROMORPHONE HYDROCHLORIDE 0.5 MILLIGRAM(S): 2 INJECTION INTRAMUSCULAR; INTRAVENOUS; SUBCUTANEOUS at 06:54

## 2019-04-17 RX ADMIN — Medication 12.51 MICROGRAM(S)/KG/MIN: at 21:29

## 2019-04-17 RX ADMIN — ATORVASTATIN CALCIUM 20 MILLIGRAM(S): 80 TABLET, FILM COATED ORAL at 21:12

## 2019-04-17 RX ADMIN — Medication 100 MILLIGRAM(S): at 17:24

## 2019-04-17 RX ADMIN — OXYCODONE AND ACETAMINOPHEN 1 TABLET(S): 5; 325 TABLET ORAL at 12:25

## 2019-04-17 RX ADMIN — HYDROMORPHONE HYDROCHLORIDE 0.5 MILLIGRAM(S): 2 INJECTION INTRAMUSCULAR; INTRAVENOUS; SUBCUTANEOUS at 03:18

## 2019-04-17 RX ADMIN — Medication 3 MILLILITER(S): at 05:29

## 2019-04-17 RX ADMIN — Medication 30 MILLIGRAM(S): at 15:00

## 2019-04-17 RX ADMIN — HYDROMORPHONE HYDROCHLORIDE 0.5 MILLIGRAM(S): 2 INJECTION INTRAMUSCULAR; INTRAVENOUS; SUBCUTANEOUS at 07:09

## 2019-04-17 RX ADMIN — HYDROMORPHONE HYDROCHLORIDE 0.5 MILLIGRAM(S): 2 INJECTION INTRAMUSCULAR; INTRAVENOUS; SUBCUTANEOUS at 06:00

## 2019-04-17 RX ADMIN — Medication 100 MILLIGRAM(S): at 06:01

## 2019-04-17 RX ADMIN — Medication 3 MILLILITER(S): at 17:44

## 2019-04-17 RX ADMIN — Medication 100 MILLIEQUIVALENT(S): at 06:45

## 2019-04-17 RX ADMIN — VASOPRESSIN 4 UNIT(S)/MIN: 20 INJECTION INTRAVENOUS at 21:29

## 2019-04-17 RX ADMIN — Medication 100 MILLIGRAM(S): at 13:54

## 2019-04-17 RX ADMIN — Medication 100 MILLIEQUIVALENT(S): at 07:13

## 2019-04-17 RX ADMIN — PANTOPRAZOLE SODIUM 40 MILLIGRAM(S): 20 TABLET, DELAYED RELEASE ORAL at 11:22

## 2019-04-17 RX ADMIN — OXYCODONE AND ACETAMINOPHEN 1 TABLET(S): 5; 325 TABLET ORAL at 12:55

## 2019-04-17 RX ADMIN — SODIUM CHLORIDE 3 MILLILITER(S): 9 INJECTION INTRAMUSCULAR; INTRAVENOUS; SUBCUTANEOUS at 21:04

## 2019-04-17 RX ADMIN — SODIUM CHLORIDE 3 MILLILITER(S): 9 INJECTION INTRAMUSCULAR; INTRAVENOUS; SUBCUTANEOUS at 14:03

## 2019-04-17 RX ADMIN — HYDROMORPHONE HYDROCHLORIDE 0.5 MILLIGRAM(S): 2 INJECTION INTRAMUSCULAR; INTRAVENOUS; SUBCUTANEOUS at 03:03

## 2019-04-17 RX ADMIN — SODIUM CHLORIDE 3 MILLILITER(S): 9 INJECTION INTRAMUSCULAR; INTRAVENOUS; SUBCUTANEOUS at 06:01

## 2019-04-17 RX ADMIN — Medication 10.01 MICROGRAM(S)/KG/MIN: at 08:00

## 2019-04-17 RX ADMIN — Medication 81 MILLIGRAM(S): at 11:22

## 2019-04-17 RX ADMIN — Medication 1000 MILLIGRAM(S): at 03:19

## 2019-04-17 RX ADMIN — Medication 30 MILLIGRAM(S): at 13:54

## 2019-04-17 RX ADMIN — Medication 30 MILLIGRAM(S): at 04:54

## 2019-04-17 RX ADMIN — Medication 10 MILLIGRAM(S): at 13:54

## 2019-04-17 RX ADMIN — Medication 100 MILLIGRAM(S): at 09:06

## 2019-04-17 RX ADMIN — Medication 3 MILLILITER(S): at 13:38

## 2019-04-17 RX ADMIN — Medication 100 MILLIEQUIVALENT(S): at 08:25

## 2019-04-17 RX ADMIN — Medication 30 MILLIGRAM(S): at 22:00

## 2019-04-17 RX ADMIN — Medication 100 MILLIGRAM(S): at 01:11

## 2019-04-17 RX ADMIN — Medication 37.5 MILLIGRAM(S): at 21:57

## 2019-04-17 RX ADMIN — Medication 30 MILLIGRAM(S): at 04:39

## 2019-04-17 RX ADMIN — FENTANYL CITRATE 50 MICROGRAM(S): 50 INJECTION INTRAVENOUS at 00:15

## 2019-04-17 RX ADMIN — Medication 10.01 MICROGRAM(S)/KG/MIN: at 21:29

## 2019-04-17 RX ADMIN — VASOPRESSIN 4 UNIT(S)/MIN: 20 INJECTION INTRAVENOUS at 08:00

## 2019-04-17 RX ADMIN — SODIUM CHLORIDE 10 MILLILITER(S): 9 INJECTION INTRAMUSCULAR; INTRAVENOUS; SUBCUTANEOUS at 21:29

## 2019-04-17 RX ADMIN — ENOXAPARIN SODIUM 40 MILLIGRAM(S): 100 INJECTION SUBCUTANEOUS at 17:25

## 2019-04-17 RX ADMIN — Medication 10 MILLIGRAM(S): at 21:12

## 2019-04-17 RX ADMIN — Medication 2: at 11:47

## 2019-04-17 RX ADMIN — Medication 20 MILLIGRAM(S): at 18:41

## 2019-04-17 NOTE — PROCEDURE NOTE - NSPROCDETAILS_GEN_ALL_CORE
sutured in place/hemostasis with direct pressure, dressing applied/location identified, draped/prepped, sterile technique used, needle inserted/introduced/positive blood return obtained via catheter/Seldinger technique/all materials/supplies accounted for at end of procedure/connected to a pressurized flush line

## 2019-04-17 NOTE — PROGRESS NOTE ADULT - SUBJECTIVE AND OBJECTIVE BOX
SHAW BURKS  MRN-[id number]  Patient is a 69y old  Female who presents with a chief complaint of MVR (16 Apr 2019 15:43)    HPI:  70 yo F with h/o HLD, MVP/MR, allergy induced Asthma (controlled) had f/u cardiology evaluation. ECHO revealed moderate to severe MR . Pt had cardiac cath /surgical consult- scheduled for mitral valve repair, possible replacement, radio frequency, Maze procedure, left atrial appendage closure on 04/17/2019. Pt denies any chest pain/SOB/syncope. (10 Apr 2019 10:14)      Surgery/Hospital course:    Today:    REVIEW OF SYSTEMS:  Gen:: No fever  EYES/ENT: No visual changes;  No vertigo or throat pain   NECK: No pain or stiffness  RES:  No shortness of breath or Cough  CARD: No chest pain   GI: No abdominal pain  : No dysuria  NEURO: No weakness  SKIN: No itching, rashes, or lesions     Physical Exam:  Vital Signs Last 24 Hrs  T(C): 37.4 (17 Apr 2019 03:00), Max: 37.4 (17 Apr 2019 03:00)  T(F): 99.3 (17 Apr 2019 03:00), Max: 99.3 (17 Apr 2019 03:00)  HR: 96 (17 Apr 2019 06:45) (76 - 96)  BP: 98/48 (17 Apr 2019 06:30) (98/48 - 126/78)  BP(mean): 69 (17 Apr 2019 06:30) (69 - 69)  RR: 18 (17 Apr 2019 06:45) (12 - 42)  SpO2: 95% (17 Apr 2019 06:45) (88% - 100%)  Gen:  Awake, alert,   CNS: non focal .	  Neck: no JVD  RES : clear , no wheezing      ; chest tubes                     CVS: Regular  rhythm. Normal S1/S2  Abd: Soft, non-distended. Bowel sounds present.  Skin: No rash.  Ext:  no edema, A Line  PSY:    ============================I/O===========================   I&O's Detail    16 Apr 2019 07:01  -  17 Apr 2019 06:58  --------------------------------------------------------  IN:    Albumin 5%  - 250 mL: 2000 mL    dexmedetomidine Infusion: 18.2 mL    DOBUTamine Infusion: 90 mL    DOBUTamine Infusion: 18 mL    insulin Infusion: 37.5 mL    IV PiggyBack: 950 mL    Lactated Ringers IV Bolus: 2750 mL    norepinephrine Infusion: 45.1 mL    sodium chloride 0.9%.: 150 mL    vasopressin Infusion: 80 mL  Total IN: 6138.8 mL    OUT:    Bulb: 245 mL    Chest Tube: 130 mL    Indwelling Catheter - Urethral: 3205 mL  Total OUT: 3580 mL    Total NET: 2558.8 mL        ============================ LABS =========================                        9.0    8.0   )-----------( 75       ( 17 Apr 2019 01:09 )             26.0     04-17    143  |  109<H>  |  10  ----------------------------<  141<H>  4.3   |  20<L>  |  0.61    Ca    8.3<L>      17 Apr 2019 01:09  Phos  3.7     04-17  Mg     2.5     04-17    TPro  5.2<L>  /  Alb  4.1  /  TBili  1.9<H>  /  DBili  x   /  AST  94<H>  /  ALT  22  /  AlkPhos  27<L>  04-17    LIVER FUNCTIONS - ( 17 Apr 2019 01:09 )  Alb: 4.1 g/dL / Pro: 5.2 g/dL / ALK PHOS: 27 U/L / ALT: 22 U/L / AST: 94 U/L / GGT: x           PT/INR - ( 17 Apr 2019 01:09 )   PT: 15.7 sec;   INR: 1.35 ratio         PTT - ( 17 Apr 2019 01:09 )  PTT:30.9 sec  ABG - ( 17 Apr 2019 06:05 )  pH, Arterial: 7.38  pH, Blood: x     /  pCO2: 39    /  pO2: 84    / HCO3: 23    / Base Excess: -1.5  /  SaO2: 98                      ======================Micro/Rad/Cardio=================  Culture: Reviewed   CXR: Reviewed  Echo:Reviewed  ======================================================  PAST MEDICAL & SURGICAL HISTORY:  Allergy-induced asthma, mild intermittent, uncomplicated: Denies any exacerbation or ED admission  Osteopenia  MVP (mitral valve prolapse)  Trigger finger  Hyperlipidemia  H/O laminectomy: 2017  Cataract: 10 yrs ago  Breast fibroadenoma in female, left: 1988      ====================ASSESMENT ==============  CNS:  RES:  CVS:  Hem:  Michael:  GI:  Endo:  ID:  Skin:    Plan:  ====================== NEUROLOGY=====================  dexmedetomidine Infusion 0.5 MICROgram(s)/kG/Hr IV Continuous  ketorolac   Injectable 30 milliGRAM(s) IV Push  meperidine     Injectable 25 milliGRAM(s) IV Push  metoclopramide Injectable 10 milliGRAM(s) IV Push  metoclopramide Injectable 10 milliGRAM(s) IV Push  oxyCODONE    5 mG/acetaminophen 325 mG 1 Tablet(s) Oral PRN  oxyCODONE    5 mG/acetaminophen 325 mG 2 Tablet(s) Oral PRN    ==================== RESPIRATORY======================  ALBUTerol    90 MICROgram(s) HFA Inhaler 1 Puff(s) Inhalation every 4 hours  ALBUTerol/ipratropium for Nebulization 3 milliLiter(s) Nebulizer every 6 hours  tiotropium 18 MICROgram(s) Capsule 1 Capsule(s) Inhalation daily    Mechanical Ventilation:  Mode: CPAP with PS  FiO2: 40  PEEP: 5  PS: 5  MAP: 7  PIP: 10    ====================CARDIOVASCULAR==================  DOBUTamine Infusion 5 MICROgram(s)/kG/Min IV Continuous <Continuous>  norepinephrine Infusion 0.1 MICROgram(s)/kG/Min IV Continuous <Continuous>    ===================HEMATOLOGIC/ONC ===================    ===================== RENAL =========================    ==================== GASTROINTESTINAL===================  docusate sodium 100 milliGRAM(s) Oral three times a day  pantoprazole  Injectable 40 milliGRAM(s) IV Push daily  potassium chloride  10 mEq/50 mL IVPB 10 milliEquivalent(s) IV Intermittent every 1 hour  potassium chloride  10 mEq/50 mL IVPB 10 milliEquivalent(s) IV Intermittent every 1 hour  potassium chloride  10 mEq/50 mL IVPB 10 milliEquivalent(s) IV Intermittent every 1 hour  potassium chloride  10 mEq/50 mL IVPB 10 milliEquivalent(s) IV Intermittent every 1 hour  sodium chloride 0.9% lock flush 3 milliLiter(s) IV Push every 8 hours  sodium chloride 0.9%. 1000 milliLiter(s) IV Continuous <Continuous>    =======================    ENDOCRINE  =====================  dextrose 50% Injectable 50  dextrose 50% Injectable 25  insulin Infusion 2  vasopressin Infusion 0.067    ========================INFECTIOUS DISEASE================  cefuroxime  IVPB 1500 milliGRAM(s) IV Intermittent every 8 hours      .crit SHAW BURKS  MRN-[id number]  Patient is a 69y old  Female who presents with a chief complaint of MVR (2019 15:43)    HPI:  70 yo F with h/o HLD, MVP/MR, allergy induced Asthma (controlled) had f/u cardiology evaluation. ECHO revealed moderate to severe MR . Pt had cardiac cath /surgical consult- scheduled for mitral valve repair, possible replacement, radio frequency, Maze procedure, left atrial appendage closure on 2019. Pt denies any chest pain/SOB/syncope. (10 Apr 2019 10:14)      Surgery/Hospital course:  4/15 MVR(T)/ KASIE/ Maze  on /levo vaso drips  weaned of vent and exubated  high lactic acid  AV paced for junctional rhythm bradycardia  c/o incisional pain , no sob     Physical Exam:  Vital Signs Last 24 Hrs  T(C): 37.4 (2019 03:00), Max: 37.4 (2019 03:00)  T(F): 99.3 (2019 03:00), Max: 99.3 (2019 03:00)  HR: 96 (2019 06:45) (76 - 96)  BP: 98/48 (2019 06:30) (98/48 - 126/78)  BP(mean): 69 (2019 06:30) (69 - 69)  RR: 18 (2019 06:45) (12 - 42)  SpO2: 95% (2019 06:45) (88% - 100%)  Gen:  Awake, alert,   CNS: non focal .	  Neck: no JVD  RES : clear , no wheezing      ;+ chest tubes                     CVS: Regular  rhythm. Normal S1/S2  Abd: Soft, non-distended. Bowel sounds present.  Skin: No rash.  Ext:  no edema, A Line      ============================I/O===========================   I&O's Detail    2019 07:01  -  2019 06:58  --------------------------------------------------------  IN:    Albumin 5%  - 250 mL: 2000 mL    dexmedetomidine Infusion: 18.2 mL    DOBUTamine Infusion: 90 mL    DOBUTamine Infusion: 18 mL    insulin Infusion: 37.5 mL    IV PiggyBack: 950 mL    Lactated Ringers IV Bolus: 2750 mL    norepinephrine Infusion: 45.1 mL    sodium chloride 0.9%.: 150 mL    vasopressin Infusion: 80 mL  Total IN: 6138.8 mL    OUT:    Bulb: 245 mL    Chest Tube: 130 mL    Indwelling Catheter - Urethral: 3205 mL  Total OUT: 3580 mL    Total NET: 2558.8 mL        ============================ LABS =========================                        9.0    8.0   )-----------( 75       ( 2019 01:09 )             26.0         143  |  109<H>  |  10  ----------------------------<  141<H>  4.3   |  20<L>  |  0.61    Ca    8.3<L>      2019 01:09  Phos  3.7       Mg     2.5         TPro  5.2<L>  /  Alb  4.1  /  TBili  1.9<H>  /  DBili  x   /  AST  94<H>  /  ALT  22  /  AlkPhos  27<L>      LIVER FUNCTIONS - ( 2019 01:09 )  Alb: 4.1 g/dL / Pro: 5.2 g/dL / ALK PHOS: 27 U/L / ALT: 22 U/L / AST: 94 U/L / GGT: x           PT/INR - ( 2019 01:09 )   PT: 15.7 sec;   INR: 1.35 ratio  PTT - ( 2019 01:09 )  PTT:30.9 sec  ABG - ( 2019 06:05 )  pH, Arterial: 7.38  pH, Blood: x     /  pCO2: 39    /  pO2: 84    / HCO3: 23    / Base Excess: -1.5  /  SaO2: 98      ======================Micro/Rad/Cardio=================  CXR: Reviewed  Echo:Reviewed  ======================================================  PAST MEDICAL & SURGICAL HISTORY:  Allergy-induced asthma, mild intermittent, uncomplicated: Denies any exacerbation or ED admission  Osteopenia  MVP (mitral valve prolapse)  Trigger finger  Hyperlipidemia  H/O laminectomy: 2017  Cataract: 10 yrs ago  Breast fibroadenoma in female, left:       ====================ASSESMENT ==============  4/15 MVR(T)/ KASIE/ Maze  acute respiratory failure post op  bradycardia ( junctional rhythm)  Hypotension  Hyperglycemia  asthma  Hyperlipidemia    Plan:  ====================== NEUROLOGY=====================  dexmedetomidine Infusion 0.5 MICROgram(s)/kG/Hr IV Continuous  ketorolac   Injectable 30 milliGRAM(s) IV Push  meperidine     Injectable 25 milliGRAM(s) IV Push  metoclopramide Injectable 10 milliGRAM(s) IV Push  metoclopramide Injectable 10 milliGRAM(s) IV Push  oxyCODONE    5 mG/acetaminophen 325 mG 1 Tablet(s) Oral PRN  oxyCODONE    5 mG/acetaminophen 325 mG 2 Tablet(s) Oral PRN    ==================== RESPIRATORY======================  ALBUTerol    90 MICROgram(s) HFA Inhaler 1 Puff(s) Inhalation every 4 hours  ALBUTerol/ipratropium for Nebulization 3 milliLiter(s) Nebulizer every 6 hours  tiotropium 18 MICROgram(s) Capsule 1 Capsule(s) Inhalation daily    Mechanical Ventilation:  Mode: CPAP with PS  FiO2: 40  PEEP: 5  PS: 5  MAP: 7  PIP: 10  wean to extubate  ====================CARDIOVASCULAR==================  DOBUTamine Infusion 5 MICROgram(s)/kG/Min IV Continuous <Continuous>  norepinephrine Infusion 0.1 MICROgram(s)/kG/Min IV Continuous <Continuous>    ===================HEMATOLOGIC/ONC ===================    ===================== RENAL =========================  folwy cath   ==================== GASTROINTESTINAL===================  docusate sodium 100 milliGRAM(s) Oral three times a day  pantoprazole  Injectable 40 milliGRAM(s) IV Push daily  potassium chloride  10 mEq/50 mL IVPB 10 milliEquivalent(s) IV Intermittent every 1 hour  potassium chloride  10 mEq/50 mL IVPB 10 milliEquivalent(s) IV Intermittent every 1 hour  potassium chloride  10 mEq/50 mL IVPB 10 milliEquivalent(s) IV Intermittent every 1 hour  potassium chloride  10 mEq/50 mL IVPB 10 milliEquivalent(s) IV Intermittent every 1 hour  sodium chloride 0.9% lock flush 3 milliLiter(s) IV Push every 8 hours  sodium chloride 0.9%. 1000 milliLiter(s) IV Continuous <Continuous>    =======================    ENDOCRINE  =====================  dextrose 50% Injectable 50  dextrose 50% Injectable 25  insulin Infusion 2  vasopressin Infusion 0.067    ========================INFECTIOUS DISEASE================  cefuroxime  IVPB 1500 milliGRAM(s) IV Intermittent every 8 hours      Patient requires continuous monitoring with bedside rhythm monitoring,arterial line,pulse oximetry,ventilator monitoring;intermittent blood gas analysis.  Care plan discussed with ICU care team.  patient remain critical; required more than usual post op care; I have spent 35 minutes providing non routine post op care.

## 2019-04-17 NOTE — PHYSICAL THERAPY INITIAL EVALUATION ADULT - ADDITIONAL COMMENTS
As per pt, pt lives in a condo w/ no steps to enter. PTA pt was independent w/ all mobility and ADLs.

## 2019-04-17 NOTE — PHYSICAL THERAPY INITIAL EVALUATION ADULT - PERTINENT HX OF CURRENT PROBLEM, REHAB EVAL
Pt is a 68 yo F  s/p Maze procedure, MVR, left atrial appendage closure on 04/16/2019 on with h/o HLD, MVP/MR, allergy induced Asthma (controlled). ECHO revealed moderate to severe MR .

## 2019-04-18 LAB
ALBUMIN SERPL ELPH-MCNC: 3.5 G/DL — SIGNIFICANT CHANGE UP (ref 3.3–5)
ALP SERPL-CCNC: 37 U/L — LOW (ref 40–120)
ALT FLD-CCNC: 27 U/L — SIGNIFICANT CHANGE UP (ref 10–45)
ANION GAP SERPL CALC-SCNC: 9 MMOL/L — SIGNIFICANT CHANGE UP (ref 5–17)
AST SERPL-CCNC: 87 U/L — HIGH (ref 10–40)
BILIRUB SERPL-MCNC: 1.6 MG/DL — HIGH (ref 0.2–1.2)
BUN SERPL-MCNC: 11 MG/DL — SIGNIFICANT CHANGE UP (ref 7–23)
CALCIUM SERPL-MCNC: 8.6 MG/DL — SIGNIFICANT CHANGE UP (ref 8.4–10.5)
CHLORIDE SERPL-SCNC: 100 MMOL/L — SIGNIFICANT CHANGE UP (ref 96–108)
CO2 SERPL-SCNC: 20 MMOL/L — LOW (ref 22–31)
CREAT SERPL-MCNC: 0.52 MG/DL — SIGNIFICANT CHANGE UP (ref 0.5–1.3)
GAS PNL BLDA: SIGNIFICANT CHANGE UP
GAS PNL BLDA: SIGNIFICANT CHANGE UP
GLUCOSE SERPL-MCNC: 189 MG/DL — HIGH (ref 70–99)
HCT VFR BLD CALC: 30.6 % — LOW (ref 34.5–45)
HGB BLD-MCNC: 10.4 G/DL — LOW (ref 11.5–15.5)
INR BLD: 1.13 RATIO — SIGNIFICANT CHANGE UP (ref 0.88–1.16)
MAGNESIUM SERPL-MCNC: 2 MG/DL — SIGNIFICANT CHANGE UP (ref 1.6–2.6)
MCHC RBC-ENTMCNC: 30.7 PG — SIGNIFICANT CHANGE UP (ref 27–34)
MCHC RBC-ENTMCNC: 34 GM/DL — SIGNIFICANT CHANGE UP (ref 32–36)
MCV RBC AUTO: 90.3 FL — SIGNIFICANT CHANGE UP (ref 80–100)
PHOSPHATE SERPL-MCNC: 2 MG/DL — LOW (ref 2.5–4.5)
PHOSPHATE SERPL-MCNC: 2.4 MG/DL — LOW (ref 2.5–4.5)
PLATELET # BLD AUTO: 86 K/UL — LOW (ref 150–400)
POTASSIUM SERPL-MCNC: 4.9 MMOL/L — SIGNIFICANT CHANGE UP (ref 3.5–5.3)
POTASSIUM SERPL-SCNC: 4.9 MMOL/L — SIGNIFICANT CHANGE UP (ref 3.5–5.3)
PROT SERPL-MCNC: 5.4 G/DL — LOW (ref 6–8.3)
PROTHROM AB SERPL-ACNC: 13.1 SEC — HIGH (ref 10–12.9)
RBC # BLD: 3.39 M/UL — LOW (ref 3.8–5.2)
RBC # FLD: 12.3 % — SIGNIFICANT CHANGE UP (ref 10.3–14.5)
SODIUM SERPL-SCNC: 129 MMOL/L — LOW (ref 135–145)
WBC # BLD: 10.7 K/UL — HIGH (ref 3.8–10.5)
WBC # FLD AUTO: 10.7 K/UL — HIGH (ref 3.8–10.5)

## 2019-04-18 PROCEDURE — 99291 CRITICAL CARE FIRST HOUR: CPT

## 2019-04-18 PROCEDURE — 93306 TTE W/DOPPLER COMPLETE: CPT | Mod: 26

## 2019-04-18 PROCEDURE — 71045 X-RAY EXAM CHEST 1 VIEW: CPT | Mod: 26

## 2019-04-18 RX ORDER — FUROSEMIDE 40 MG
40 TABLET ORAL DAILY
Qty: 0 | Refills: 0 | Status: DISCONTINUED | OUTPATIENT
Start: 2019-04-18 | End: 2019-04-18

## 2019-04-18 RX ORDER — ALBUMIN HUMAN 25 %
250 VIAL (ML) INTRAVENOUS ONCE
Qty: 0 | Refills: 0 | Status: COMPLETED | OUTPATIENT
Start: 2019-04-18 | End: 2019-04-18

## 2019-04-18 RX ORDER — FUROSEMIDE 40 MG
20 TABLET ORAL ONCE
Qty: 0 | Refills: 0 | Status: COMPLETED | OUTPATIENT
Start: 2019-04-18 | End: 2019-04-18

## 2019-04-18 RX ORDER — POTASSIUM CHLORIDE 20 MEQ
20 PACKET (EA) ORAL ONCE
Qty: 0 | Refills: 0 | Status: COMPLETED | OUTPATIENT
Start: 2019-04-18 | End: 2019-04-18

## 2019-04-18 RX ORDER — LANOLIN ALCOHOL/MO/W.PET/CERES
5 CREAM (GRAM) TOPICAL AT BEDTIME
Qty: 0 | Refills: 0 | Status: DISCONTINUED | OUTPATIENT
Start: 2019-04-18 | End: 2019-04-29

## 2019-04-18 RX ORDER — BUDESONIDE, MICRONIZED 100 %
0.5 POWDER (GRAM) MISCELLANEOUS
Qty: 0 | Refills: 0 | Status: DISCONTINUED | OUTPATIENT
Start: 2019-04-18 | End: 2019-04-29

## 2019-04-18 RX ORDER — WARFARIN SODIUM 2.5 MG/1
2 TABLET ORAL ONCE
Qty: 0 | Refills: 0 | Status: COMPLETED | OUTPATIENT
Start: 2019-04-18 | End: 2019-04-18

## 2019-04-18 RX ORDER — ASPIRIN/CALCIUM CARB/MAGNESIUM 324 MG
325 TABLET ORAL DAILY
Qty: 0 | Refills: 0 | Status: DISCONTINUED | OUTPATIENT
Start: 2019-04-18 | End: 2019-04-18

## 2019-04-18 RX ORDER — ENOXAPARIN SODIUM 100 MG/ML
40 INJECTION SUBCUTANEOUS DAILY
Qty: 0 | Refills: 0 | Status: DISCONTINUED | OUTPATIENT
Start: 2019-04-18 | End: 2019-04-19

## 2019-04-18 RX ORDER — FUROSEMIDE 40 MG
10 TABLET ORAL ONCE
Qty: 0 | Refills: 0 | Status: COMPLETED | OUTPATIENT
Start: 2019-04-18 | End: 2019-04-18

## 2019-04-18 RX ORDER — HYDROMORPHONE HYDROCHLORIDE 2 MG/ML
0.5 INJECTION INTRAMUSCULAR; INTRAVENOUS; SUBCUTANEOUS ONCE
Qty: 0 | Refills: 0 | Status: DISCONTINUED | OUTPATIENT
Start: 2019-04-18 | End: 2019-04-18

## 2019-04-18 RX ORDER — PANTOPRAZOLE SODIUM 20 MG/1
40 TABLET, DELAYED RELEASE ORAL
Qty: 0 | Refills: 0 | Status: DISCONTINUED | OUTPATIENT
Start: 2019-04-18 | End: 2019-04-29

## 2019-04-18 RX ORDER — WARFARIN SODIUM 2.5 MG/1
2 TABLET ORAL ONCE
Qty: 0 | Refills: 0 | Status: DISCONTINUED | OUTPATIENT
Start: 2019-04-18 | End: 2019-04-18

## 2019-04-18 RX ADMIN — Medication 500 MILLILITER(S): at 13:21

## 2019-04-18 RX ADMIN — Medication 10 MILLIGRAM(S): at 18:25

## 2019-04-18 RX ADMIN — Medication 2: at 08:21

## 2019-04-18 RX ADMIN — Medication 20 MILLIGRAM(S): at 23:16

## 2019-04-18 RX ADMIN — Medication 0.5 MILLIGRAM(S): at 17:26

## 2019-04-18 RX ADMIN — Medication 3 MILLILITER(S): at 05:25

## 2019-04-18 RX ADMIN — Medication 62.5 MILLIMOLE(S): at 12:41

## 2019-04-18 RX ADMIN — Medication 100 MILLIGRAM(S): at 01:30

## 2019-04-18 RX ADMIN — Medication 125 MILLILITER(S): at 15:11

## 2019-04-18 RX ADMIN — Medication 3 MILLILITER(S): at 12:35

## 2019-04-18 RX ADMIN — Medication 62.5 MILLIMOLE(S): at 04:37

## 2019-04-18 RX ADMIN — Medication 40 MILLIGRAM(S): at 05:46

## 2019-04-18 RX ADMIN — SODIUM CHLORIDE 3 MILLILITER(S): 9 INJECTION INTRAMUSCULAR; INTRAVENOUS; SUBCUTANEOUS at 05:14

## 2019-04-18 RX ADMIN — Medication 5 MILLIGRAM(S): at 22:12

## 2019-04-18 RX ADMIN — Medication 3 MILLILITER(S): at 17:26

## 2019-04-18 RX ADMIN — PANTOPRAZOLE SODIUM 40 MILLIGRAM(S): 20 TABLET, DELAYED RELEASE ORAL at 08:20

## 2019-04-18 RX ADMIN — Medication 2: at 18:25

## 2019-04-18 RX ADMIN — SODIUM CHLORIDE 3 MILLILITER(S): 9 INJECTION INTRAMUSCULAR; INTRAVENOUS; SUBCUTANEOUS at 21:15

## 2019-04-18 RX ADMIN — Medication 81 MILLIGRAM(S): at 05:46

## 2019-04-18 RX ADMIN — WARFARIN SODIUM 2 MILLIGRAM(S): 2.5 TABLET ORAL at 19:43

## 2019-04-18 RX ADMIN — Medication 500 MILLILITER(S): at 15:36

## 2019-04-18 RX ADMIN — SODIUM CHLORIDE 3 MILLILITER(S): 9 INJECTION INTRAMUSCULAR; INTRAVENOUS; SUBCUTANEOUS at 13:22

## 2019-04-18 RX ADMIN — ENOXAPARIN SODIUM 40 MILLIGRAM(S): 100 INJECTION SUBCUTANEOUS at 12:42

## 2019-04-18 RX ADMIN — ATORVASTATIN CALCIUM 20 MILLIGRAM(S): 80 TABLET, FILM COATED ORAL at 22:12

## 2019-04-18 RX ADMIN — Medication 37.5 MILLIGRAM(S): at 22:12

## 2019-04-18 RX ADMIN — Medication 10.01 MICROGRAM(S)/KG/MIN: at 23:39

## 2019-04-18 NOTE — PROGRESS NOTE ADULT - SUBJECTIVE AND OBJECTIVE BOX
SHAW BURKS  MRN-[id number]  Patient is a 69y old  Female who presents with a chief complaint of MVR (16 Apr 2019 15:43)    HPI:  68 yo F with h/o HLD, MVP/MR, allergy induced Asthma (controlled) had f/u cardiology evaluation. ECHO revealed moderate to severe MR . Pt had cardiac cath /surgical consult- scheduled for mitral valve repair, possible replacement, radio frequency, Maze procedure, left atrial appendage closure on 04/17/2019. Pt denies any chest pain/SOB/syncope. (10 Apr 2019 10:14)      Surgery/Hospital course:    Today:    REVIEW OF SYSTEMS:  Gen:: No fever  EYES/ENT: No visual changes;  No vertigo or throat pain   NECK: No pain or stiffness  RES:  No shortness of breath or Cough  CARD: No chest pain   GI: No abdominal pain  : No dysuria  NEURO: No weakness  SKIN: No itching, rashes, or lesions     Physical Exam:  Vital Signs Last 24 Hrs  T(C): 37.9 (18 Apr 2019 08:00), Max: 38.1 (17 Apr 2019 16:00)  T(F): 100.2 (18 Apr 2019 08:00), Max: 100.6 (17 Apr 2019 16:00)  HR: 101 (18 Apr 2019 09:00) (90 - 101)  BP: 94/54 (18 Apr 2019 09:00) (94/54 - 132/63)  BP(mean): 69 (18 Apr 2019 09:00) (58 - 90)  RR: 29 (18 Apr 2019 09:00) (15 - 41)  SpO2: 97% (18 Apr 2019 09:00) (90% - 99%)  Gen:  Awake, alert,   CNS: non focal .	  Neck: no JVD  RES : clear , no wheezing      ; chest tubes                     CVS: Regular  rhythm. Normal S1/S2  Abd: Soft, non-distended. Bowel sounds present.  Skin: No rash.  Ext:  no edema, A Line  PSY:    ============================I/O===========================   I&O's Detail    17 Apr 2019 07:01  -  18 Apr 2019 07:00  --------------------------------------------------------  IN:    DOBUTamine Infusion: 240 mL    IV PiggyBack: 400 mL    norepinephrine Infusion: 120 mL    Oral Fluid: 240 mL    Packed Red Blood Cells: 350 mL    sodium chloride 0.9%.: 240 mL    vasopressin Infusion: 138 mL  Total IN: 1728 mL    OUT:    Bulb: 130 mL    Chest Tube: 85 mL    Indwelling Catheter - Urethral: 1690 mL  Total OUT: 1905 mL    Total NET: -177 mL      18 Apr 2019 07:01  -  18 Apr 2019 10:12  --------------------------------------------------------  IN:    DOBUTamine Infusion: 15 mL    sodium chloride 0.9%.: 20 mL  Total IN: 35 mL    OUT:    Indwelling Catheter - Urethral: 675 mL  Total OUT: 675 mL    Total NET: -640 mL        ============================ LABS =========================                        10.4   10.7  )-----------( 86       ( 18 Apr 2019 01:08 )             30.6     04-18    129<L>  |  100  |  11  ----------------------------<  189<H>  4.9   |  20<L>  |  0.52    Ca    8.6      18 Apr 2019 01:08  Phos  2.0     04-18  Mg     2.0     04-18    TPro  5.4<L>  /  Alb  3.5  /  TBili  1.6<H>  /  DBili  x   /  AST  87<H>  /  ALT  27  /  AlkPhos  37<L>  04-18    LIVER FUNCTIONS - ( 18 Apr 2019 01:08 )  Alb: 3.5 g/dL / Pro: 5.4 g/dL / ALK PHOS: 37 U/L / ALT: 27 U/L / AST: 87 U/L / GGT: x           PT/INR - ( 17 Apr 2019 14:49 )   PT: 16.2 sec;   INR: 1.41 ratio         PTT - ( 17 Apr 2019 01:09 )  PTT:30.9 sec  ABG - ( 18 Apr 2019 05:06 )  pH, Arterial: 7.47  pH, Blood: x     /  pCO2: 31    /  pO2: 94    / HCO3: 22    / Base Excess: -.4   /  SaO2: 98                      ======================Micro/Rad/Cardio=================  Culture: Reviewed   CXR: Reviewed  Echo:Reviewed  ======================================================  PAST MEDICAL & SURGICAL HISTORY:  Allergy-induced asthma, mild intermittent, uncomplicated: Denies any exacerbation or ED admission  Osteopenia  MVP (mitral valve prolapse)  Trigger finger  Hyperlipidemia  H/O laminectomy: 2017  Cataract: 10 yrs ago  Breast fibroadenoma in female, left: 1988      ====================ASSESMENT ==============  CNS:  RES:  CVS:  Hem:  Michael:  GI:  Endo:  ID:  Skin:    Plan:  ====================== NEUROLOGY=====================  oxyCODONE    5 mG/acetaminophen 325 mG 1 Tablet(s) Oral PRN  oxyCODONE    5 mG/acetaminophen 325 mG 2 Tablet(s) Oral PRN  venlafaxine XR. 37.5 milliGRAM(s) Oral    ==================== RESPIRATORY======================  ALBUTerol    90 MICROgram(s) HFA Inhaler 1 Puff(s) Inhalation every 4 hours  ALBUTerol/ipratropium for Nebulization 3 milliLiter(s) Nebulizer every 6 hours  tiotropium 18 MICROgram(s) Capsule 1 Capsule(s) Inhalation daily      ====================CARDIOVASCULAR==================  DOBUTamine Infusion 2.5 MICROgram(s)/kG/Min IV Continuous <Continuous>  norepinephrine Infusion 0.1 MICROgram(s)/kG/Min IV Continuous <Continuous>    ===================HEMATOLOGIC/ONC ===================  aspirin  chewable 81 milliGRAM(s) Oral daily  enoxaparin Injectable 40 milliGRAM(s) SubCutaneous daily  warfarin 2 milliGRAM(s) Oral once    ===================== RENAL =========================    ==================== GASTROINTESTINAL===================  docusate sodium 100 milliGRAM(s) Oral three times a day  pantoprazole    Tablet 40 milliGRAM(s) Oral before breakfast  potassium chloride  10 mEq/50 mL IVPB 10 milliEquivalent(s) IV Intermittent every 1 hour  potassium chloride  10 mEq/50 mL IVPB 10 milliEquivalent(s) IV Intermittent every 1 hour  potassium chloride  10 mEq/50 mL IVPB 10 milliEquivalent(s) IV Intermittent every 1 hour  sodium chloride 0.9% lock flush 3 milliLiter(s) IV Push every 8 hours  sodium chloride 0.9%. 1000 milliLiter(s) IV Continuous <Continuous>  sodium phosphate IVPB 15 milliMole(s) IV Intermittent once    =======================    ENDOCRINE  =====================  atorvastatin 20  dextrose 50% Injectable 50  dextrose 50% Injectable 25  insulin lispro (HumaLOG) corrective regimen sliding scale   insulin lispro (HumaLOG) corrective regimen sliding scale   vasopressin Infusion 0.067    ========================INFECTIOUS DISEASE================      .crit SHAW BURKS  MRN-[id number]  Patient is a 69y old  Female who presents with a chief complaint of MVR (2019 15:43)    HPI:  70 yo F with h/o HLD, MVP/MR, allergy induced Asthma (controlled) had f/u cardiology evaluation. ECHO revealed moderate to severe MR . Pt had cardiac cath /surgical consult- scheduled for mitral valve repair, possible replacement, radio frequency, Maze procedure, left atrial appendage closure on 2019. Pt denies any chest pain/SOB/syncope. (10 Apr 2019 10:14)      Surgery/Hospital course:  4/15 MVR(T)/ KASIE/ Maze  on / vaso drips  AV paced for junctional rhythm bradycardia and hypotension      Physical Exam:  Vital Signs Last 24 Hrs  T(C): 37.9 (2019 08:00), Max: 38.1 (2019 16:00)  T(F): 100.2 (2019 08:00), Max: 100.6 (2019 16:00)  HR: 101 (2019 09:00) (90 - 101)  BP: 94/54 (2019 09:00) (94/54 - 132/63)  BP(mean): 69 (2019 09:00) (58 - 90)  RR: 29 (2019 09:00) (15 - 41)  SpO2: 97% (2019 09:00) (90% - 99%)  Gen:  Awake, alert,   CNS: non focal .	  Neck: no JVD  RES : clear , no wheezing      ; chest tubes                     CVS: paced   rhythm. Normal S1/S2  Abd: Soft, non-distended. Bowel sounds present.  Skin: No rash.  Ext:  no edema, A Line      ============================I/O===========================   I&O's Detail    2019 07:01  -  2019 07:00  --------------------------------------------------------  IN:    DOBUTamine Infusion: 240 mL    IV PiggyBack: 400 mL    norepinephrine Infusion: 120 mL    Oral Fluid: 240 mL    Packed Red Blood Cells: 350 mL    sodium chloride 0.9%.: 240 mL    vasopressin Infusion: 138 mL  Total IN: 1728 mL    OUT:    Bulb: 130 mL    Chest Tube: 85 mL    Indwelling Catheter - Urethral: 1690 mL  Total OUT: 1905 mL    Total NET: -177 mL      2019 07:01  -  2019 10:12  --------------------------------------------------------  IN:    DOBUTamine Infusion: 15 mL    sodium chloride 0.9%.: 20 mL  Total IN: 35 mL    OUT:    Indwelling Catheter - Urethral: 675 mL  Total OUT: 675 mL    Total NET: -640 mL        ============================ LABS =========================                        10.4   10.7  )-----------( 86       ( 2019 01:08 )             30.6     04-18    129<L>  |  100  |  11  ----------------------------<  189<H>  4.9   |  20<L>  |  0.52    Ca    8.6      2019 01:08  Phos  2.0     -  Mg     2.0     18    TPro  5.4<L>  /  Alb  3.5  /  TBili  1.6<H>  /  DBili  x   /  AST  87<H>  /  ALT  27  /  AlkPhos  37<L>  18    LIVER FUNCTIONS - ( 2019 01:08 )  Alb: 3.5 g/dL / Pro: 5.4 g/dL / ALK PHOS: 37 U/L / ALT: 27 U/L / AST: 87 U/L / GGT: x           PT/INR - ( 2019 14:49 )   PT: 16.2 sec;   INR: 1.41 ratio         PTT - ( 2019 01:09 )  PTT:30.9 sec  ABG - ( 2019 05:06 )  pH, Arterial: 7.47  pH, Blood: x     /  pCO2: 31    /  pO2: 94    / HCO3: 22    / Base Excess: -.4   /  SaO2: 98        ======================Micro/Rad/Cardio=================  CXR: Reviewed  Echo:Reviewed  ======================================================  PAST MEDICAL & SURGICAL HISTORY:  Allergy-induced asthma, mild intermittent, uncomplicated: Denies any exacerbation or ED admission  Osteopenia  MVP (mitral valve prolapse)  Trigger finger  Hyperlipidemia  H/O laminectomy:   Cataract: 10 yrs ago  Breast fibroadenoma in female, left:       ====================ASSESMENT ==============  4/15 MVR(T)/ KASIE/ Maze  acute systolic CHF R sided  bradycardia ( junctional rhythm)/paced   Hypotension  asthma  Hyperlipidemia    Plan:  ====================== NEUROLOGY=====================  oxyCODONE    5 mG/acetaminophen 325 mG 1 Tablet(s) Oral PRN  oxyCODONE    5 mG/acetaminophen 325 mG 2 Tablet(s) Oral PRN  venlafaxine XR. 37.5 milliGRAM(s) Oral    ==================== RESPIRATORY======================  ALBUTerol    90 MICROgram(s) HFA Inhaler 1 Puff(s) Inhalation every 4 hours  ALBUTerol/ipratropium for Nebulization 3 milliLiter(s) Nebulizer every 6 hours  tiotropium 18 MICROgram(s) Capsule 1 Capsule(s) Inhalation daily    ====================CARDIOVASCULAR==================  DOBUTamine Infusion 2.5 MICROgram(s)/kG/Min IV Continuous <Continuous>  norepinephrine Infusion 0.1 MICROgram(s)/kG/Min IV Continuous <Continuous>  echo done decrease rv fx  ===================HEMATOLOGIC/ONC ===================  aspirin  chewable 81 milliGRAM(s) Oral daily  enoxaparin Injectable 40 milliGRAM(s) SubCutaneous daily  warfarin 2 milliGRAM(s) Oral once    ===================== RENAL =========================    ==================== GASTROINTESTINAL===================  docusate sodium 100 milliGRAM(s) Oral three times a day  pantoprazole    Tablet 40 milliGRAM(s) Oral before breakfast  potassium chloride  10 mEq/50 mL IVPB 10 milliEquivalent(s) IV Intermittent every 1 hour  potassium chloride  10 mEq/50 mL IVPB 10 milliEquivalent(s) IV Intermittent every 1 hour  potassium chloride  10 mEq/50 mL IVPB 10 milliEquivalent(s) IV Intermittent every 1 hour  sodium chloride 0.9% lock flush 3 milliLiter(s) IV Push every 8 hours  sodium chloride 0.9%. 1000 milliLiter(s) IV Continuous <Continuous>  sodium phosphate IVPB 15 milliMole(s) IV Intermittent once    =======================    ENDOCRINE  =====================  atorvastatin 20  dextrose 50% Injectable 50  dextrose 50% Injectable 25  insulin lispro (HumaLOG) corrective regimen sliding scale   insulin lispro (HumaLOG) corrective regimen sliding scale   vasopressin Infusion 0.067    Patient requires continuous monitoring with bedside rhythm monitoring,arterial line,pulse oximetry, ;intermittent blood gas analysis.  Care plan discussed with ICU care team.  patient remain critical; required more than usual post op care; I have spent 30 minutes providing non routine post op care.

## 2019-04-19 LAB
ALBUMIN SERPL ELPH-MCNC: 3.8 G/DL — SIGNIFICANT CHANGE UP (ref 3.3–5)
ALP SERPL-CCNC: 69 U/L — SIGNIFICANT CHANGE UP (ref 40–120)
ALT FLD-CCNC: 30 U/L — SIGNIFICANT CHANGE UP (ref 10–45)
ANION GAP SERPL CALC-SCNC: 10 MMOL/L — SIGNIFICANT CHANGE UP (ref 5–17)
ANION GAP SERPL CALC-SCNC: 13 MMOL/L — SIGNIFICANT CHANGE UP (ref 5–17)
ANION GAP SERPL CALC-SCNC: 8 MMOL/L — SIGNIFICANT CHANGE UP (ref 5–17)
APTT BLD: 26.4 SEC — LOW (ref 27.5–36.3)
AST SERPL-CCNC: 57 U/L — HIGH (ref 10–40)
BASE EXCESS BLDV CALC-SCNC: 1.9 MMOL/L — SIGNIFICANT CHANGE UP (ref -2–2)
BASE EXCESS BLDV CALC-SCNC: 2.5 MMOL/L — HIGH (ref -2–2)
BILIRUB SERPL-MCNC: 1.6 MG/DL — HIGH (ref 0.2–1.2)
BUN SERPL-MCNC: 10 MG/DL — SIGNIFICANT CHANGE UP (ref 7–23)
BUN SERPL-MCNC: 8 MG/DL — SIGNIFICANT CHANGE UP (ref 7–23)
BUN SERPL-MCNC: 8 MG/DL — SIGNIFICANT CHANGE UP (ref 7–23)
CA-I SERPL-SCNC: 1.13 MMOL/L — SIGNIFICANT CHANGE UP (ref 1.12–1.3)
CALCIUM SERPL-MCNC: 8.3 MG/DL — LOW (ref 8.4–10.5)
CALCIUM SERPL-MCNC: 8.3 MG/DL — LOW (ref 8.4–10.5)
CALCIUM SERPL-MCNC: 8.8 MG/DL — SIGNIFICANT CHANGE UP (ref 8.4–10.5)
CHLORIDE BLDV-SCNC: 95 MMOL/L — LOW (ref 96–108)
CHLORIDE SERPL-SCNC: 94 MMOL/L — LOW (ref 96–108)
CHLORIDE SERPL-SCNC: 98 MMOL/L — SIGNIFICANT CHANGE UP (ref 96–108)
CHLORIDE SERPL-SCNC: 98 MMOL/L — SIGNIFICANT CHANGE UP (ref 96–108)
CO2 BLDV-SCNC: 26 MMOL/L — SIGNIFICANT CHANGE UP (ref 22–30)
CO2 BLDV-SCNC: 27 MMOL/L — SIGNIFICANT CHANGE UP (ref 22–30)
CO2 SERPL-SCNC: 21 MMOL/L — LOW (ref 22–31)
CO2 SERPL-SCNC: 22 MMOL/L — SIGNIFICANT CHANGE UP (ref 22–31)
CO2 SERPL-SCNC: 22 MMOL/L — SIGNIFICANT CHANGE UP (ref 22–31)
CREAT SERPL-MCNC: 0.37 MG/DL — LOW (ref 0.5–1.3)
CREAT SERPL-MCNC: 0.43 MG/DL — LOW (ref 0.5–1.3)
CREAT SERPL-MCNC: 0.43 MG/DL — LOW (ref 0.5–1.3)
GAS PNL BLDV: 124 MMOL/L — LOW (ref 136–145)
GAS PNL BLDV: SIGNIFICANT CHANGE UP
GLUCOSE BLDV-MCNC: 125 MG/DL — HIGH (ref 70–99)
GLUCOSE SERPL-MCNC: 102 MG/DL — HIGH (ref 70–99)
GLUCOSE SERPL-MCNC: 104 MG/DL — HIGH (ref 70–99)
GLUCOSE SERPL-MCNC: 140 MG/DL — HIGH (ref 70–99)
HCO3 BLDV-SCNC: 25 MMOL/L — SIGNIFICANT CHANGE UP (ref 21–29)
HCO3 BLDV-SCNC: 26 MMOL/L — SIGNIFICANT CHANGE UP (ref 21–29)
HCT VFR BLD CALC: 29.5 % — LOW (ref 34.5–45)
HCT VFR BLDA CALC: 31 % — LOW (ref 39–50)
HGB BLD CALC-MCNC: 10 G/DL — LOW (ref 11.5–15.5)
HGB BLD-MCNC: 10 G/DL — LOW (ref 11.5–15.5)
HOROWITZ INDEX BLDV+IHG-RTO: 40 — SIGNIFICANT CHANGE UP
HOROWITZ INDEX BLDV+IHG-RTO: 40 — SIGNIFICANT CHANGE UP
INR BLD: 1.15 RATIO — SIGNIFICANT CHANGE UP (ref 0.88–1.16)
INR BLD: 1.17 RATIO — HIGH (ref 0.88–1.16)
LACTATE BLDV-MCNC: 1.2 MMOL/L — SIGNIFICANT CHANGE UP (ref 0.7–2)
LACTATE BLDV-MCNC: 1.4 MMOL/L — SIGNIFICANT CHANGE UP (ref 0.7–2)
MAGNESIUM SERPL-MCNC: 1.7 MG/DL — SIGNIFICANT CHANGE UP (ref 1.6–2.6)
MAGNESIUM SERPL-MCNC: 2.9 MG/DL — HIGH (ref 1.6–2.6)
MCHC RBC-ENTMCNC: 30.3 PG — SIGNIFICANT CHANGE UP (ref 27–34)
MCHC RBC-ENTMCNC: 34 GM/DL — SIGNIFICANT CHANGE UP (ref 32–36)
MCV RBC AUTO: 89.4 FL — SIGNIFICANT CHANGE UP (ref 80–100)
OTHER CELLS CSF MANUAL: 7 ML/DL — LOW (ref 18–22)
PCO2 BLDV: 35 MMHG — SIGNIFICANT CHANGE UP (ref 35–50)
PCO2 BLDV: 38 MMHG — SIGNIFICANT CHANGE UP (ref 35–50)
PH BLDV: 7.45 — SIGNIFICANT CHANGE UP (ref 7.35–7.45)
PH BLDV: 7.47 — HIGH (ref 7.35–7.45)
PHOSPHATE SERPL-MCNC: 1.4 MG/DL — LOW (ref 2.5–4.5)
PHOSPHATE SERPL-MCNC: 1.7 MG/DL — LOW (ref 2.5–4.5)
PLATELET # BLD AUTO: 75 K/UL — LOW (ref 150–400)
PO2 BLDV: 27 MMHG — SIGNIFICANT CHANGE UP (ref 25–45)
PO2 BLDV: 32 MMHG — SIGNIFICANT CHANGE UP (ref 25–45)
POTASSIUM BLDV-SCNC: 3.5 MMOL/L — SIGNIFICANT CHANGE UP (ref 3.5–5.3)
POTASSIUM BLDV-SCNC: 3.7 MMOL/L — SIGNIFICANT CHANGE UP (ref 3.5–5.3)
POTASSIUM BLDV-SCNC: 4.1 MMOL/L — SIGNIFICANT CHANGE UP (ref 3.5–5.3)
POTASSIUM SERPL-MCNC: 3.1 MMOL/L — LOW (ref 3.5–5.3)
POTASSIUM SERPL-MCNC: 3.9 MMOL/L — SIGNIFICANT CHANGE UP (ref 3.5–5.3)
POTASSIUM SERPL-MCNC: 4.3 MMOL/L — SIGNIFICANT CHANGE UP (ref 3.5–5.3)
POTASSIUM SERPL-MCNC: 5.5 MMOL/L — HIGH (ref 3.5–5.3)
POTASSIUM SERPL-MCNC: 6.4 MMOL/L — CRITICAL HIGH (ref 3.5–5.3)
POTASSIUM SERPL-SCNC: 3.1 MMOL/L — LOW (ref 3.5–5.3)
POTASSIUM SERPL-SCNC: 3.9 MMOL/L — SIGNIFICANT CHANGE UP (ref 3.5–5.3)
POTASSIUM SERPL-SCNC: 4.3 MMOL/L — SIGNIFICANT CHANGE UP (ref 3.5–5.3)
POTASSIUM SERPL-SCNC: 5.5 MMOL/L — HIGH (ref 3.5–5.3)
POTASSIUM SERPL-SCNC: 6.4 MMOL/L — CRITICAL HIGH (ref 3.5–5.3)
PROT SERPL-MCNC: 5.6 G/DL — LOW (ref 6–8.3)
PROTHROM AB SERPL-ACNC: 13.3 SEC — HIGH (ref 10–12.9)
PROTHROM AB SERPL-ACNC: 13.4 SEC — HIGH (ref 10–12.9)
RBC # BLD: 3.3 M/UL — LOW (ref 3.8–5.2)
RBC # FLD: 12.6 % — SIGNIFICANT CHANGE UP (ref 10.3–14.5)
SAO2 % BLDV: 48 % — LOW (ref 67–88)
SAO2 % BLDV: 53 % — LOW (ref 67–88)
SODIUM SERPL-SCNC: 127 MMOL/L — LOW (ref 135–145)
SODIUM SERPL-SCNC: 129 MMOL/L — LOW (ref 135–145)
SODIUM SERPL-SCNC: 130 MMOL/L — LOW (ref 135–145)
TSH SERPL-MCNC: 2.89 UIU/ML — SIGNIFICANT CHANGE UP (ref 0.27–4.2)
WBC # BLD: 11.4 K/UL — HIGH (ref 3.8–10.5)
WBC # FLD AUTO: 11.4 K/UL — HIGH (ref 3.8–10.5)

## 2019-04-19 PROCEDURE — 99291 CRITICAL CARE FIRST HOUR: CPT

## 2019-04-19 PROCEDURE — 71045 X-RAY EXAM CHEST 1 VIEW: CPT | Mod: 26

## 2019-04-19 RX ORDER — MAGNESIUM SULFATE 500 MG/ML
1 VIAL (ML) INJECTION ONCE
Qty: 0 | Refills: 0 | Status: COMPLETED | OUTPATIENT
Start: 2019-04-19 | End: 2019-04-19

## 2019-04-19 RX ORDER — WARFARIN SODIUM 2.5 MG/1
5 TABLET ORAL ONCE
Qty: 0 | Refills: 0 | Status: COMPLETED | OUTPATIENT
Start: 2019-04-19 | End: 2019-04-19

## 2019-04-19 RX ORDER — MIDODRINE HYDROCHLORIDE 2.5 MG/1
5 TABLET ORAL EVERY 8 HOURS
Qty: 0 | Refills: 0 | Status: DISCONTINUED | OUTPATIENT
Start: 2019-04-19 | End: 2019-04-29

## 2019-04-19 RX ORDER — MAGNESIUM SULFATE 500 MG/ML
2 VIAL (ML) INJECTION ONCE
Qty: 0 | Refills: 0 | Status: COMPLETED | OUTPATIENT
Start: 2019-04-19 | End: 2019-04-19

## 2019-04-19 RX ORDER — AMIODARONE HYDROCHLORIDE 400 MG/1
400 TABLET ORAL EVERY 8 HOURS
Qty: 0 | Refills: 0 | Status: DISCONTINUED | OUTPATIENT
Start: 2019-04-19 | End: 2019-04-19

## 2019-04-19 RX ORDER — SPIRONOLACTONE 25 MG/1
50 TABLET, FILM COATED ORAL
Qty: 0 | Refills: 0 | Status: DISCONTINUED | OUTPATIENT
Start: 2019-04-19 | End: 2019-04-19

## 2019-04-19 RX ORDER — POTASSIUM CHLORIDE 20 MEQ
10 PACKET (EA) ORAL
Qty: 0 | Refills: 0 | Status: COMPLETED | OUTPATIENT
Start: 2019-04-19 | End: 2019-04-19

## 2019-04-19 RX ORDER — POTASSIUM CHLORIDE 20 MEQ
20 PACKET (EA) ORAL
Qty: 0 | Refills: 0 | Status: COMPLETED | OUTPATIENT
Start: 2019-04-19 | End: 2019-04-19

## 2019-04-19 RX ORDER — FUROSEMIDE 40 MG
20 TABLET ORAL
Qty: 0 | Refills: 0 | Status: DISCONTINUED | OUTPATIENT
Start: 2019-04-19 | End: 2019-04-19

## 2019-04-19 RX ORDER — AMIODARONE HYDROCHLORIDE 400 MG/1
TABLET ORAL
Qty: 0 | Refills: 0 | Status: DISCONTINUED | OUTPATIENT
Start: 2019-04-19 | End: 2019-04-19

## 2019-04-19 RX ADMIN — Medication 0.5 MILLIGRAM(S): at 17:53

## 2019-04-19 RX ADMIN — WARFARIN SODIUM 5 MILLIGRAM(S): 2.5 TABLET ORAL at 21:27

## 2019-04-19 RX ADMIN — Medication 10.01 MICROGRAM(S)/KG/MIN: at 23:38

## 2019-04-19 RX ADMIN — Medication 50 GRAM(S): at 08:32

## 2019-04-19 RX ADMIN — Medication 20 MILLIEQUIVALENT(S): at 08:33

## 2019-04-19 RX ADMIN — VASOPRESSIN 4 UNIT(S)/MIN: 20 INJECTION INTRAVENOUS at 08:31

## 2019-04-19 RX ADMIN — SODIUM CHLORIDE 10 MILLILITER(S): 9 INJECTION INTRAMUSCULAR; INTRAVENOUS; SUBCUTANEOUS at 23:39

## 2019-04-19 RX ADMIN — Medication 50 MILLIEQUIVALENT(S): at 12:36

## 2019-04-19 RX ADMIN — MIDODRINE HYDROCHLORIDE 5 MILLIGRAM(S): 2.5 TABLET ORAL at 13:17

## 2019-04-19 RX ADMIN — Medication 62.5 MILLIMOLE(S): at 06:47

## 2019-04-19 RX ADMIN — SODIUM CHLORIDE 3 MILLILITER(S): 9 INJECTION INTRAMUSCULAR; INTRAVENOUS; SUBCUTANEOUS at 05:12

## 2019-04-19 RX ADMIN — Medication 100 MILLIGRAM(S): at 05:12

## 2019-04-19 RX ADMIN — MIDODRINE HYDROCHLORIDE 5 MILLIGRAM(S): 2.5 TABLET ORAL at 21:27

## 2019-04-19 RX ADMIN — SPIRONOLACTONE 50 MILLIGRAM(S): 25 TABLET, FILM COATED ORAL at 06:24

## 2019-04-19 RX ADMIN — Medication 100 MILLIGRAM(S): at 13:17

## 2019-04-19 RX ADMIN — Medication 3 MILLILITER(S): at 17:51

## 2019-04-19 RX ADMIN — Medication 20 MILLIEQUIVALENT(S): at 04:39

## 2019-04-19 RX ADMIN — Medication 3 MILLILITER(S): at 06:40

## 2019-04-19 RX ADMIN — Medication 20 MILLIGRAM(S): at 06:12

## 2019-04-19 RX ADMIN — Medication 10.01 MICROGRAM(S)/KG/MIN: at 08:30

## 2019-04-19 RX ADMIN — Medication 100 GRAM(S): at 06:11

## 2019-04-19 RX ADMIN — VASOPRESSIN 4 UNIT(S)/MIN: 20 INJECTION INTRAVENOUS at 00:08

## 2019-04-19 RX ADMIN — Medication 100 MILLIGRAM(S): at 21:27

## 2019-04-19 RX ADMIN — Medication 3 MILLILITER(S): at 12:37

## 2019-04-19 RX ADMIN — Medication 4: at 08:33

## 2019-04-19 RX ADMIN — Medication 37.5 MILLIGRAM(S): at 21:27

## 2019-04-19 RX ADMIN — Medication 20 MILLIEQUIVALENT(S): at 06:12

## 2019-04-19 RX ADMIN — Medication 85 MILLIMOLE(S): at 18:33

## 2019-04-19 RX ADMIN — Medication 5 MILLIGRAM(S): at 21:27

## 2019-04-19 RX ADMIN — MIDODRINE HYDROCHLORIDE 5 MILLIGRAM(S): 2.5 TABLET ORAL at 08:32

## 2019-04-19 RX ADMIN — WARFARIN SODIUM 5 MILLIGRAM(S): 2.5 TABLET ORAL at 06:12

## 2019-04-19 RX ADMIN — PANTOPRAZOLE SODIUM 40 MILLIGRAM(S): 20 TABLET, DELAYED RELEASE ORAL at 08:34

## 2019-04-19 RX ADMIN — SODIUM CHLORIDE 10 MILLILITER(S): 9 INJECTION INTRAMUSCULAR; INTRAVENOUS; SUBCUTANEOUS at 08:31

## 2019-04-19 RX ADMIN — Medication 20 MILLIEQUIVALENT(S): at 02:41

## 2019-04-19 RX ADMIN — Medication 20 MILLIEQUIVALENT(S): at 00:06

## 2019-04-19 RX ADMIN — Medication 0.5 MILLIGRAM(S): at 06:39

## 2019-04-19 RX ADMIN — Medication 50 MILLIEQUIVALENT(S): at 13:17

## 2019-04-19 RX ADMIN — Medication 81 MILLIGRAM(S): at 12:36

## 2019-04-19 RX ADMIN — Medication 3 MILLILITER(S): at 01:08

## 2019-04-19 RX ADMIN — ATORVASTATIN CALCIUM 20 MILLIGRAM(S): 80 TABLET, FILM COATED ORAL at 21:27

## 2019-04-19 NOTE — PROGRESS NOTE ADULT - SUBJECTIVE AND OBJECTIVE BOX
SHAW BURKS  MRN-[id number]  Patient is a 69y old  Female who presents with a chief complaint of MVR S/P MVR-KASIE ligagtion-Maze    HPI:  70 yo F with h/o HLD, MVP/MR, allergy induced Asthma (controlled) had f/u cardiology evaluation. ECHO revealed moderate to severe MR . Pt had cardiac cath /surgical consult- scheduled for mitral valve repair, possible replacement, radio frequency, Maze procedure, left atrial appendage closure on 2019. Pt denies any chest pain/SOB/syncope. (10 Apr 2019 10:14)      Surgery/Hospital course:  4/15 MVR(T)/ KASIE/ Maze  on / vaso drips  AV paced for junctional rhythm bradycardia and hypotension    Physical Exam:  ICU Vital Signs Last 24 Hrs  T(C): 37.4 (2019 08:00), Max: 37.9 (2019 20:00)  T(F): 99.3 (2019 08:00), Max: 100.2 (2019 20:00)  HR: 86 (2019 11:30) (86 - 102)  BP: 102/54 (2019 11:30) (91/52 - 137/63)  BP(mean): 76 (2019 11:30) (64 - 99)  ABP: --  ABP(mean): --  RR: 25 (2019 11:30) (19 - 48)  SpO2: 93% (2019 11:30) (92% - 98%)    Gen:  Awake, alert,   CNS: non focal .	  Neck: no JVD  RES : clear , no wheezing      ; chest tubes                     CVS: paced   rhythm. Normal S1/S2  Abd: Soft, non-distended. Bowel sounds present.  Skin: No rash.  Ext:  no edema, A Line      ============================I/O===========================  I&O's Detail    2019 07:01  -  2019 07:00  --------------------------------------------------------  IN:    Albumin 5%  - 250 mL: 750 mL    DOBUTamine Infusion: 120 mL    DOBUTamine Infusion: 50 mL    IV PiggyBack: 162.5 mL    Oral Fluid: 100 mL    sodium chloride 0.9%.: 240 mL    vasopressin Infusion: 27.2 mL  Total IN: 1449.7 mL    OUT:    Bulb: 75 mL    Indwelling Catheter - Urethral: 3335 mL  Total OUT: 3410 mL    Total NET: -1960.3 mL      2019 07:01  -  2019 11:47  --------------------------------------------------------  IN:    DOBUTamine Infusion: 40 mL    IV PiggyBack: 237.5 mL    Oral Fluid: 180 mL    sodium chloride 0.9%.: 40 mL    vasopressin Infusion: 1 mL  Total IN: 498.5 mL    OUT:    Indwelling Catheter - Urethral: 560 mL  Total OUT: 560 mL    Total NET: -61.5 mL          ============================ LABS =========================                        10.0   11.4  )-----------( 75       ( 2019 00:52 )             29.5       129<L>  |  94<L>  |  10  ----------------------------<  140<H>  3.1<L>   |  22  |  0.43<L>    Ca    8.3<L>      2019 00:52  Phos  1.7       Mg     1.7         TPro  5.6<L>  /  Alb  3.8  /  TBili  1.6<H>  /  DBili  x   /  AST  57<H>  /  ALT  30  /  AlkPhos  69    ABG - ( 2019 05:06 )  pH, Arterial: 7.47  pH, Blood: x     /  pCO2: 31    /  pO2: 94    / HCO3: 22    / Base Excess: -.4   /  SaO2: 98        ======================Micro/Rad/Cardio=================  CXR: Reviewed  Echo:Reviewed  ======================================================  PAST MEDICAL & SURGICAL HISTORY:  Allergy-induced asthma, mild intermittent, uncomplicated: Denies any exacerbation or ED admission  Osteopenia  MVP (mitral valve prolapse)  Trigger finger  Hyperlipidemia  H/O laminectomy: 2017  Cataract: 10 yrs ago  Breast fibroadenoma in female, left:     ====================ASSESMENT ==============  4/15 MVR(T)/ KASIE/ Maze  acute systolic CHF R sided  bradycardia ( junctional rhythm)/paced   Hypotension  asthma  Hyperlipidemia    Plan:  ====================== NEUROLOGY=====================  oxyCODONE    5 mG/acetaminophen 325 mG 1 Tablet(s) Oral PRN  oxyCODONE    5 mG/acetaminophen 325 mG 2 Tablet(s) Oral PRN  venlafaxine XR. 37.5 milliGRAM(s) Oral    ==================== RESPIRATORY======================  ALBUTerol    90 MICROgram(s) HFA Inhaler 1 Puff(s) Inhalation every 4 hours  ALBUTerol/ipratropium for Nebulization 3 milliLiter(s) Nebulizer every 6 hours  tiotropium 18 MICROgram(s) Capsule 1 Capsule(s) Inhalation daily    ====================CARDIOVASCULAR==================  DOBUTamine Infusion 2.5 MICROgram(s)/kG/Min IV Continuous <Continuous>  echo done decrease rv fx  IV Vasopressin drip  ===================HEMATOLOGIC/ONC ===================  aspirin  chewable 81 milliGRAM(s) Oral daily  enoxaparin Injectable 40 milliGRAM(s) SubCutaneous daily  warfarin 2 milliGRAM(s) Oral once    ===================== RENAL =========================    ==================== GASTROINTESTINAL===================  docusate sodium 100 milliGRAM(s) Oral three times a day  pantoprazole    Tablet 40 milliGRAM(s) Oral before breakfast  potassium chloride  10 mEq/50 mL IVPB 10 milliEquivalent(s) IV Intermittent every 1 hour  potassium chloride  10 mEq/50 mL IVPB 10 milliEquivalent(s) IV Intermittent every 1 hour  potassium chloride  10 mEq/50 mL IVPB 10 milliEquivalent(s) IV Intermittent every 1 hour  sodium chloride 0.9% lock flush 3 milliLiter(s) IV Push every 8 hours  sodium chloride 0.9%. 1000 milliLiter(s) IV Continuous <Continuous>  sodium phosphate IVPB 15 milliMole(s) IV Intermittent once    =======================    ENDOCRINE  =====================  atorvastatin 20  dextrose 50% Injectable 50  dextrose 50% Injectable 25  insulin lispro (HumaLOG) corrective regimen sliding scale   insulin lispro (HumaLOG) corrective regimen sliding scale       Patient requires continuous monitoring with bedside rhythm monitoring, arterial line,, pulse oximetry, ;intermittent blood gas analysis.  Care plan discussed with ICU care team.  patient remain critical; required more than usual post op care; I have spent 30 minutes providing non routine post op care.

## 2019-04-20 LAB
ALBUMIN SERPL ELPH-MCNC: 3.5 G/DL — SIGNIFICANT CHANGE UP (ref 3.3–5)
ALBUMIN SERPL ELPH-MCNC: 4 G/DL — SIGNIFICANT CHANGE UP (ref 3.3–5)
ALP SERPL-CCNC: 104 U/L — SIGNIFICANT CHANGE UP (ref 40–120)
ALP SERPL-CCNC: 89 U/L — SIGNIFICANT CHANGE UP (ref 40–120)
ALT FLD-CCNC: 27 U/L — SIGNIFICANT CHANGE UP (ref 10–45)
ALT FLD-CCNC: 30 U/L — SIGNIFICANT CHANGE UP (ref 10–45)
ANION GAP SERPL CALC-SCNC: 11 MMOL/L — SIGNIFICANT CHANGE UP (ref 5–17)
ANION GAP SERPL CALC-SCNC: 12 MMOL/L — SIGNIFICANT CHANGE UP (ref 5–17)
AST SERPL-CCNC: 33 U/L — SIGNIFICANT CHANGE UP (ref 10–40)
AST SERPL-CCNC: 38 U/L — SIGNIFICANT CHANGE UP (ref 10–40)
BILIRUB SERPL-MCNC: 0.8 MG/DL — SIGNIFICANT CHANGE UP (ref 0.2–1.2)
BILIRUB SERPL-MCNC: 0.9 MG/DL — SIGNIFICANT CHANGE UP (ref 0.2–1.2)
BUN SERPL-MCNC: 9 MG/DL — SIGNIFICANT CHANGE UP (ref 7–23)
BUN SERPL-MCNC: 9 MG/DL — SIGNIFICANT CHANGE UP (ref 7–23)
CALCIUM SERPL-MCNC: 8.6 MG/DL — SIGNIFICANT CHANGE UP (ref 8.4–10.5)
CALCIUM SERPL-MCNC: 9.1 MG/DL — SIGNIFICANT CHANGE UP (ref 8.4–10.5)
CHLORIDE SERPL-SCNC: 104 MMOL/L — SIGNIFICANT CHANGE UP (ref 96–108)
CHLORIDE SERPL-SCNC: 106 MMOL/L — SIGNIFICANT CHANGE UP (ref 96–108)
CO2 SERPL-SCNC: 22 MMOL/L — SIGNIFICANT CHANGE UP (ref 22–31)
CO2 SERPL-SCNC: 23 MMOL/L — SIGNIFICANT CHANGE UP (ref 22–31)
CREAT SERPL-MCNC: 0.45 MG/DL — LOW (ref 0.5–1.3)
CREAT SERPL-MCNC: 0.47 MG/DL — LOW (ref 0.5–1.3)
GLUCOSE SERPL-MCNC: 123 MG/DL — HIGH (ref 70–99)
GLUCOSE SERPL-MCNC: 147 MG/DL — HIGH (ref 70–99)
HCT VFR BLD CALC: 28.3 % — LOW (ref 34.5–45)
HGB BLD-MCNC: 9.8 G/DL — LOW (ref 11.5–15.5)
INR BLD: 1.49 RATIO — HIGH (ref 0.88–1.16)
MAGNESIUM SERPL-MCNC: 2.2 MG/DL — SIGNIFICANT CHANGE UP (ref 1.6–2.6)
MAGNESIUM SERPL-MCNC: 2.5 MG/DL — SIGNIFICANT CHANGE UP (ref 1.6–2.6)
MCHC RBC-ENTMCNC: 30.6 PG — SIGNIFICANT CHANGE UP (ref 27–34)
MCHC RBC-ENTMCNC: 34.7 GM/DL — SIGNIFICANT CHANGE UP (ref 32–36)
MCV RBC AUTO: 88.1 FL — SIGNIFICANT CHANGE UP (ref 80–100)
PHOSPHATE SERPL-MCNC: 1.9 MG/DL — LOW (ref 2.5–4.5)
PHOSPHATE SERPL-MCNC: 2.9 MG/DL — SIGNIFICANT CHANGE UP (ref 2.5–4.5)
PLATELET # BLD AUTO: 137 K/UL — LOW (ref 150–400)
POTASSIUM SERPL-MCNC: 3.8 MMOL/L — SIGNIFICANT CHANGE UP (ref 3.5–5.3)
POTASSIUM SERPL-MCNC: 3.9 MMOL/L — SIGNIFICANT CHANGE UP (ref 3.5–5.3)
POTASSIUM SERPL-SCNC: 3.8 MMOL/L — SIGNIFICANT CHANGE UP (ref 3.5–5.3)
POTASSIUM SERPL-SCNC: 3.9 MMOL/L — SIGNIFICANT CHANGE UP (ref 3.5–5.3)
PROT SERPL-MCNC: 5.7 G/DL — LOW (ref 6–8.3)
PROT SERPL-MCNC: 6.1 G/DL — SIGNIFICANT CHANGE UP (ref 6–8.3)
PROTHROM AB SERPL-ACNC: 17.3 SEC — HIGH (ref 10–12.9)
RBC # BLD: 3.22 M/UL — LOW (ref 3.8–5.2)
RBC # FLD: 12.1 % — SIGNIFICANT CHANGE UP (ref 10.3–14.5)
SODIUM SERPL-SCNC: 139 MMOL/L — SIGNIFICANT CHANGE UP (ref 135–145)
SODIUM SERPL-SCNC: 139 MMOL/L — SIGNIFICANT CHANGE UP (ref 135–145)
WBC # BLD: 8.8 K/UL — SIGNIFICANT CHANGE UP (ref 3.8–10.5)
WBC # FLD AUTO: 8.8 K/UL — SIGNIFICANT CHANGE UP (ref 3.8–10.5)

## 2019-04-20 PROCEDURE — 99233 SBSQ HOSP IP/OBS HIGH 50: CPT

## 2019-04-20 PROCEDURE — 93010 ELECTROCARDIOGRAM REPORT: CPT

## 2019-04-20 PROCEDURE — 71045 X-RAY EXAM CHEST 1 VIEW: CPT | Mod: 26

## 2019-04-20 RX ORDER — ALBUMIN HUMAN 25 %
250 VIAL (ML) INTRAVENOUS ONCE
Qty: 0 | Refills: 0 | Status: COMPLETED | OUTPATIENT
Start: 2019-04-20 | End: 2019-04-20

## 2019-04-20 RX ORDER — POTASSIUM CHLORIDE 20 MEQ
20 PACKET (EA) ORAL ONCE
Qty: 0 | Refills: 0 | Status: COMPLETED | OUTPATIENT
Start: 2019-04-20 | End: 2019-04-20

## 2019-04-20 RX ORDER — WARFARIN SODIUM 2.5 MG/1
2.5 TABLET ORAL ONCE
Qty: 0 | Refills: 0 | Status: COMPLETED | OUTPATIENT
Start: 2019-04-20 | End: 2019-04-20

## 2019-04-20 RX ADMIN — Medication 20 MILLIEQUIVALENT(S): at 12:32

## 2019-04-20 RX ADMIN — Medication 100 MILLIGRAM(S): at 12:32

## 2019-04-20 RX ADMIN — Medication 100 MILLIGRAM(S): at 05:17

## 2019-04-20 RX ADMIN — MIDODRINE HYDROCHLORIDE 5 MILLIGRAM(S): 2.5 TABLET ORAL at 05:17

## 2019-04-20 RX ADMIN — MIDODRINE HYDROCHLORIDE 5 MILLIGRAM(S): 2.5 TABLET ORAL at 21:34

## 2019-04-20 RX ADMIN — Medication 5 MILLIGRAM(S): at 21:33

## 2019-04-20 RX ADMIN — Medication 81 MILLIGRAM(S): at 12:32

## 2019-04-20 RX ADMIN — Medication 2: at 13:20

## 2019-04-20 RX ADMIN — Medication 20 MILLIEQUIVALENT(S): at 15:55

## 2019-04-20 RX ADMIN — Medication 125 MILLILITER(S): at 11:28

## 2019-04-20 RX ADMIN — Medication 0.5 MILLIGRAM(S): at 17:40

## 2019-04-20 RX ADMIN — Medication 0.5 MILLIGRAM(S): at 05:47

## 2019-04-20 RX ADMIN — Medication 37.5 MILLIGRAM(S): at 21:33

## 2019-04-20 RX ADMIN — Medication 3 MILLILITER(S): at 11:30

## 2019-04-20 RX ADMIN — Medication 3 MILLILITER(S): at 17:37

## 2019-04-20 RX ADMIN — Medication 62.5 MILLIMOLE(S): at 18:41

## 2019-04-20 RX ADMIN — PANTOPRAZOLE SODIUM 40 MILLIGRAM(S): 20 TABLET, DELAYED RELEASE ORAL at 08:45

## 2019-04-20 RX ADMIN — Medication 2: at 18:23

## 2019-04-20 RX ADMIN — WARFARIN SODIUM 2.5 MILLIGRAM(S): 2.5 TABLET ORAL at 15:55

## 2019-04-20 RX ADMIN — Medication 3 MILLILITER(S): at 05:41

## 2019-04-20 RX ADMIN — ATORVASTATIN CALCIUM 20 MILLIGRAM(S): 80 TABLET, FILM COATED ORAL at 21:33

## 2019-04-20 RX ADMIN — MIDODRINE HYDROCHLORIDE 5 MILLIGRAM(S): 2.5 TABLET ORAL at 14:00

## 2019-04-20 RX ADMIN — Medication 100 MILLIGRAM(S): at 21:34

## 2019-04-20 NOTE — PROGRESS NOTE ADULT - SUBJECTIVE AND OBJECTIVE BOX
SHAW BURKS  [id number]  Patient is a 69y old  Female who presents with a chief complaint of MVR S/P MVR-KASIE ligagtion-Maze    HPI:  68 yo F with h/o HLD, MVP/MR, allergy induced Asthma (controlled) had f/u cardiology evaluation. ECHO revealed moderate to severe MR . Pt had cardiac cath /surgical consult- scheduled for mitral valve repair, possible replacement, radio frequency, Maze procedure, left atrial appendage closure on 2019. Pt denies any chest pain/SOB/syncope. (10 Apr 2019 10:14)      Surgery/Hospital course:  4/15 MVR(T)/ KASIE/ Maze  on / vaso drips  AV paced for junctional rhythm bradycardia and hypotension    Physical Exam:  ICU Vital Signs Last 24 Hrs  T(C): 37.3 (2019 08:00), Max: 37.6 (2019 20:00)  T(F): 99.1 (2019 08:00), Max: 99.7 (2019 20:00)  HR: 89 (2019 08:00) (76 - 91)  BP: 97/48 (2019 08:00) (79/42 - 118/59)  BP(mean): 69 (2019 08:00) (55 - 83)  ABP: --  ABP(mean): --  RR: 26 (2019 08:00) (16 - 45)  SpO2: 96% (2019 08:00) (80% - 100%)      Gen:  Awake, alert,   CNS: non focal .	  Neck: no JVD  RES : clear , no wheezing      ; chest tubes                     CVS: paced   rhythm. Normal S1/S2  Abd: Soft, non-distended. Bowel sounds present.  Skin: No rash.  Ext:  no edema, A Line      ============================I/O===========================  I&O's Detail    2019 07:01  -  2019 07:00  --------------------------------------------------------  IN:    DOBUTamine Infusion: 240 mL    IV PiggyBack: 847.5 mL    Oral Fluid: 537 mL    sodium chloride 0.9%.: 240 mL    vasopressin Infusion: 1 mL  Total IN: 1865.5 mL    OUT:    Indwelling Catheter - Urethral: 4800 mL  Total OUT: 4800 mL    Total NET: -2934.5 mL      2019 07:01  -  2019 08:43  --------------------------------------------------------  IN:    DOBUTamine Infusion: 10 mL    sodium chloride 0.9%.: 10 mL  Total IN: 20 mL    OUT:    Indwelling Catheter - Urethral: 50 mL  Total OUT: 50 mL    Total NET: -30 mL        =========================== LABS =========================                  9.8    8.8   )-----------( 137      ( 2019 01:30 )             28.3   -    139  |  106  |  9   ----------------------------<  123<H>  3.9   |  22  |  0.45<L>    Ca    8.6      2019 01:29  Phos  2.9       Mg     2.5         TPro  5.7<L>  /  Alb  3.5  /  TBili  0.9  /  DBili  x   /  AST  38  /  ALT  27  /  AlkPhos  89      ======================Micro/Rad/Cardio=================  CXR: Reviewed  Echo:Reviewed  ======================================================  PAST MEDICAL & SURGICAL HISTORY:  Allergy-induced asthma, mild intermittent, uncomplicated: Denies any exacerbation or ED admission  Osteopenia  MVP (mitral valve prolapse)  Trigger finger  Hyperlipidemia  H/O laminectomy:   Cataract: 10 yrs ago  Breast fibroadenoma in female, left: 1988    ====================ASSESMENT ==============  15 MVR(T)/ KASIE/ Maze  acute systolic CHF R sided  bradycardia ( junctional rhythm)/paced   Hypotension  asthma  Hyperlipidemia    Plan:  ====================== NEUROLOGY=====================  oxyCODONE    5 mG/acetaminophen 325 mG 1 Tablet(s) Oral PRN  oxyCODONE    5 mG/acetaminophen 325 mG 2 Tablet(s) Oral PRN  venlafaxine XR. 37.5 milliGRAM(s) Oral    ==================== RESPIRATORY======================  ALBUTerol    90 MICROgram(s) HFA Inhaler 1 Puff(s) Inhalation every 4 hours  ALBUTerol/ipratropium for Nebulization 3 milliLiter(s) Nebulizer every 6 hours  tiotropium 18 MICROgram(s) Capsule 1 Capsule(s) Inhalation daily    ====================CARDIOVASCULAR==================  DOBUTamine Infusion 2.5 MICROgram(s)/kG/Min IV Continuous <Continuous>  echo done decrease rv fx  IV Vasopressin drip off  ===================HEMATOLOGIC/ONC ===================  aspirin  chewable 81 milliGRAM(s) Oral daily  enoxaparin Injectable 40 milliGRAM(s) SubCutaneous daily  warfarin 2 milliGRAM(s) Oral once    ===================== RENAL =========================    ==================== GASTROINTESTINAL===================  docusate sodium 100 milliGRAM(s) Oral three times a day  pantoprazole    Tablet 40 milliGRAM(s) Oral before breakfast    =======================    ENDOCRINE  =====================  atorvastatin 20  insulin lispro (HumaLOG) corrective regimen sliding scale   insulin lispro (HumaLOG) corrective regimen sliding scale     Patient requires continuous monitoring with bedside rhythm monitoring, arterial line,, pulse oximetry, ;intermittent blood gas analysis.  Care plan discussed with ICU care team.

## 2019-04-21 LAB
ALBUMIN SERPL ELPH-MCNC: 3.1 G/DL — LOW (ref 3.3–5)
ALP SERPL-CCNC: 97 U/L — SIGNIFICANT CHANGE UP (ref 40–120)
ALT FLD-CCNC: 28 U/L — SIGNIFICANT CHANGE UP (ref 10–45)
ANION GAP SERPL CALC-SCNC: 8 MMOL/L — SIGNIFICANT CHANGE UP (ref 5–17)
APTT BLD: 25.5 SEC — LOW (ref 27.5–36.3)
AST SERPL-CCNC: 28 U/L — SIGNIFICANT CHANGE UP (ref 10–40)
BILIRUB SERPL-MCNC: 0.8 MG/DL — SIGNIFICANT CHANGE UP (ref 0.2–1.2)
BUN SERPL-MCNC: 6 MG/DL — LOW (ref 7–23)
CALCIUM SERPL-MCNC: 8.6 MG/DL — SIGNIFICANT CHANGE UP (ref 8.4–10.5)
CHLORIDE SERPL-SCNC: 108 MMOL/L — SIGNIFICANT CHANGE UP (ref 96–108)
CO2 SERPL-SCNC: 20 MMOL/L — LOW (ref 22–31)
CREAT SERPL-MCNC: 0.37 MG/DL — LOW (ref 0.5–1.3)
GLUCOSE SERPL-MCNC: 109 MG/DL — HIGH (ref 70–99)
HCT VFR BLD CALC: 25.9 % — LOW (ref 34.5–45)
HGB BLD-MCNC: 8.8 G/DL — LOW (ref 11.5–15.5)
INR BLD: 1.46 RATIO — HIGH (ref 0.88–1.16)
MAGNESIUM SERPL-MCNC: 1.8 MG/DL — SIGNIFICANT CHANGE UP (ref 1.6–2.6)
MCHC RBC-ENTMCNC: 30.9 PG — SIGNIFICANT CHANGE UP (ref 27–34)
MCHC RBC-ENTMCNC: 34 GM/DL — SIGNIFICANT CHANGE UP (ref 32–36)
MCV RBC AUTO: 91.1 FL — SIGNIFICANT CHANGE UP (ref 80–100)
PHOSPHATE SERPL-MCNC: 2.6 MG/DL — SIGNIFICANT CHANGE UP (ref 2.5–4.5)
PLATELET # BLD AUTO: 148 K/UL — LOW (ref 150–400)
POTASSIUM SERPL-MCNC: 4.4 MMOL/L — SIGNIFICANT CHANGE UP (ref 3.5–5.3)
POTASSIUM SERPL-SCNC: 4.4 MMOL/L — SIGNIFICANT CHANGE UP (ref 3.5–5.3)
PROT SERPL-MCNC: 5.4 G/DL — LOW (ref 6–8.3)
PROTHROM AB SERPL-ACNC: 17 SEC — HIGH (ref 10–12.9)
RBC # BLD: 2.85 M/UL — LOW (ref 3.8–5.2)
RBC # FLD: 13 % — SIGNIFICANT CHANGE UP (ref 10.3–14.5)
SODIUM SERPL-SCNC: 136 MMOL/L — SIGNIFICANT CHANGE UP (ref 135–145)
WBC # BLD: 7.2 K/UL — SIGNIFICANT CHANGE UP (ref 3.8–10.5)
WBC # FLD AUTO: 7.2 K/UL — SIGNIFICANT CHANGE UP (ref 3.8–10.5)

## 2019-04-21 PROCEDURE — 71045 X-RAY EXAM CHEST 1 VIEW: CPT | Mod: 26

## 2019-04-21 PROCEDURE — 99233 SBSQ HOSP IP/OBS HIGH 50: CPT

## 2019-04-21 RX ORDER — WARFARIN SODIUM 2.5 MG/1
5 TABLET ORAL ONCE
Qty: 0 | Refills: 0 | Status: COMPLETED | OUTPATIENT
Start: 2019-04-21 | End: 2019-04-21

## 2019-04-21 RX ADMIN — WARFARIN SODIUM 5 MILLIGRAM(S): 2.5 TABLET ORAL at 08:18

## 2019-04-21 RX ADMIN — Medication 3 MILLILITER(S): at 05:59

## 2019-04-21 RX ADMIN — Medication 0.5 MILLIGRAM(S): at 17:23

## 2019-04-21 RX ADMIN — Medication 100 MILLIGRAM(S): at 05:31

## 2019-04-21 RX ADMIN — Medication 5 MILLIGRAM(S): at 21:35

## 2019-04-21 RX ADMIN — Medication 0.5 MILLIGRAM(S): at 05:59

## 2019-04-21 RX ADMIN — Medication 3 MILLILITER(S): at 17:21

## 2019-04-21 RX ADMIN — Medication 5 MICROGRAM(S)/KG/MIN: at 07:19

## 2019-04-21 RX ADMIN — Medication 37.5 MILLIGRAM(S): at 21:35

## 2019-04-21 RX ADMIN — MIDODRINE HYDROCHLORIDE 5 MILLIGRAM(S): 2.5 TABLET ORAL at 05:31

## 2019-04-21 RX ADMIN — Medication 3 MILLILITER(S): at 11:36

## 2019-04-21 RX ADMIN — ATORVASTATIN CALCIUM 20 MILLIGRAM(S): 80 TABLET, FILM COATED ORAL at 21:35

## 2019-04-21 RX ADMIN — PANTOPRAZOLE SODIUM 40 MILLIGRAM(S): 20 TABLET, DELAYED RELEASE ORAL at 08:18

## 2019-04-21 RX ADMIN — Medication 81 MILLIGRAM(S): at 12:07

## 2019-04-21 RX ADMIN — MIDODRINE HYDROCHLORIDE 5 MILLIGRAM(S): 2.5 TABLET ORAL at 21:35

## 2019-04-21 NOTE — PROGRESS NOTE ADULT - SUBJECTIVE AND OBJECTIVE BOX
SHAW BURKS  MRN-[id number]  Patient is a 69y old  Female who is S/P MVR-KASIE ligation-maze.  No specific complaints at this time.    HPI:  70 yo F with h/o HLD, MVP/MR, allergy induced Asthma (controlled) had f/u cardiology evaluation. ECHO revealed moderate to severe MR . Pt had cardiac cath /surgical consult- scheduled for mitral valve repair, possible replacement, radio frequency, Maze procedure, left atrial appendage closure on 2019. Pt denies any chest pain/SOB/syncope. (10 Apr 2019 10:14)      Surgery/Hospital course:  4/15 MVR(T)/ KASIE/ Maze  on / vaso drips  AV paced for junctional rhythm bradycardia and hypotension    Physical Exam:  ICU Vital Signs Last 24 Hrs  T(C): 37.3 (2019 08:00), Max: 37.6 (2019 20:00)  T(F): 99.1 (2019 08:00), Max: 99.7 (2019 20:00)  HR: 89 (2019 08:00) (76 - 91)  BP: 97/48 (2019 08:00) (79/42 - 118/59)  BP(mean): 69 (2019 08:00) (55 - 83)  ABP: --  ABP(mean): --  RR: 26 (2019 08:00) (16 - 45)  SpO2: 96% (2019 08:00) (80% - 100%)      Gen:  Awake, alert,   CNS: non focal .	  Neck: no JVD  RES : clear , no wheezing      ; chest tubes                     CVS: paced   rhythm. Normal S1/S2  Abd: Soft, non-distended. Bowel sounds present.  Skin: No rash.  Ext:  no edema, A Line    ============================I/O===========================  I&O's Detail    2019 07:01  -  2019 07:00  --------------------------------------------------------  IN:    Albumin 5%  - 250 mL: 250 mL    DOBUTamine Infusion: 20 mL    DOBUTamine Infusion: 15 mL    DOBUTamine Infusion: 150 mL    DOBUTamine Infusion: 20 mL    IV PiggyBack: 312.5 mL    Oral Fluid: 1000 mL    sodium chloride 0.9%: 100 mL  Total IN: 1867.5 mL    OUT:    Indwelling Catheter - Urethral: 3035 mL  Total OUT: 3035 mL    Total NET: -1167.5 mL      2019 07:01  -  2019 08:54  --------------------------------------------------------  IN:    DOBUTamine Infusion: 5 mL  Total IN: 5 mL    OUT:    Indwelling Catheter - Urethral: 250 mL  Total OUT: 250 mL    Total NET: -245 mL      =========================== LABS =========================                             8.8    7.2   )-----------( 148      ( 2019 05:26 )             25.9       136  |  108  |  6<L>  ----------------------------<  109<H>  4.4   |  20<L>  |  0.37<L>    Ca    8.6      2019 05:26  Phos  2.6       Mg     1.8         TPro  5.4<L>  /  Alb  3.1<L>  /  TBili  0.8  /  DBili  x   /  AST  28  /  ALT  28  /  AlkPhos  97        ======================Micro/Rad/Cardio=================  CXR: Reviewed  Echo:Reviewed  ======================================================  PAST MEDICAL & SURGICAL HISTORY:  Allergy-induced asthma, mild intermittent, uncomplicated: Denies any exacerbation or ED admission  Osteopenia  MVP (mitral valve prolapse)  Trigger finger  Hyperlipidemia  H/O laminectomy:   Cataract: 10 yrs ago  Breast fibroadenoma in female, left: 1988    ====================ASSESMENT ==============  15 MVR(T)/ KASIE/ Maze  acute systolic CHF R sided  bradycardia ( junctional rhythm)/paced   Hypotension  asthma  Hyperlipidemia    Plan:  ==================== RESPIRATORY======================  ALBUTerol    90 MICROgram(s) HFA Inhaler 1 Puff(s) Inhalation every 4 hours  ALBUTerol/ipratropium for Nebulization 3 milliLiter(s) Nebulizer every 6 hours  tiotropium 18 MICROgram(s) Capsule 1 Capsule(s) Inhalation daily    ====================CARDIOVASCULAR==================  DOBUTamine Infusion 2.5 MICROgram(s)/kG/Min IV Continuous <Continuous>  echo done decrease rv fx    ===================HEMATOLOGIC/ONC ===================  aspirin  chewable 81 milliGRAM(s) Oral daily  enoxaparin Injectable 40 milliGRAM(s) SubCutaneous daily  warfarin loading dose    ===================== RENAL =========================    ==================== GASTROINTESTINAL===================  docusate sodium 100 milliGRAM(s) Oral three times a day  pantoprazole    Tablet 40 milliGRAM(s) Oral before breakfast    =======================    ENDOCRINE  =====================  atorvastatin 20  insulin lispro (HumaLOG) corrective regimen sliding scale   insulin lispro (HumaLOG) corrective regimen sliding scale     Patient requires continuous monitoring with bedside rhythm monitoring, arterial line,, pulse oximetry.  Care plan discussed with ICU care team.

## 2019-04-22 DIAGNOSIS — N39.0 URINARY TRACT INFECTION, SITE NOT SPECIFIED: ICD-10-CM

## 2019-04-22 LAB
ALBUMIN SERPL ELPH-MCNC: 4 G/DL — SIGNIFICANT CHANGE UP (ref 3.3–5)
ALP SERPL-CCNC: 124 U/L — HIGH (ref 40–120)
ALT FLD-CCNC: 28 U/L — SIGNIFICANT CHANGE UP (ref 10–45)
ANION GAP SERPL CALC-SCNC: 12 MMOL/L — SIGNIFICANT CHANGE UP (ref 5–17)
APPEARANCE UR: CLEAR — SIGNIFICANT CHANGE UP
APTT BLD: 28.3 SEC — SIGNIFICANT CHANGE UP (ref 27.5–36.3)
AST SERPL-CCNC: 23 U/L — SIGNIFICANT CHANGE UP (ref 10–40)
BILIRUB SERPL-MCNC: 0.8 MG/DL — SIGNIFICANT CHANGE UP (ref 0.2–1.2)
BILIRUB UR-MCNC: NEGATIVE — SIGNIFICANT CHANGE UP
BUN SERPL-MCNC: 8 MG/DL — SIGNIFICANT CHANGE UP (ref 7–23)
CALCIUM SERPL-MCNC: 9.8 MG/DL — SIGNIFICANT CHANGE UP (ref 8.4–10.5)
CHLORIDE SERPL-SCNC: 102 MMOL/L — SIGNIFICANT CHANGE UP (ref 96–108)
CO2 SERPL-SCNC: 21 MMOL/L — LOW (ref 22–31)
COLOR SPEC: SIGNIFICANT CHANGE UP
CREAT SERPL-MCNC: 0.43 MG/DL — LOW (ref 0.5–1.3)
DIFF PNL FLD: ABNORMAL
GLUCOSE SERPL-MCNC: 126 MG/DL — HIGH (ref 70–99)
GLUCOSE UR QL: NEGATIVE — SIGNIFICANT CHANGE UP
HCT VFR BLD CALC: 30.1 % — LOW (ref 34.5–45)
HGB BLD-MCNC: 10.4 G/DL — LOW (ref 11.5–15.5)
INR BLD: 1.36 RATIO — HIGH (ref 0.88–1.16)
KETONES UR-MCNC: NEGATIVE — SIGNIFICANT CHANGE UP
LEUKOCYTE ESTERASE UR-ACNC: ABNORMAL
MAGNESIUM SERPL-MCNC: 1.7 MG/DL — SIGNIFICANT CHANGE UP (ref 1.6–2.6)
MCHC RBC-ENTMCNC: 31.4 PG — SIGNIFICANT CHANGE UP (ref 27–34)
MCHC RBC-ENTMCNC: 34.5 GM/DL — SIGNIFICANT CHANGE UP (ref 32–36)
MCV RBC AUTO: 91.2 FL — SIGNIFICANT CHANGE UP (ref 80–100)
NITRITE UR-MCNC: POSITIVE
PH UR: 7.5 — SIGNIFICANT CHANGE UP (ref 5–8)
PHOSPHATE SERPL-MCNC: 3.7 MG/DL — SIGNIFICANT CHANGE UP (ref 2.5–4.5)
PLATELET # BLD AUTO: 219 K/UL — SIGNIFICANT CHANGE UP (ref 150–400)
POTASSIUM SERPL-MCNC: 4.5 MMOL/L — SIGNIFICANT CHANGE UP (ref 3.5–5.3)
POTASSIUM SERPL-SCNC: 4.5 MMOL/L — SIGNIFICANT CHANGE UP (ref 3.5–5.3)
PROT SERPL-MCNC: 6.6 G/DL — SIGNIFICANT CHANGE UP (ref 6–8.3)
PROT UR-MCNC: ABNORMAL
PROTHROM AB SERPL-ACNC: 15.7 SEC — HIGH (ref 10–12.9)
RBC # BLD: 3.3 M/UL — LOW (ref 3.8–5.2)
RBC # FLD: 13.1 % — SIGNIFICANT CHANGE UP (ref 10.3–14.5)
SODIUM SERPL-SCNC: 135 MMOL/L — SIGNIFICANT CHANGE UP (ref 135–145)
SP GR SPEC: 1.01 — SIGNIFICANT CHANGE UP (ref 1.01–1.02)
UROBILINOGEN FLD QL: NEGATIVE — SIGNIFICANT CHANGE UP
WBC # BLD: 12.1 K/UL — HIGH (ref 3.8–10.5)
WBC # FLD AUTO: 12.1 K/UL — HIGH (ref 3.8–10.5)

## 2019-04-22 PROCEDURE — 93010 ELECTROCARDIOGRAM REPORT: CPT

## 2019-04-22 PROCEDURE — 99233 SBSQ HOSP IP/OBS HIGH 50: CPT

## 2019-04-22 PROCEDURE — 71045 X-RAY EXAM CHEST 1 VIEW: CPT | Mod: 26

## 2019-04-22 RX ORDER — ENOXAPARIN SODIUM 100 MG/ML
40 INJECTION SUBCUTANEOUS DAILY
Qty: 0 | Refills: 0 | Status: DISCONTINUED | OUTPATIENT
Start: 2019-04-22 | End: 2019-04-29

## 2019-04-22 RX ORDER — CEFTRIAXONE 500 MG/1
1 INJECTION, POWDER, FOR SOLUTION INTRAMUSCULAR; INTRAVENOUS ONCE
Qty: 0 | Refills: 0 | Status: COMPLETED | OUTPATIENT
Start: 2019-04-22 | End: 2019-04-22

## 2019-04-22 RX ORDER — WARFARIN SODIUM 2.5 MG/1
2.5 TABLET ORAL ONCE
Qty: 0 | Refills: 0 | Status: COMPLETED | OUTPATIENT
Start: 2019-04-22 | End: 2019-04-22

## 2019-04-22 RX ORDER — CEFTRIAXONE 500 MG/1
INJECTION, POWDER, FOR SOLUTION INTRAMUSCULAR; INTRAVENOUS
Qty: 0 | Refills: 0 | Status: COMPLETED | OUTPATIENT
Start: 2019-04-22 | End: 2019-04-28

## 2019-04-22 RX ORDER — WARFARIN SODIUM 2.5 MG/1
5 TABLET ORAL ONCE
Qty: 0 | Refills: 0 | Status: COMPLETED | OUTPATIENT
Start: 2019-04-22 | End: 2019-04-22

## 2019-04-22 RX ORDER — CEFTRIAXONE 500 MG/1
1 INJECTION, POWDER, FOR SOLUTION INTRAMUSCULAR; INTRAVENOUS EVERY 24 HOURS
Qty: 0 | Refills: 0 | Status: COMPLETED | OUTPATIENT
Start: 2019-04-23 | End: 2019-04-27

## 2019-04-22 RX ORDER — MAGNESIUM SULFATE 500 MG/ML
2 VIAL (ML) INJECTION ONCE
Qty: 0 | Refills: 0 | Status: COMPLETED | OUTPATIENT
Start: 2019-04-22 | End: 2019-04-22

## 2019-04-22 RX ADMIN — Medication 37.5 MILLIGRAM(S): at 21:39

## 2019-04-22 RX ADMIN — MIDODRINE HYDROCHLORIDE 5 MILLIGRAM(S): 2.5 TABLET ORAL at 11:08

## 2019-04-22 RX ADMIN — WARFARIN SODIUM 5 MILLIGRAM(S): 2.5 TABLET ORAL at 06:15

## 2019-04-22 RX ADMIN — WARFARIN SODIUM 2.5 MILLIGRAM(S): 2.5 TABLET ORAL at 06:52

## 2019-04-22 RX ADMIN — Medication 81 MILLIGRAM(S): at 11:08

## 2019-04-22 RX ADMIN — Medication 3 MILLILITER(S): at 11:08

## 2019-04-22 RX ADMIN — Medication 3 MILLILITER(S): at 05:33

## 2019-04-22 RX ADMIN — Medication 3 MILLILITER(S): at 17:23

## 2019-04-22 RX ADMIN — Medication 5 MILLIGRAM(S): at 21:39

## 2019-04-22 RX ADMIN — Medication 100 MILLIGRAM(S): at 13:10

## 2019-04-22 RX ADMIN — Medication 100 MILLIGRAM(S): at 21:39

## 2019-04-22 RX ADMIN — CEFTRIAXONE 100 GRAM(S): 500 INJECTION, POWDER, FOR SOLUTION INTRAMUSCULAR; INTRAVENOUS at 02:03

## 2019-04-22 RX ADMIN — Medication 0.5 MILLIGRAM(S): at 05:35

## 2019-04-22 RX ADMIN — Medication 0.5 MILLIGRAM(S): at 17:22

## 2019-04-22 RX ADMIN — PANTOPRAZOLE SODIUM 40 MILLIGRAM(S): 20 TABLET, DELAYED RELEASE ORAL at 08:40

## 2019-04-22 RX ADMIN — ATORVASTATIN CALCIUM 20 MILLIGRAM(S): 80 TABLET, FILM COATED ORAL at 21:38

## 2019-04-22 RX ADMIN — ENOXAPARIN SODIUM 40 MILLIGRAM(S): 100 INJECTION SUBCUTANEOUS at 06:52

## 2019-04-22 RX ADMIN — Medication 50 GRAM(S): at 05:29

## 2019-04-22 RX ADMIN — MIDODRINE HYDROCHLORIDE 5 MILLIGRAM(S): 2.5 TABLET ORAL at 21:38

## 2019-04-22 NOTE — PROGRESS NOTE ADULT - SUBJECTIVE AND OBJECTIVE BOX
CRITICAL CARE ATTENDING - CTICU    MEDICATIONS  (STANDING):  ALBUTerol    90 MICROgram(s) HFA Inhaler 1 Puff(s) Inhalation every 4 hours  ALBUTerol/ipratropium for Nebulization 3 milliLiter(s) Nebulizer every 6 hours  aspirin  chewable 81 milliGRAM(s) Oral daily  atorvastatin 20 milliGRAM(s) Oral at bedtime  buDESOnide   0.5 milliGRAM(s) Respule 0.5 milliGRAM(s) Inhalation two times a day  cefTRIAXone   IVPB      docusate sodium 100 milliGRAM(s) Oral three times a day  enoxaparin Injectable 40 milliGRAM(s) SubCutaneous daily  melatonin 5 milliGRAM(s) Oral at bedtime  midodrine 5 milliGRAM(s) Oral every 8 hours  pantoprazole    Tablet 40 milliGRAM(s) Oral before breakfast  tiotropium 18 MICROgram(s) Capsule 1 Capsule(s) Inhalation daily  venlafaxine XR. 37.5 milliGRAM(s) Oral daily                                    10.4   12.1  )-----------( 219      ( 2019 04:42 )             30.1           135  |  102  |  8   ----------------------------<  126<H>  4.5   |  21<L>  |  0.43<L>    Ca    9.8      2019 04:42  Phos  3.7       Mg     1.7         TPro  6.6  /  Alb  4.0  /  TBili  0.8  /  DBili  x   /  AST  23  /  ALT  28  /  AlkPhos  124<H>        PT/INR - ( 2019 04:42 )   PT: 15.7 sec;   INR: 1.36 ratio         PTT - ( 2019 04:42 )  PTT:28.3 sec        Daily     Daily Weight in k (2019 00:00)       @ 07:01  -   @ 07:00  --------------------------------------------------------  IN: 1425 mL / OUT: 3300 mL / NET: -1875 mL        Critically Ill patient  : [ ] preoperative ,   [x ] post operative    Requires :  [ ] Arterial Line   [ ] Central Line  [ ] PA catheter  [ ] IABP  [ ] ECMO  [ ] LVAD  [ ] Ventilator  [ ] pacemaker [ ] Impella.    Bedside evaluation , monitoring , treatment of hemodynamics , fluids , IVP/ IVCD meds.        Diagnosis:     POD 4/16 - MVR / LAAL / Maze    CHF- acute [ x]   chronic [x ]    systolic [x ]   diatolic [ ]          - Echo- EF -  MVP - MR        [ ] RV dysfunction          - Cxr-cardiomegally, edema          - Clinical-  [ x]inotropes  weaned off dobutamine    [x ]pressors   [x ]diuresis   [ ]IABP   [ ]ECMO   [ ]LVAD   [ ]Respiratory Failure    h/o Asthma    SVT    Hemodynamic lability,instability. Requires IVCD [x ] vasopressors  midodrine  [ ] inotropes  [ ] vasodilator  [ ]IVSS fluid  to maintain MAP, perfusion, C.I.     UTI - ceftriaxone    Anticoagulated on  Coumadin    Requires bedside physical therapy, mobilization and total long term care.                                       -                     Discussed with CT surgeon, Physician's Assistant - Nurse Practitioner- Critical care medicine team.   Dicussed at  AM / PM rounds.   Chart, labs , films reviewed.    Total Time: 20 min

## 2019-04-22 NOTE — PROGRESS NOTE ADULT - SUBJECTIVE AND OBJECTIVE BOX
VITAL SIGNS    Telemetry:  SR  70-80  pvc    Vital Signs Last 24 Hrs  T(C): 36.7 (19 @ 15:00), Max: 37.7 (19 @ 17:00)  T(F): 98 (19 @ 15:00), Max: 99.9 (19 @ 17:00)  HR: 79 (19 @ 15:10) (72 - 89)  BP: 93/60 (19 @ 15:10) (91/52 - 122/60)  RR: 18 (19 @ 15:00) (14 - 32)  SpO2: 98% (19 @ 15:00) (93% - 99%)                   Daily     Daily Weight in k (2019 00:00)      Bilirubin Total, Serum: 0.8 mg/dL ( @ 04:42)    CAPILLARY BLOOD GLUCOSE  122 (2019 17:00)      POCT Blood Glucose.: 122 mg/dL (2019 17:27)         Pacing Wires                                  Isolated     Coumadin    YES       :                         PHYSICAL EXAM    Neurology: alert and oriented x 3, moves all extremities with no defecits  CV :  RRR  Sternal Wound :  CDI , Stable  with pw  Lungs:   CTA B/L    Abdomen: soft, nontender, nondistended, positive bowel sounds, last bowel movement   preop  Extremities:     trace  le edema   no calf tenderness

## 2019-04-22 NOTE — PROGRESS NOTE ADULT - ASSESSMENT
69  yr old female sp   MVR/KASIE, MAZE  H  post op   Inotropes,  fevers +UTI 69  yr old female sp   MVR/KASIE, MAZE  H  post op   Inotropes,  fevers +UTI,  coumadin dosing  4/22  Trf floor  NSR ,  + PW  coumadin dose 7.5 mg,  dosed this am    ambulating 69  yr old female sp   MVR/KASIE, MAZE  H  post op   Inotropes,  fevers +UTI  on IV  ABX  ,  coumadin dosing  4/22  Trf floor  NSR ,  + PW  coumadin dose 7.5 mg,  dosed this am    ambulating

## 2019-04-23 ENCOUNTER — TRANSCRIPTION ENCOUNTER (OUTPATIENT)
Age: 69
End: 2019-04-23

## 2019-04-23 DIAGNOSIS — I48.91 UNSPECIFIED ATRIAL FIBRILLATION: ICD-10-CM

## 2019-04-23 LAB
APTT BLD: 29.2 SEC — SIGNIFICANT CHANGE UP (ref 27.5–36.3)
HCT VFR BLD CALC: 29.6 % — LOW (ref 34.5–45)
HGB BLD-MCNC: 10.1 G/DL — LOW (ref 11.5–15.5)
INR BLD: 1.52 RATIO — HIGH (ref 0.88–1.16)
MAGNESIUM SERPL-MCNC: 2.4 MG/DL — SIGNIFICANT CHANGE UP (ref 1.6–2.6)
MCHC RBC-ENTMCNC: 30.9 PG — SIGNIFICANT CHANGE UP (ref 27–34)
MCHC RBC-ENTMCNC: 34.2 GM/DL — SIGNIFICANT CHANGE UP (ref 32–36)
MCV RBC AUTO: 90.5 FL — SIGNIFICANT CHANGE UP (ref 80–100)
PHOSPHATE SERPL-MCNC: 3.2 MG/DL — SIGNIFICANT CHANGE UP (ref 2.5–4.5)
PLATELET # BLD AUTO: 292 K/UL — SIGNIFICANT CHANGE UP (ref 150–400)
PROTHROM AB SERPL-ACNC: 17.5 SEC — HIGH (ref 10–12.9)
RBC # BLD: 3.27 M/UL — LOW (ref 3.8–5.2)
RBC # FLD: 12.9 % — SIGNIFICANT CHANGE UP (ref 10.3–14.5)
WBC # BLD: 10.8 K/UL — HIGH (ref 3.8–10.5)
WBC # FLD AUTO: 10.8 K/UL — HIGH (ref 3.8–10.5)

## 2019-04-23 PROCEDURE — 93010 ELECTROCARDIOGRAM REPORT: CPT

## 2019-04-23 RX ORDER — METOPROLOL TARTRATE 50 MG
5 TABLET ORAL ONCE
Qty: 0 | Refills: 0 | Status: COMPLETED | OUTPATIENT
Start: 2019-04-23 | End: 2019-04-23

## 2019-04-23 RX ORDER — AMIODARONE HYDROCHLORIDE 400 MG/1
400 TABLET ORAL EVERY 8 HOURS
Qty: 0 | Refills: 0 | Status: DISCONTINUED | OUTPATIENT
Start: 2019-04-23 | End: 2019-04-24

## 2019-04-23 RX ORDER — MAGNESIUM SULFATE 500 MG/ML
2 VIAL (ML) INJECTION ONCE
Qty: 0 | Refills: 0 | Status: COMPLETED | OUTPATIENT
Start: 2019-04-23 | End: 2019-04-23

## 2019-04-23 RX ORDER — AMIODARONE HYDROCHLORIDE 400 MG/1
TABLET ORAL
Qty: 0 | Refills: 0 | Status: DISCONTINUED | OUTPATIENT
Start: 2019-04-23 | End: 2019-04-24

## 2019-04-23 RX ORDER — METOPROLOL TARTRATE 50 MG
25 TABLET ORAL
Qty: 0 | Refills: 0 | Status: DISCONTINUED | OUTPATIENT
Start: 2019-04-23 | End: 2019-04-23

## 2019-04-23 RX ORDER — WARFARIN SODIUM 2.5 MG/1
5 TABLET ORAL ONCE
Qty: 0 | Refills: 0 | Status: COMPLETED | OUTPATIENT
Start: 2019-04-23 | End: 2019-04-23

## 2019-04-23 RX ADMIN — Medication 3 MILLILITER(S): at 05:33

## 2019-04-23 RX ADMIN — Medication 3 MILLILITER(S): at 17:41

## 2019-04-23 RX ADMIN — Medication 100 MILLIGRAM(S): at 22:10

## 2019-04-23 RX ADMIN — ATORVASTATIN CALCIUM 20 MILLIGRAM(S): 80 TABLET, FILM COATED ORAL at 22:09

## 2019-04-23 RX ADMIN — Medication 3 MILLILITER(S): at 13:32

## 2019-04-23 RX ADMIN — AMIODARONE HYDROCHLORIDE 400 MILLIGRAM(S): 400 TABLET ORAL at 07:07

## 2019-04-23 RX ADMIN — MIDODRINE HYDROCHLORIDE 5 MILLIGRAM(S): 2.5 TABLET ORAL at 22:09

## 2019-04-23 RX ADMIN — Medication 5 MILLIGRAM(S): at 06:13

## 2019-04-23 RX ADMIN — ENOXAPARIN SODIUM 40 MILLIGRAM(S): 100 INJECTION SUBCUTANEOUS at 13:36

## 2019-04-23 RX ADMIN — AMIODARONE HYDROCHLORIDE 400 MILLIGRAM(S): 400 TABLET ORAL at 22:10

## 2019-04-23 RX ADMIN — MIDODRINE HYDROCHLORIDE 5 MILLIGRAM(S): 2.5 TABLET ORAL at 14:07

## 2019-04-23 RX ADMIN — Medication 0.5 MILLIGRAM(S): at 05:33

## 2019-04-23 RX ADMIN — Medication 100 MILLIGRAM(S): at 05:34

## 2019-04-23 RX ADMIN — PANTOPRAZOLE SODIUM 40 MILLIGRAM(S): 20 TABLET, DELAYED RELEASE ORAL at 05:33

## 2019-04-23 RX ADMIN — WARFARIN SODIUM 5 MILLIGRAM(S): 2.5 TABLET ORAL at 14:30

## 2019-04-23 RX ADMIN — CEFTRIAXONE 100 GRAM(S): 500 INJECTION, POWDER, FOR SOLUTION INTRAMUSCULAR; INTRAVENOUS at 00:23

## 2019-04-23 RX ADMIN — AMIODARONE HYDROCHLORIDE 400 MILLIGRAM(S): 400 TABLET ORAL at 14:07

## 2019-04-23 RX ADMIN — Medication 0.5 MILLIGRAM(S): at 17:41

## 2019-04-23 RX ADMIN — Medication 5 MILLIGRAM(S): at 22:09

## 2019-04-23 RX ADMIN — Medication 100 MILLIGRAM(S): at 13:36

## 2019-04-23 RX ADMIN — Medication 37.5 MILLIGRAM(S): at 22:10

## 2019-04-23 RX ADMIN — MIDODRINE HYDROCHLORIDE 5 MILLIGRAM(S): 2.5 TABLET ORAL at 05:33

## 2019-04-23 RX ADMIN — Medication 50 GRAM(S): at 06:13

## 2019-04-23 RX ADMIN — Medication 81 MILLIGRAM(S): at 13:36

## 2019-04-23 NOTE — DISCHARGE NOTE PROVIDER - REASON FOR ADMISSION
sp   MVR T  KASIE s/p 4/16/19 open heart surgery: mitral valve replacement with tissue valve/  KASIE ligation

## 2019-04-23 NOTE — PROGRESS NOTE ADULT - SUBJECTIVE AND OBJECTIVE BOX
VITAL SIGNS    Telemetry:     acc [facility]  70-80    Vital Signs Last 24 Hrs  T(C): 36.9 (19 @ 14:08), Max: 36.9 (19 @ 20:20)  T(F): 98.4 (19 @ 14:08), Max: 98.4 (19 @ 20:20)  HR: 77 (19 @ 14:08) (73 - 119)  BP: 116/73 (19 @ 14:08) (101/66 - 118/73)  RR: 18 (19 @ 14:08) (18 - 18)  SpO2: 93% (19 @ 14:08) (93% - 99%)           Daily     Daily Weight in k.9 (2019 10:00)        CAPILLARY BLOOD GLUCOSE             Pacing Wires                                  Isolated     Coumadin    YES                                 PHYSICAL EXAM    Neurology: alert and oriented x 3, moves all extremities with no defecits  CV :  RRR  Sternal Wound :  CDI , Stable  with pw  Lungs:   CTA B/L  Abdomen: soft, nontender, nondistended, positive bowel sounds  Extremities:       no edema   no calf tenderness

## 2019-04-23 NOTE — PROGRESS NOTE ADULT - ASSESSMENT
69  yr old female sp   MVR/KASIE, MAZE  H  post op   Inotropes,  fevers +UTI  on IV  ABX  ,  coumadin dosing  4/22  Trf floor  NSR ,  + PW  coumadin dose 7.5 mg,  dosed this am    ambulating  4/23      amio  load  for KVNG ,   coumadin dosing 5 mg  for  INR   1.57,   +  PW

## 2019-04-23 NOTE — DISCHARGE NOTE PROVIDER - NSDCACTIVITY_GEN_ALL_CORE
Walking - Outdoors allowed/Walking - Indoors allowed/No heavy lifting/straining/Do not make important decisions/Stairs allowed/Sex allowed/Do not drive or operate machinery/Showering allowed

## 2019-04-23 NOTE — DISCHARGE NOTE PROVIDER - NSDCPNSUBOBJ_GEN_ALL_CORE
VSS    tele: RSR 70-90 with pvc's     midsternal incision cdi sanjuanita    lungs clear, RR easy unlabored    + bs nt nd + bm; neg n/v/d    LE: neg calf tenderness, neg LE edema, PPP bilaterally        Discharge pt home today 4/29 as per Dr. Bergman and with patch as per Ep    Dr. Bergman's office to follow INR levels as an outpatient

## 2019-04-23 NOTE — PROVIDER CONTACT NOTE (OTHER) - SITUATION
Tele tech reporting P waves are missing & suggesting Etopic Atrial Rhythm w/ episodes of accelerated junctional

## 2019-04-23 NOTE — DISCHARGE NOTE PROVIDER - HOSPITAL COURSE
69  yr old female sp   MVR/KASIE, MAZE  H    post op   Inotropes,  fevers +UTI  on IV  ABX  ,  coumadin dosing    4/22  Trf floor  NSR ,  + PW  coumadin dose 7.5 mg,  dosed this am    ambulating    4/23      amio  load  for KVNG ,   coumadin dosing 5 mg  for  INR   1.57,   +  PW    4/24     inc  amio to 400 BID  per EP  cslt and monitor   coumadin dosing of 7.5 mf   for INR   1.54   ambulating    4/25    EKG  NSR first degree    BP stable  isolated pw    INR    1.58   coumadin 5 mg   off amio and BB with EP following    4/26 VSS; INR 1.6- coumadin 7.5 mg this am; initiate low dose bb as per EPS today- lopressor 12. 5 mg po bid and observe HR    IV abx d/c     4/27 VSS; INR 1.7 - coumadin 7.5 mg this evening; increase lopressor 25 mg po q8 as per DR. Bergman; EKG done- qtc < 600 ; ck pa/lat chest xray;     Discharge planning- home when HR and INR stable monday likely     4/28: HR stable SR 80s-INR 1.7  coum 7.5 today  today improvement from yesterday    4/29 VSS: INR 1.8 today- Discharge pt home today with coumadin 7.5 mg - Dr. Bergman to follow INR levels as an outpatient; lopressor changed to atenolol 50 mg po q12     EP- recommendation - discharge pt home with ade and f/u with Dr. Pugh as an outpatient     Discharge pt home today 4/29 as per Dr. Bergman - pt refusing home care

## 2019-04-23 NOTE — DISCHARGE NOTE PROVIDER - CARE PROVIDERS DIRECT ADDRESSES
[email or web address]

## 2019-04-23 NOTE — DISCHARGE NOTE PROVIDER - CARE PROVIDER_API CALL
Kobe Bergman (MD)  Surgery; Surgical Critical Care; Thoracic and Cardiac Surgery  [address] [place] [address]  Phone: [phone]  Fax: [phone]  Follow Up Time: Kobe Bergman)  Surgery; Surgical Critical Care; Thoracic and Cardiac Surgery  [address] [place] [address]  Phone: [phone]  Fax: [phone]  Follow Up Time:     Jared Pugh)  Cardiac Electrophysiology; Cardiology  [address] [place] [address]  Phone: [phone]  Fax: [phone]  Follow Up Time: 2 weeks

## 2019-04-23 NOTE — DISCHARGE NOTE PROVIDER - PROVIDER TOKENS
PROVIDER:[TOKEN:[[id number]]] PROVIDER:[TOKEN:[[id number]]],PROVIDER:[TOKEN:[[id number]],FOLLOWUP:[2 weeks]]

## 2019-04-23 NOTE — DISCHARGE NOTE PROVIDER - NSDCFUADDINST_GEN_ALL_CORE_FT
refer to cardiac surgery do and don't checklist  no driving until cleared by Dr. Bergman  call Dr. Bergman's office for fever > 101  coumadin bloodwork ( INR levels) to be followed and dosed by Dr. Bergman's office

## 2019-04-23 NOTE — DISCHARGE NOTE PROVIDER - NSDCCPCAREPLAN_GEN_ALL_CORE_FT
PRINCIPAL DISCHARGE DIAGNOSIS  Diagnosis: Mitral insufficiency  Assessment and Plan of Treatment: sp  MVR     shower daily   coumadin dosing PRINCIPAL DISCHARGE DIAGNOSIS  Diagnosis: S/P MVR (mitral valve replacement)  Assessment and Plan of Treatment: shower daily  weigh yourself daily  continue current prescriptions as ordered  increase activity as tolerated   no added salt; low fat; low cholesterol, low salt diet.   follow up with Cardiologist in 1-2 weeks. Call to schedule appointment.  follow up with cardiac surgeon, Dr. Bergman, on May 8th at 2:45 pm. Call to confirm appointment [phone]  Dr. Bergman's office to follow coumadin levels (INR levels)  as an outpatient. INR level to be drawn wed 5/1 and then every monday and thur thereafter   follow up with EPS doctor, Dr. Pugh in 1-2 weeks for home monitor follow up. Call to schedule appointment. [phone]

## 2019-04-24 LAB
-  AMIKACIN: SIGNIFICANT CHANGE UP
-  AMPICILLIN/SULBACTAM: SIGNIFICANT CHANGE UP
-  AMPICILLIN: SIGNIFICANT CHANGE UP
-  AZTREONAM: SIGNIFICANT CHANGE UP
-  CEFAZOLIN: SIGNIFICANT CHANGE UP
-  CEFEPIME: SIGNIFICANT CHANGE UP
-  CEFOXITIN: SIGNIFICANT CHANGE UP
-  CEFTRIAXONE: SIGNIFICANT CHANGE UP
-  CIPROFLOXACIN: SIGNIFICANT CHANGE UP
-  ERTAPENEM: SIGNIFICANT CHANGE UP
-  GENTAMICIN: SIGNIFICANT CHANGE UP
-  IMIPENEM: SIGNIFICANT CHANGE UP
-  LEVOFLOXACIN: SIGNIFICANT CHANGE UP
-  MEROPENEM: SIGNIFICANT CHANGE UP
-  NITROFURANTOIN: SIGNIFICANT CHANGE UP
-  PIPERACILLIN/TAZOBACTAM: SIGNIFICANT CHANGE UP
-  TIGECYCLINE: SIGNIFICANT CHANGE UP
-  TOBRAMYCIN: SIGNIFICANT CHANGE UP
-  TRIMETHOPRIM/SULFAMETHOXAZOLE: SIGNIFICANT CHANGE UP
ANION GAP SERPL CALC-SCNC: 12 MMOL/L — SIGNIFICANT CHANGE UP (ref 5–17)
ANION GAP SERPL CALC-SCNC: 13 MMOL/L — SIGNIFICANT CHANGE UP (ref 5–17)
BASOPHILS # BLD AUTO: 0 K/UL — SIGNIFICANT CHANGE UP (ref 0–0.2)
BASOPHILS NFR BLD AUTO: 0.6 % — SIGNIFICANT CHANGE UP (ref 0–2)
BUN SERPL-MCNC: 10 MG/DL — SIGNIFICANT CHANGE UP (ref 7–23)
BUN SERPL-MCNC: 11 MG/DL — SIGNIFICANT CHANGE UP (ref 7–23)
CALCIUM SERPL-MCNC: 9.3 MG/DL — SIGNIFICANT CHANGE UP (ref 8.4–10.5)
CALCIUM SERPL-MCNC: 9.5 MG/DL — SIGNIFICANT CHANGE UP (ref 8.4–10.5)
CHLORIDE SERPL-SCNC: 102 MMOL/L — SIGNIFICANT CHANGE UP (ref 96–108)
CHLORIDE SERPL-SCNC: 104 MMOL/L — SIGNIFICANT CHANGE UP (ref 96–108)
CO2 SERPL-SCNC: 18 MMOL/L — LOW (ref 22–31)
CO2 SERPL-SCNC: 19 MMOL/L — LOW (ref 22–31)
CREAT SERPL-MCNC: 0.44 MG/DL — LOW (ref 0.5–1.3)
CREAT SERPL-MCNC: 0.46 MG/DL — LOW (ref 0.5–1.3)
CULTURE RESULTS: SIGNIFICANT CHANGE UP
EOSINOPHIL # BLD AUTO: 0.4 K/UL — SIGNIFICANT CHANGE UP (ref 0–0.5)
EOSINOPHIL NFR BLD AUTO: 4.1 % — SIGNIFICANT CHANGE UP (ref 0–6)
GLUCOSE SERPL-MCNC: 112 MG/DL — HIGH (ref 70–99)
GLUCOSE SERPL-MCNC: 125 MG/DL — HIGH (ref 70–99)
HCT VFR BLD CALC: 28.1 % — LOW (ref 34.5–45)
HGB BLD-MCNC: 9.4 G/DL — LOW (ref 11.5–15.5)
INR BLD: 1.54 RATIO — HIGH (ref 0.88–1.16)
LYMPHOCYTES # BLD AUTO: 1.6 K/UL — SIGNIFICANT CHANGE UP (ref 1–3.3)
LYMPHOCYTES # BLD AUTO: 18 % — SIGNIFICANT CHANGE UP (ref 13–44)
MAGNESIUM SERPL-MCNC: 2 MG/DL — SIGNIFICANT CHANGE UP (ref 1.6–2.6)
MAGNESIUM SERPL-MCNC: 2.4 MG/DL — SIGNIFICANT CHANGE UP (ref 1.6–2.6)
MAGNESIUM SERPL-MCNC: 3 MG/DL — HIGH (ref 1.6–2.6)
MCHC RBC-ENTMCNC: 30.4 PG — SIGNIFICANT CHANGE UP (ref 27–34)
MCHC RBC-ENTMCNC: 33.5 GM/DL — SIGNIFICANT CHANGE UP (ref 32–36)
MCV RBC AUTO: 90.7 FL — SIGNIFICANT CHANGE UP (ref 80–100)
METHOD TYPE: SIGNIFICANT CHANGE UP
MONOCYTES # BLD AUTO: 1.2 K/UL — HIGH (ref 0–0.9)
MONOCYTES NFR BLD AUTO: 14.2 % — HIGH (ref 2–14)
NEUTROPHILS # BLD AUTO: 5.4 K/UL — SIGNIFICANT CHANGE UP (ref 1.8–7.4)
NEUTROPHILS NFR BLD AUTO: 63.1 % — SIGNIFICANT CHANGE UP (ref 43–77)
ORGANISM # SPEC MICROSCOPIC CNT: SIGNIFICANT CHANGE UP
ORGANISM # SPEC MICROSCOPIC CNT: SIGNIFICANT CHANGE UP
PHOSPHATE SERPL-MCNC: 2.7 MG/DL — SIGNIFICANT CHANGE UP (ref 2.5–4.5)
PLATELET # BLD AUTO: 310 K/UL — SIGNIFICANT CHANGE UP (ref 150–400)
POTASSIUM SERPL-MCNC: 4.1 MMOL/L — SIGNIFICANT CHANGE UP (ref 3.5–5.3)
POTASSIUM SERPL-MCNC: 4.2 MMOL/L — SIGNIFICANT CHANGE UP (ref 3.5–5.3)
POTASSIUM SERPL-SCNC: 4.1 MMOL/L — SIGNIFICANT CHANGE UP (ref 3.5–5.3)
POTASSIUM SERPL-SCNC: 4.2 MMOL/L — SIGNIFICANT CHANGE UP (ref 3.5–5.3)
PROTHROM AB SERPL-ACNC: 17.8 SEC — HIGH (ref 10–12.9)
RBC # BLD: 3.1 M/UL — LOW (ref 3.8–5.2)
RBC # FLD: 13.1 % — SIGNIFICANT CHANGE UP (ref 10.3–14.5)
SODIUM SERPL-SCNC: 132 MMOL/L — LOW (ref 135–145)
SODIUM SERPL-SCNC: 136 MMOL/L — SIGNIFICANT CHANGE UP (ref 135–145)
SPECIMEN SOURCE: SIGNIFICANT CHANGE UP
WBC # BLD: 8.6 K/UL — SIGNIFICANT CHANGE UP (ref 3.8–10.5)
WBC # FLD AUTO: 8.6 K/UL — SIGNIFICANT CHANGE UP (ref 3.8–10.5)

## 2019-04-24 PROCEDURE — 93010 ELECTROCARDIOGRAM REPORT: CPT

## 2019-04-24 RX ORDER — AMIODARONE HYDROCHLORIDE 400 MG/1
200 TABLET ORAL DAILY
Qty: 0 | Refills: 0 | Status: DISCONTINUED | OUTPATIENT
Start: 2019-04-24 | End: 2019-04-24

## 2019-04-24 RX ORDER — MAGNESIUM SULFATE 500 MG/ML
2 VIAL (ML) INJECTION ONCE
Qty: 0 | Refills: 0 | Status: COMPLETED | OUTPATIENT
Start: 2019-04-24 | End: 2019-04-24

## 2019-04-24 RX ORDER — POTASSIUM CHLORIDE 20 MEQ
10 PACKET (EA) ORAL ONCE
Qty: 0 | Refills: 0 | Status: COMPLETED | OUTPATIENT
Start: 2019-04-24 | End: 2019-04-24

## 2019-04-24 RX ORDER — WARFARIN SODIUM 2.5 MG/1
5 TABLET ORAL ONCE
Qty: 0 | Refills: 0 | Status: DISCONTINUED | OUTPATIENT
Start: 2019-04-24 | End: 2019-04-24

## 2019-04-24 RX ORDER — AMIODARONE HYDROCHLORIDE 400 MG/1
400 TABLET ORAL EVERY 12 HOURS
Qty: 0 | Refills: 0 | Status: DISCONTINUED | OUTPATIENT
Start: 2019-04-24 | End: 2019-04-24

## 2019-04-24 RX ORDER — BUPIVACAINE HCL/EPINEPHRINE/PF 0.5-1:200K
10 VIAL (ML) INJECTION ONCE
Qty: 0 | Refills: 0 | Status: DISCONTINUED | OUTPATIENT
Start: 2019-04-24 | End: 2019-04-24

## 2019-04-24 RX ORDER — WARFARIN SODIUM 2.5 MG/1
7.5 TABLET ORAL ONCE
Qty: 0 | Refills: 0 | Status: DISCONTINUED | OUTPATIENT
Start: 2019-04-24 | End: 2019-04-24

## 2019-04-24 RX ORDER — AMIODARONE HYDROCHLORIDE 400 MG/1
200 TABLET ORAL ONCE
Qty: 0 | Refills: 0 | Status: COMPLETED | OUTPATIENT
Start: 2019-04-24 | End: 2019-04-24

## 2019-04-24 RX ORDER — BUPIVACAINE HCL/PF 7.5 MG/ML
10 VIAL (ML) INJECTION ONCE
Qty: 0 | Refills: 0 | Status: COMPLETED | OUTPATIENT
Start: 2019-04-24 | End: 2019-04-24

## 2019-04-24 RX ORDER — LIDOCAINE HCL 20 MG/ML
100 VIAL (ML) INJECTION ONCE
Qty: 0 | Refills: 0 | Status: COMPLETED | OUTPATIENT
Start: 2019-04-24 | End: 2019-04-24

## 2019-04-24 RX ORDER — WARFARIN SODIUM 2.5 MG/1
5 TABLET ORAL ONCE
Qty: 0 | Refills: 0 | Status: COMPLETED | OUTPATIENT
Start: 2019-04-24 | End: 2019-04-24

## 2019-04-24 RX ORDER — AMIODARONE HYDROCHLORIDE 400 MG/1
200 TABLET ORAL ONCE
Qty: 0 | Refills: 0 | Status: DISCONTINUED | OUTPATIENT
Start: 2019-04-24 | End: 2019-04-24

## 2019-04-24 RX ADMIN — Medication 81 MILLIGRAM(S): at 13:29

## 2019-04-24 RX ADMIN — CEFTRIAXONE 100 GRAM(S): 500 INJECTION, POWDER, FOR SOLUTION INTRAMUSCULAR; INTRAVENOUS at 00:45

## 2019-04-24 RX ADMIN — Medication 50 GRAM(S): at 15:30

## 2019-04-24 RX ADMIN — ATORVASTATIN CALCIUM 20 MILLIGRAM(S): 80 TABLET, FILM COATED ORAL at 21:24

## 2019-04-24 RX ADMIN — MIDODRINE HYDROCHLORIDE 5 MILLIGRAM(S): 2.5 TABLET ORAL at 21:24

## 2019-04-24 RX ADMIN — Medication 3 MILLILITER(S): at 13:29

## 2019-04-24 RX ADMIN — ENOXAPARIN SODIUM 40 MILLIGRAM(S): 100 INJECTION SUBCUTANEOUS at 13:29

## 2019-04-24 RX ADMIN — Medication 3 MILLILITER(S): at 00:44

## 2019-04-24 RX ADMIN — Medication 10 MILLILITER(S): at 19:35

## 2019-04-24 RX ADMIN — Medication 100 MILLIGRAM(S): at 05:35

## 2019-04-24 RX ADMIN — AMIODARONE HYDROCHLORIDE 200 MILLIGRAM(S): 400 TABLET ORAL at 05:37

## 2019-04-24 RX ADMIN — MIDODRINE HYDROCHLORIDE 5 MILLIGRAM(S): 2.5 TABLET ORAL at 13:43

## 2019-04-24 RX ADMIN — Medication 0.5 MILLIGRAM(S): at 05:35

## 2019-04-24 RX ADMIN — PANTOPRAZOLE SODIUM 40 MILLIGRAM(S): 20 TABLET, DELAYED RELEASE ORAL at 05:38

## 2019-04-24 RX ADMIN — Medication 3 MILLILITER(S): at 05:35

## 2019-04-24 RX ADMIN — Medication 100 MILLIGRAM(S): at 21:24

## 2019-04-24 RX ADMIN — Medication 100 MILLIGRAM(S): at 13:29

## 2019-04-24 RX ADMIN — WARFARIN SODIUM 5 MILLIGRAM(S): 2.5 TABLET ORAL at 17:58

## 2019-04-24 RX ADMIN — Medication 0.5 MILLIGRAM(S): at 17:58

## 2019-04-24 RX ADMIN — Medication 100 MILLIGRAM(S): at 17:22

## 2019-04-24 RX ADMIN — Medication 5 MILLIGRAM(S): at 21:24

## 2019-04-24 RX ADMIN — AMIODARONE HYDROCHLORIDE 200 MILLIGRAM(S): 400 TABLET ORAL at 14:08

## 2019-04-24 RX ADMIN — MIDODRINE HYDROCHLORIDE 5 MILLIGRAM(S): 2.5 TABLET ORAL at 05:35

## 2019-04-24 RX ADMIN — Medication 10 MILLIEQUIVALENT(S): at 08:51

## 2019-04-24 NOTE — DIETITIAN INITIAL EVALUATION ADULT. - NS AS NUTRI INTERV MEALS SNACK
General/healthful diet/Recommend continue current regular general healthful diet order to promote optimal po intake. Encourage pt to consume adequate protein and energy at meals, discussed protein and nutrient dense food items to try.

## 2019-04-24 NOTE — PROVIDER CONTACT NOTE (OTHER) - ACTION/TREATMENT ORDERED:
NP made aware. NP at bedside to assess pt. 2gram of magnesium ordered and given. Pt placed back on external pacemaker VVI 50/10. Dr. Ziegler called by NP and at bedside to see patient.

## 2019-04-24 NOTE — DIETITIAN INITIAL EVALUATION ADULT. - NS AS NUTRI INTERV ED CONTENT3
Purpose of the nutrition education/Nutrition relationship to health/disease/Recommended modifications/Educated pt on Coumadin dietary interaction. Discussed the following: Coumadin-vitamin K interaction, vitamin K consistency, food sources of vitamin K, other dietary interaction. Pt voiced understanding and accepted Low Sodium nutrition therapy handout; Heart-healthy nutrition therapy handout and Coumadin booklet for references at her leisure.

## 2019-04-24 NOTE — PROVIDER CONTACT NOTE (OTHER) - ASSESSMENT
BP: 95/63  RESP: 18  Spo2: 95   Patient sitting in chair asymptomatic, pt denies dizziness, and palpitations

## 2019-04-24 NOTE — PROGRESS NOTE ADULT - SUBJECTIVE AND OBJECTIVE BOX
VITAL SIGNS    Telemetry:    acc junctional  Vital Signs Last 24 Hrs  T(C): 36.8 (19 @ 04:39), Max: 37 (19 @ 20:27)  T(F): 98.2 (19 @ 04:39), Max: 98.6 (19 @ 20:27)  HR: 71 (19 @ 04:39) (71 - 77)  BP: 113/69 (19 @ 04:39) (110/56 - 116/73)  RR: 18 (19 @ 04:39) (18 - 18)  SpO2: 95% (19 @ 04:39) (93% - 96%)                   Daily     Daily Weight in k.4 (2019 09:03)        CAPILLARY BLOOD GLUCOSE             Pacing Wires                       Isolated     Coumadin    YES                      PHYSICAL EXAM    Neurology: alert and oriented x 3, moves all extremities with no defecits  CV :  RRR  Sternal Wound :  CDI , Stable  with pw  Lungs:   CTA B/L  Abdomen: soft, nontender, nondistended, positive bowel sounds, last bowel movement      Extremities:       trace  b/l  le edema   no calf tenderness

## 2019-04-24 NOTE — CONSULT NOTE ADULT - SUBJECTIVE AND OBJECTIVE BOX
Patient seen and evaluated @   Reason for consult: AF    HPI: 69 year old F pt with PMH of MVP/severe MR and HLD s/p bioprosthetic MVR and maze with LA exclusion(), EP consulted for AF RVR. When seen on the floor, pt was resting comfortably in bed. She noted a brief episode of palpitation yesterday morning, which correlated with ?AF RVR with artifacts. Pt stated she's been feeling fine since that one isolated event. No further questions at this time.    Primary Service HPI:  70 yo F with h/o HLD, MVP/MR, allergy induced Asthma (controlled) had f/u cardiology evaluation. ECHO revealed moderate to severe MR . Pt had cardiac cath /surgical consult- scheduled for mitral valve repair, possible replacement, radio frequency, Maze procedure, left atrial appendage closure on 2019. Pt denies any chest pain/SOB/syncope. (10 Apr 2019 10:14)    PMH:   Allergy-induced asthma, mild intermittent, uncomplicated  Mitral valve insufficiency  Osteopenia  MVP (mitral valve prolapse)  Trigger finger  Hyperlipidemia  Hypertension    PSH:   H/O laminectomy  Cataract  Breast fibroadenoma in female, left    Medications:   ALBUTerol    90 MICROgram(s) HFA Inhaler 1 Puff(s) Inhalation every 4 hours  ALBUTerol/ipratropium for Nebulization 3 milliLiter(s) Nebulizer every 6 hours  amiodarone    Tablet 400 milliGRAM(s) Oral every 12 hours  aspirin  chewable 81 milliGRAM(s) Oral daily  atorvastatin 20 milliGRAM(s) Oral at bedtime  buDESOnide   0.5 milliGRAM(s) Respule 0.5 milliGRAM(s) Inhalation two times a day  cefTRIAXone   IVPB      cefTRIAXone   IVPB 1 Gram(s) IV Intermittent every 24 hours  docusate sodium 100 milliGRAM(s) Oral three times a day  enoxaparin Injectable 40 milliGRAM(s) SubCutaneous daily  melatonin 5 milliGRAM(s) Oral at bedtime  midodrine 5 milliGRAM(s) Oral every 8 hours  pantoprazole    Tablet 40 milliGRAM(s) Oral before breakfast  tiotropium 18 MICROgram(s) Capsule 1 Capsule(s) Inhalation daily  venlafaxine XR. 37.5 milliGRAM(s) Oral daily  warfarin 7.5 milliGRAM(s) Oral once    Allergies:  adhesives (Blisters)  No Known Drug Allergies    FAMILY HISTORY:  Family history of coronary artery disease (Mother): h/o open heart surgery  Family history of hypertension (Mother)    Social History:  Smoking:  Alcohol:  Drugs:    Review of Systems:  Constitutional: [ ] Fever [ ] Chills [ ] Fatigue [ ] Weight change   HEENT: [ ] Blurred vision [ ] Eye Pain [ ] Headache [ ] Runny nose [ ] Sore Throat   Respiratory: [ ] Cough [ ] Wheezing [ ] Shortness of breath  Cardiovascular: [ ] Chest Pain [ ] Palpitations [ ] GAYTAN [ ] PND [ ] Orthopnea  Gastrointestinal: [ ] Abdominal Pain [ ] Diarrhea [ ] Constipation [ ] Hemorrhoids [ ] Nausea [ ] Vomiting  Genitourinary: [ ] Nocturia [ ] Dysuria [ ] Incontinence  Extremities: [ ] Swelling [ ] Joint Pain  Neurologic: [ ] Focal deficit [ ] Paresthesias [ ] Syncope  Lymphatic: [ ] Swelling [ ] Lymphadenopathy   Skin: [ ] Rash [ ] Ecchymoses [ ] Wounds [ ] Lesions  Psychiatry: [ ] Depression [ ] Suicidal/Homicidal Ideation [ ] Anxiety [ ] Sleep Disturbances  [ ] 10 point review of systems is otherwise negative except as mentioned above            [ ]Unable to obtain    Physical Exam:  T(C): 36.8 (19 @ 04:39), Max: 37 (19 @ 20:27)  HR: 71 (19 @ 04:39) (71 - 75)  BP: 113/69 (19 @ 04:39) (110/56 - 113/69)  RR: 18 (19 @ 04:39) (18 - 18)  SpO2: 95% (19 @ 04:39) (95% - 96%)  Wt(kg): --     @ 07:01  -   @ 07:00  --------------------------------------------------------  IN: 770 mL / OUT: 200 mL / NET: 570 mL     @ 07:01  -   @ 14:19  --------------------------------------------------------  IN: 240 mL / OUT: 400 mL / NET: -160 mL      Daily     Daily Weight in k.4 (2019 09:03)    Appearance: NAD  Eyes: PERRL, EOMI  HENT: Normal oral mucosa, NC/AT  Cardiovascular: normal S1 and S2, RRR, no m/r/g, no edema, normal JVP  Procedural Access Site:  No hematoma, non-tender to palpation, 2+ pulses distally, no bruit, no ecchymosis  Respiratory: Clear to auscultation bilaterally  Gastrointestinal: Soft, non-tender, non-distended, BS+  Musculoskeletal: No clubbing, no joint deformity   Neurologic: Non-focal  Lymphatic: No lymphadenopathy  Psychiatry: AAOx3, mood & affect appropriate  Skin: No rashes, no ecchymoses, no cyanosis    Cardiovascular Diagnostic Testing:  ECG: Ectopic atrial rhythm    Echo:    Stress Testing:    Cath:    Interpretation of Telemetry: Ectopic/junction rhythm 70-80's    Imaging:    Labs:                        9.4    8.6   )-----------( 310      ( 2019 05:51 )             28.1     -    136  |  104  |  11  ----------------------------<  112<H>  4.1   |  19<L>  |  0.46<L>    Ca    9.3      2019 05:51  Phos  2.7       Mg     2.0           PT/INR - ( 2019 05:51 )   PT: 17.8 sec;   INR: 1.54 ratio         PTT - ( 2019 08:43 )  PTT:29.2 sec            Thyroid Stimulating Hormone, Serum: 2.89 uIU/mL ( @ 10:47)

## 2019-04-24 NOTE — CONSULT NOTE ADULT - ASSESSMENT
A/P: 69 year old F pt with PMH of MVP/severe MR and HLD s/p bioprosthetic MVR and maze with LA exclusion(4/16), EP consulted for AF RVR.    - clinically stable with no palpitations since brief episode on 4/23 AM  - ekg and telemetry reviewed, in junction vs ectopic atrial rhythm VR 70-80's  - pt given 400 mg amio x 3 doses started on 4/23 and one dose of 200 mg on 4/24  - c/w 400 mg amio BID, will continue to check on telemetry  - will discuss with Dr. Will, please call for further questions    Tirso Ziegler, #[id number]  EP Fellow

## 2019-04-24 NOTE — PROGRESS NOTE ADULT - ASSESSMENT
69  yr old female sp   MVR/KASIE, MAZE  H  post op   Inotropes,  fevers +UTI  on IV  ABX  ,  coumadin dosing  4/22  Trf floor  NSR ,  + PW  coumadin dose 7.5 mg,  dosed this am    ambulating  4/23      amio  load  for KVNG ,   coumadin dosing 5 mg  for  INR   1.57,   +  PW  4/24     inc  amio to 400 BID  per EP  cslt and monitor   coumadin dosing of 7.5 mf   for INR   1.54   ambulating

## 2019-04-24 NOTE — DIETITIAN INITIAL EVALUATION ADULT. - ORAL INTAKE PTA
Pt reports good po intake/appetite PTA. Typical meal intake PTA: Breakfast: yogurt. Lunch: Salad or sandwich. Dinner: Fish with vegetable and starch. Pt drinks mostly water. Occasionally drinks glass of wine. Takes vitamin D and MVI supplements PTA. Denies food allergy or intolerance. Dislikes chicken./good

## 2019-04-24 NOTE — DIETITIAN INITIAL EVALUATION ADULT. - OTHER INFO
Pt seen for LOS admission. Pt seen for LOS admission. Pt currently reports poor po intake/appetite at this time, states she is only picking at meals. Pt is supplementing with Ensure x 2 daily, prefers chocolate flavor. Pt also voiced new food preferences, likes fruited yogurt, dislikes chicken. Pt denies GI distress no chewing/swallowing difficulty, no GI distress no N+V, diarrhea or constipation. Pt denies recent weight change states  pounds consistent with admit wt 147 pounds; weight gain of 7 pounds 2/2 post-op fluid retention. Pt new to Coumadin, denies prior formal education on Coumadin-dietary interaction.

## 2019-04-24 NOTE — DIETITIAN INITIAL EVALUATION ADULT. - ADHERENCE
Pt reports consuming a general healthful diet PTA. Cooks mostly herself, does go out to eat on occasion for dinner./good

## 2019-04-24 NOTE — DIETITIAN INITIAL EVALUATION ADULT. - ENERGY NEEDS
ht: 64 inches. wt: 152.3 pounds (current, standing, +1 B/L ankle edema noted). Admit wt 147 pounds 4/16. pre-op BMI: 25.2 kG/m2. UBW:  IBW: 120 pounds +/- 10%. %IBW: 123%  Other pertinent objective information: 69 year old female pt with PMH HLD, MVP/MR, allergy induced Asthma. ECHO revealed moderate to severe MR. Pt had cardiac cath /surgical consult- scheduled for mitral valve repair, possible replacement. Now s/p maze procedure, left atrial appendage closure, MVR. Surgical incision noted. AC on Coumadin.

## 2019-04-25 LAB
ANION GAP SERPL CALC-SCNC: 13 MMOL/L — SIGNIFICANT CHANGE UP (ref 5–17)
APTT BLD: 29.4 SEC — SIGNIFICANT CHANGE UP (ref 27.5–36.3)
BUN SERPL-MCNC: 8 MG/DL — SIGNIFICANT CHANGE UP (ref 7–23)
CALCIUM SERPL-MCNC: 9.9 MG/DL — SIGNIFICANT CHANGE UP (ref 8.4–10.5)
CHLORIDE SERPL-SCNC: 102 MMOL/L — SIGNIFICANT CHANGE UP (ref 96–108)
CO2 SERPL-SCNC: 20 MMOL/L — LOW (ref 22–31)
CREAT SERPL-MCNC: 0.54 MG/DL — SIGNIFICANT CHANGE UP (ref 0.5–1.3)
GLUCOSE SERPL-MCNC: 111 MG/DL — HIGH (ref 70–99)
HCT VFR BLD CALC: 32.2 % — LOW (ref 34.5–45)
HGB BLD-MCNC: 10.5 G/DL — LOW (ref 11.5–15.5)
INR BLD: 1.58 RATIO — HIGH (ref 0.88–1.16)
MAGNESIUM SERPL-MCNC: 2.2 MG/DL — SIGNIFICANT CHANGE UP (ref 1.6–2.6)
MCHC RBC-ENTMCNC: 29.6 PG — SIGNIFICANT CHANGE UP (ref 27–34)
MCHC RBC-ENTMCNC: 32.6 GM/DL — SIGNIFICANT CHANGE UP (ref 32–36)
MCV RBC AUTO: 90.7 FL — SIGNIFICANT CHANGE UP (ref 80–100)
PHOSPHATE SERPL-MCNC: 2.5 MG/DL — SIGNIFICANT CHANGE UP (ref 2.5–4.5)
PLATELET # BLD AUTO: 405 K/UL — HIGH (ref 150–400)
POTASSIUM SERPL-MCNC: 4.3 MMOL/L — SIGNIFICANT CHANGE UP (ref 3.5–5.3)
POTASSIUM SERPL-SCNC: 4.3 MMOL/L — SIGNIFICANT CHANGE UP (ref 3.5–5.3)
PROTHROM AB SERPL-ACNC: 18.4 SEC — HIGH (ref 10–12.9)
RBC # BLD: 3.55 M/UL — LOW (ref 3.8–5.2)
RBC # FLD: 13.1 % — SIGNIFICANT CHANGE UP (ref 10.3–14.5)
SODIUM SERPL-SCNC: 135 MMOL/L — SIGNIFICANT CHANGE UP (ref 135–145)
WBC # BLD: 6.4 K/UL — SIGNIFICANT CHANGE UP (ref 3.8–10.5)
WBC # FLD AUTO: 6.4 K/UL — SIGNIFICANT CHANGE UP (ref 3.8–10.5)

## 2019-04-25 PROCEDURE — 93010 ELECTROCARDIOGRAM REPORT: CPT

## 2019-04-25 RX ORDER — WARFARIN SODIUM 2.5 MG/1
5 TABLET ORAL ONCE
Qty: 0 | Refills: 0 | Status: COMPLETED | OUTPATIENT
Start: 2019-04-25 | End: 2019-04-25

## 2019-04-25 RX ORDER — WARFARIN SODIUM 2.5 MG/1
2.5 TABLET ORAL ONCE
Qty: 0 | Refills: 0 | Status: COMPLETED | OUTPATIENT
Start: 2019-04-25 | End: 2019-04-25

## 2019-04-25 RX ORDER — ALPRAZOLAM 0.25 MG
0.25 TABLET ORAL ONCE
Qty: 0 | Refills: 0 | Status: DISCONTINUED | OUTPATIENT
Start: 2019-04-25 | End: 2019-04-25

## 2019-04-25 RX ADMIN — ATORVASTATIN CALCIUM 20 MILLIGRAM(S): 80 TABLET, FILM COATED ORAL at 21:12

## 2019-04-25 RX ADMIN — CEFTRIAXONE 100 GRAM(S): 500 INJECTION, POWDER, FOR SOLUTION INTRAMUSCULAR; INTRAVENOUS at 01:05

## 2019-04-25 RX ADMIN — ENOXAPARIN SODIUM 40 MILLIGRAM(S): 100 INJECTION SUBCUTANEOUS at 14:10

## 2019-04-25 RX ADMIN — WARFARIN SODIUM 5 MILLIGRAM(S): 2.5 TABLET ORAL at 09:36

## 2019-04-25 RX ADMIN — PANTOPRAZOLE SODIUM 40 MILLIGRAM(S): 20 TABLET, DELAYED RELEASE ORAL at 05:26

## 2019-04-25 RX ADMIN — Medication 0.5 MILLIGRAM(S): at 18:01

## 2019-04-25 RX ADMIN — MIDODRINE HYDROCHLORIDE 5 MILLIGRAM(S): 2.5 TABLET ORAL at 21:12

## 2019-04-25 RX ADMIN — MIDODRINE HYDROCHLORIDE 5 MILLIGRAM(S): 2.5 TABLET ORAL at 14:10

## 2019-04-25 RX ADMIN — MIDODRINE HYDROCHLORIDE 5 MILLIGRAM(S): 2.5 TABLET ORAL at 05:26

## 2019-04-25 RX ADMIN — WARFARIN SODIUM 2.5 MILLIGRAM(S): 2.5 TABLET ORAL at 18:00

## 2019-04-25 RX ADMIN — Medication 81 MILLIGRAM(S): at 14:10

## 2019-04-25 RX ADMIN — Medication 0.25 MILLIGRAM(S): at 22:21

## 2019-04-25 RX ADMIN — Medication 100 MILLIGRAM(S): at 05:26

## 2019-04-25 RX ADMIN — Medication 0.5 MILLIGRAM(S): at 05:26

## 2019-04-25 NOTE — PROGRESS NOTE ADULT - ASSESSMENT
A/P: 69 year old F pt with PMH of MVP/severe MR and HLD s/p bioprosthetic MVR and maze with LA exclusion(4/16), EP consulted for AF RVR.      - pt given 400 mg amio x 3 doses started on 4/23 and one dose of 200 mg on 4/24  - 1 episode of torsades on 4/24 PM, no events since on telemetry with V-pacing  - amiodarone and venlafaxine discontinued since torsades  - pt needs 12 lead EKG today with pacing off to determine QTc    Tirso Ziegler, #[id number]  EP Fellow

## 2019-04-25 NOTE — PROGRESS NOTE ADULT - PROBLEM SELECTOR PLAN 1
+ PW  home plan  coumadin  5  mg today + PW,    Dr warren will remove  home plan  coumadin  5  mg today

## 2019-04-25 NOTE — PROGRESS NOTE ADULT - SUBJECTIVE AND OBJECTIVE BOX
VITAL SIGNS    Telemetry:  acc  junc/SR    Vital Signs Last 24 Hrs  T(C): 37 (19 @ 05:06), Max: 37 (19 @ 14:56)  T(F): 98.6 (19 @ 05:06), Max: 98.6 (19 @ 14:56)  HR: 86 (19 @ 05:06) (63 - 86)  BP: 111/69 (19 @ 05:06) (95/57 - 122/80)  RR: 18 (19 @ 05:06) (18 - 18)  SpO2: 96% (19 @ 05:06) (94% - 98%)                   Daily     Daily Weight in k.7 (2019 09:14)        CAPILLARY BLOOD GLUCOSE          Pacing Wires                                   Isolated     Coumadin  YES                         PHYSICAL EXAM    Neurology: alert and oriented x 3, moves all extremities with no defecits  CV :  RRR  Sternal Wound :  CDI , Stable  with pw  Lungs:   CTA B/L  Abdomen: soft, nontender, nondistended, positive bowel sounds,  Extremities:       trace  le edema  no calf tenderness

## 2019-04-25 NOTE — PROGRESS NOTE ADULT - ASSESSMENT
69  yr old female sp   MVR/KASIE, MAZE  H  post op   Inotropes,  fevers +UTI  on IV  ABX  ,  coumadin dosing  4/22  Trf floor  NSR ,  + PW  coumadin dose 7.5 mg,  dosed this am    ambulating  4/23      amio  load  for KVNG ,   coumadin dosing 5 mg  for  INR   1.57,   +  PW  4/24     inc  amio to 400 BID  per EP  cslt and monitor   coumadin dosing of 7.5 mf   for INR   1.54   ambulating  4/25    EKG  NSR first degree    BP stable  isolated pw    INR    1.58   coumadin 5 mg   off amio and BB with EP following

## 2019-04-25 NOTE — PROGRESS NOTE ADULT - SUBJECTIVE AND OBJECTIVE BOX
Cardiology Progress Note    Interval: No events overnight, pt feeling minimal palpation from pacing but otherwise no chest pain and SOB.    Tele: V-paced    Primary Service HPI:  68 yo F with h/o HLD, MVP/MR, allergy induced Asthma (controlled) had f/u cardiology evaluation. ECHO revealed moderate to severe MR . Pt had cardiac cath /surgical consult- scheduled for mitral valve repair, possible replacement, radio frequency, Maze procedure, left atrial appendage closure on 2019. Pt denies any chest pain/SOB/syncope. (10 Apr 2019 10:14)      Medications:  ALBUTerol    90 MICROgram(s) HFA Inhaler 1 Puff(s) Inhalation every 4 hours  aspirin  chewable 81 milliGRAM(s) Oral daily  atorvastatin 20 milliGRAM(s) Oral at bedtime  buDESOnide   0.5 milliGRAM(s) Respule 0.5 milliGRAM(s) Inhalation two times a day  cefTRIAXone   IVPB      cefTRIAXone   IVPB 1 Gram(s) IV Intermittent every 24 hours  docusate sodium 100 milliGRAM(s) Oral three times a day  enoxaparin Injectable 40 milliGRAM(s) SubCutaneous daily  melatonin 5 milliGRAM(s) Oral at bedtime  midodrine 5 milliGRAM(s) Oral every 8 hours  pantoprazole    Tablet 40 milliGRAM(s) Oral before breakfast  tiotropium 18 MICROgram(s) Capsule 1 Capsule(s) Inhalation daily      Review of Systems:  Constitutional: [ ] Fever [ ] Chills [ ] Fatigue [ ] Weight change   HEENT: [ ] Blurred vision [ ] Eye Pain [ ] Headache [ ] Runny nose [ ] Sore Throat   Respiratory: [ ] Cough [ ] Wheezing [ ] Shortness of breath  Cardiovascular: [ ] Chest Pain [ ] Palpitations [ ] GAYTAN [ ] PND [ ] Orthopnea  Gastrointestinal: [ ] Abdominal Pain [ ] Diarrhea [ ] Constipation [ ] Hemorrhoids [ ] Nausea [ ] Vomiting  Genitourinary: [ ] Nocturia [ ] Dysuria [ ] Incontinence  Extremities: [ ] Swelling [ ] Joint Pain  Neurologic: [ ] Focal deficit [ ] Paresthesias [ ] Syncope  Lymphatic: [ ] Swelling [ ] Lymphadenopathy   Skin: [ ] Rash [ ] Ecchymoses [ ] Wounds [ ] Lesions  Psychiatry: [ ] Depression [ ] Suicidal/Homicidal Ideation [ ] Anxiety [ ] Sleep Disturbances  [ ] 10 point review of systems is otherwise negative except as mentioned above            [ ]Unable to obtain    Vitals:  T(C): 37 (19 @ 05:06), Max: 37 (19 @ 14:56)  HR: 86 (19 @ 05:06) (63 - 86)  BP: 111/69 (19 @ 05:06) (95/57 - 122/80)  BP(mean): --  RR: 18 (19 @ 05:06) (18 - 18)  SpO2: 96% (19 @ 05:06) (94% - 98%)  Wt(kg): --  Daily     Daily Weight in k.7 (2019 09:14)  I&O's Summary    2019 07:  -  2019 07:00  --------------------------------------------------------  IN: 1040 mL / OUT: 1300 mL / NET: -260 mL    2019 07:01  -  2019 13:29  --------------------------------------------------------  IN: 240 mL / OUT: 0 mL / NET: 240 mL        Physical Exam:  General: NAD  Eye: PERRL, EOMI  HENT: Normal oral mucosa NC/AT  CV: Normal S1/S2, RRR, No M/R/G, no edema, no elevation in JVP  Resp: Normal respiratory effort, clear to auscultation bilaterally, no wheezing, no crackles  Abd: Soft, Non-tender, Non-distended, BS+  Ext: No clubbing, No joint deformity   Neuro: Non-focal, motor and sensory intact  Lymph: No lymphadenopathy  Psych: AAOx3, Mood & affect appropriate  Skin: No rashes, No ecchymoses, No cyanosis    Labs:                        10.5   6.4   )-----------( 405      ( 2019 06:33 )             32.2         135  |  102  |  8   ----------------------------<  111<H>  4.3   |  20<L>  |  0.54    Ca    9.9      2019 06:33  Phos  2.5       Mg     2.2           PT/INR - ( 2019 06:34 )   PT: 18.4 sec;   INR: 1.58 ratio         PTT - ( 2019 06:34 )  PTT:29.4 sec              New results/imaging:

## 2019-04-26 DIAGNOSIS — I49.8 OTHER SPECIFIED CARDIAC ARRHYTHMIAS: ICD-10-CM

## 2019-04-26 DIAGNOSIS — Z95.2 PRESENCE OF PROSTHETIC HEART VALVE: ICD-10-CM

## 2019-04-26 LAB
ANION GAP SERPL CALC-SCNC: 13 MMOL/L — SIGNIFICANT CHANGE UP (ref 5–17)
APTT BLD: 29.9 SEC — SIGNIFICANT CHANGE UP (ref 27.5–36.3)
BUN SERPL-MCNC: 8 MG/DL — SIGNIFICANT CHANGE UP (ref 7–23)
CALCIUM SERPL-MCNC: 9.7 MG/DL — SIGNIFICANT CHANGE UP (ref 8.4–10.5)
CHLORIDE SERPL-SCNC: 104 MMOL/L — SIGNIFICANT CHANGE UP (ref 96–108)
CO2 SERPL-SCNC: 18 MMOL/L — LOW (ref 22–31)
CREAT SERPL-MCNC: 0.47 MG/DL — LOW (ref 0.5–1.3)
GLUCOSE SERPL-MCNC: 104 MG/DL — HIGH (ref 70–99)
HCT VFR BLD CALC: 32.9 % — LOW (ref 34.5–45)
HGB BLD-MCNC: 10.6 G/DL — LOW (ref 11.5–15.5)
INR BLD: 1.66 RATIO — HIGH (ref 0.88–1.16)
MAGNESIUM SERPL-MCNC: 2.1 MG/DL — SIGNIFICANT CHANGE UP (ref 1.6–2.6)
MCHC RBC-ENTMCNC: 29.4 PG — SIGNIFICANT CHANGE UP (ref 27–34)
MCHC RBC-ENTMCNC: 32.1 GM/DL — SIGNIFICANT CHANGE UP (ref 32–36)
MCV RBC AUTO: 91.7 FL — SIGNIFICANT CHANGE UP (ref 80–100)
PHOSPHATE SERPL-MCNC: 2.5 MG/DL — SIGNIFICANT CHANGE UP (ref 2.5–4.5)
PLATELET # BLD AUTO: 393 K/UL — SIGNIFICANT CHANGE UP (ref 150–400)
POTASSIUM SERPL-MCNC: 4.3 MMOL/L — SIGNIFICANT CHANGE UP (ref 3.5–5.3)
POTASSIUM SERPL-SCNC: 4.3 MMOL/L — SIGNIFICANT CHANGE UP (ref 3.5–5.3)
PROTHROM AB SERPL-ACNC: 19.2 SEC — HIGH (ref 10–12.9)
RBC # BLD: 3.59 M/UL — LOW (ref 3.8–5.2)
RBC # FLD: 13.4 % — SIGNIFICANT CHANGE UP (ref 10.3–14.5)
SODIUM SERPL-SCNC: 135 MMOL/L — SIGNIFICANT CHANGE UP (ref 135–145)
WBC # BLD: 7.7 K/UL — SIGNIFICANT CHANGE UP (ref 3.8–10.5)
WBC # FLD AUTO: 7.7 K/UL — SIGNIFICANT CHANGE UP (ref 3.8–10.5)

## 2019-04-26 PROCEDURE — 93010 ELECTROCARDIOGRAM REPORT: CPT

## 2019-04-26 RX ORDER — METOPROLOL TARTRATE 50 MG
12.5 TABLET ORAL
Qty: 0 | Refills: 0 | Status: DISCONTINUED | OUTPATIENT
Start: 2019-04-26 | End: 2019-04-27

## 2019-04-26 RX ORDER — ALPRAZOLAM 0.25 MG
0.25 TABLET ORAL ONCE
Qty: 0 | Refills: 0 | Status: DISCONTINUED | OUTPATIENT
Start: 2019-04-26 | End: 2019-04-26

## 2019-04-26 RX ORDER — WARFARIN SODIUM 2.5 MG/1
7.5 TABLET ORAL ONCE
Qty: 0 | Refills: 0 | Status: COMPLETED | OUTPATIENT
Start: 2019-04-26 | End: 2019-04-26

## 2019-04-26 RX ADMIN — Medication 12.5 MILLIGRAM(S): at 21:29

## 2019-04-26 RX ADMIN — WARFARIN SODIUM 7.5 MILLIGRAM(S): 2.5 TABLET ORAL at 14:08

## 2019-04-26 RX ADMIN — Medication 0.5 MILLIGRAM(S): at 19:00

## 2019-04-26 RX ADMIN — ATORVASTATIN CALCIUM 20 MILLIGRAM(S): 80 TABLET, FILM COATED ORAL at 21:29

## 2019-04-26 RX ADMIN — Medication 12.5 MILLIGRAM(S): at 11:39

## 2019-04-26 RX ADMIN — Medication 0.5 MILLIGRAM(S): at 05:35

## 2019-04-26 RX ADMIN — PANTOPRAZOLE SODIUM 40 MILLIGRAM(S): 20 TABLET, DELAYED RELEASE ORAL at 05:35

## 2019-04-26 RX ADMIN — MIDODRINE HYDROCHLORIDE 5 MILLIGRAM(S): 2.5 TABLET ORAL at 05:35

## 2019-04-26 RX ADMIN — ENOXAPARIN SODIUM 40 MILLIGRAM(S): 100 INJECTION SUBCUTANEOUS at 11:40

## 2019-04-26 RX ADMIN — CEFTRIAXONE 100 GRAM(S): 500 INJECTION, POWDER, FOR SOLUTION INTRAMUSCULAR; INTRAVENOUS at 01:18

## 2019-04-26 RX ADMIN — Medication 0.25 MILLIGRAM(S): at 23:21

## 2019-04-26 RX ADMIN — MIDODRINE HYDROCHLORIDE 5 MILLIGRAM(S): 2.5 TABLET ORAL at 21:29

## 2019-04-26 RX ADMIN — Medication 81 MILLIGRAM(S): at 11:40

## 2019-04-26 RX ADMIN — MIDODRINE HYDROCHLORIDE 5 MILLIGRAM(S): 2.5 TABLET ORAL at 13:59

## 2019-04-26 NOTE — PROGRESS NOTE ADULT - PROBLEM SELECTOR PLAN 3
amio  hold for torsdades,   Hold BB for junctional  rhythm   EP following EPS following  pt converted to RSR 90's  daily EKG  initiate lopressor 12.5 mg po bid as per EP today

## 2019-04-26 NOTE — PROGRESS NOTE ADULT - ASSESSMENT
69  yr old female sp   MVR/KASIE, MAZE  H  post op   Inotropes,  fevers +UTI  on IV  ABX  ,  coumadin dosing  4/22  Trf floor  NSR ,  + PW  coumadin dose 7.5 mg,  dosed this am    ambulating  4/23      amio  load  for KVNG ,   coumadin dosing 5 mg  for  INR   1.57,   +  PW  4/24     inc  amio to 400 BID  per EP  cslt and monitor   coumadin dosing of 7.5 mf   for INR   1.54   ambulating  4/25    EKG  NSR first degree    BP stable  isolated pw    INR    1.58   coumadin 5 mg   off amio and BB with EP following 69  yr old female sp   MVR/KASIE, MAZE  H  post op   Inotropes,  fevers +UTI  on IV  ABX  ,  coumadin dosing  4/22  Trf floor  NSR ,  + PW  coumadin dose 7.5 mg,  dosed this am    ambulating  4/23      amio  load  for KVNG ,   coumadin dosing 5 mg  for  INR   1.57,   +  PW  4/24     inc  amio to 400 BID  per EP  cslt and monitor   coumadin dosing of 7.5 mf   for INR   1.54   ambulating  4/25    EKG  NSR first degree    BP stable  isolated pw    INR    1.58   coumadin 5 mg   off amio and BB with EP following  4/26 VSS; INR 1.6- coumadin 7.5 mg this am; initiate low dose bb as per EPS today- lopressor 12. 5 mg po bid and observe HR  Discharge planning- home when HR and INR stable

## 2019-04-26 NOTE — PROGRESS NOTE ADULT - ASSESSMENT
A/P: 69 year old F pt with PMH of MVP/severe MR and HLD s/p bioprosthetic MVR and maze with LA exclusion(4/16), EP consulted for AF RVR.    - pt given 400 mg amio x 3 doses started on 4/23 and one dose of 200 mg on 4/24  - 1 episode of torsades on 4/24 PM, no events since on telemetry now off of pacing  - amiodarone and venlafaxine discontinued since torsades  - 12 lead EKG today showing QTc approx 510-515 msec  - would hold off of BB considering it would further prolong QT    Tirso Ziegler, #[id number]  EP Fellow

## 2019-04-26 NOTE — PROGRESS NOTE ADULT - SUBJECTIVE AND OBJECTIVE BOX
VITAL SIGNS    Telemetry:    Vital Signs Last 24 Hrs  T(C): 36.8 (19 @ 11:00), Max: 36.8 (19 @ 20:00)  T(F): 98.2 (19 @ 11:00), Max: 98.2 (19 @ 20:00)  HR: 100 (19 @ 11:00) (66 - 100)  BP: 105/67 (19 @ 11:00) (105/67 - 125/59)  RR: 18 (19 @ 11:00) (18 - 18)  SpO2: 93% (19 @ 11:00) (93% - 96%)             @ 07:01  -   @ 07:00  --------------------------------------------------------  IN: 1280 mL / OUT: 1550 mL / NET: -270 mL       Daily     Daily Weight in k.4 (2019 08:41)  Admit Wt: Drug Dosing Weight  Height (cm): 162.56 (2019 08:31)  Weight (kg): 66.7 (2019 08:31)  BMI (kg/m2): 25.2 (2019 08:31)  BSA (m2): 1.72 (2019 08:31)      CAPILLARY BLOOD GLUCOSE              ALBUTerol    90 MICROgram(s) HFA Inhaler 1 Puff(s) Inhalation every 4 hours  aspirin  chewable 81 milliGRAM(s) Oral daily  atorvastatin 20 milliGRAM(s) Oral at bedtime  buDESOnide   0.5 milliGRAM(s) Respule 0.5 milliGRAM(s) Inhalation two times a day  cefTRIAXone   IVPB      cefTRIAXone   IVPB 1 Gram(s) IV Intermittent every 24 hours  docusate sodium 100 milliGRAM(s) Oral three times a day  enoxaparin Injectable 40 milliGRAM(s) SubCutaneous daily  melatonin 5 milliGRAM(s) Oral at bedtime  metoprolol tartrate 12.5 milliGRAM(s) Oral two times a day  midodrine 5 milliGRAM(s) Oral every 8 hours  pantoprazole    Tablet 40 milliGRAM(s) Oral before breakfast  tiotropium 18 MICROgram(s) Capsule 1 Capsule(s) Inhalation daily  warfarin 7.5 milliGRAM(s) Oral once      PHYSICAL EXAM    Subjective: "Hi.   Neurology: alert and oriented x 3, nonfocal, no gross deficits  CV : tele:  RSR  Sternal Wound :  CDI with dressing , Stable  Lungs: clear. RR easy, unlabored   Abdomen: soft, nontender, nondistended, positive bowel sounds, bowel movement   Neg N/V/D   :  pt voiding without difficulty   Extremities:   WORTHY; edema, neg calf tenderness.   PPP bilaterally      PW:  Chest tubes: VITAL SIGNS    Telemetry:  acc junct- converted to RSR 90's at 0200    Vital Signs Last 24 Hrs  T(C): 36.8 (19 @ 11:00), Max: 36.8 (19 @ 20:00)  T(F): 98.2 (19 @ 11:00), Max: 98.2 (19 @ 20:00)  HR: 100 (19 @ 11:00) (66 - 100)  BP: 105/67 (19 @ 11:00) (105/67 - 125/59)  RR: 18 (19 @ 11:00) (18 - 18)  SpO2: 93% (19 @ 11:00) (93% - 96%)             @ 07:01  -   @ 07:00  --------------------------------------------------------  IN: 1280 mL / OUT: 1550 mL / NET: -270 mL       Daily     Daily Weight in k.4 (2019 08:41)  Admit Wt: Drug Dosing Weight  Height (cm): 162.56 (2019 08:31)  Weight (kg): 66.7 (2019 08:31)  BMI (kg/m2): 25.2 (2019 08:31)  BSA (m2): 1.72 (2019 08:31)      CAPILLARY BLOOD GLUCOSE              ALBUTerol    90 MICROgram(s) HFA Inhaler 1 Puff(s) Inhalation every 4 hours  aspirin  chewable 81 milliGRAM(s) Oral daily  atorvastatin 20 milliGRAM(s) Oral at bedtime  buDESOnide   0.5 milliGRAM(s) Respule 0.5 milliGRAM(s) Inhalation two times a day  cefTRIAXone   IVPB      cefTRIAXone   IVPB 1 Gram(s) IV Intermittent every 24 hours  docusate sodium 100 milliGRAM(s) Oral three times a day  enoxaparin Injectable 40 milliGRAM(s) SubCutaneous daily  melatonin 5 milliGRAM(s) Oral at bedtime  metoprolol tartrate 12.5 milliGRAM(s) Oral two times a day  midodrine 5 milliGRAM(s) Oral every 8 hours  pantoprazole    Tablet 40 milliGRAM(s) Oral before breakfast  tiotropium 18 MICROgram(s) Capsule 1 Capsule(s) Inhalation daily  warfarin 7.5 milliGRAM(s) Oral once      PHYSICAL EXAM    Subjective: "I feel ok."   Neurology: alert and oriented x 3, nonfocal, no gross deficits  CV : tele:  acc junct- converted to RSR 90's at 0200    Sternal Wound :  CDI DESTINEE - sternum stable; pw d/c this am   Lungs: clear. RR easy, unlabored   Abdomen: soft, nontender, nondistended, positive bowel sounds, + bowel movement   Neg N/V/D   :  pt voiding without difficulty   Extremities:   WORTHY; neg LE edema, neg calf tenderness.   PPP bilaterally      PW: pw d/c this am   Chest tubes: none

## 2019-04-26 NOTE — PROGRESS NOTE ADULT - PROBLEM SELECTOR PLAN 1
+ PW,    Dr warren will remove  home plan  coumadin  5  mg today continue IV ABX as per ID  daily cbc  pt afebrile

## 2019-04-26 NOTE — PROGRESS NOTE ADULT - SUBJECTIVE AND OBJECTIVE BOX
Cardiology Progress Note    Interval: No acute events overnight off of epicardial wires, denied chest pain and palpitations    Tele: Sinus 80's    Primary Service HPI:  70 yo F with h/o HLD, MVP/MR, allergy induced Asthma (controlled) had f/u cardiology evaluation. ECHO revealed moderate to severe MR . Pt had cardiac cath /surgical consult- scheduled for mitral valve repair, possible replacement, radio frequency, Maze procedure, left atrial appendage closure on 2019. Pt denies any chest pain/SOB/syncope. (10 Apr 2019 10:14)      Medications:  ALBUTerol    90 MICROgram(s) HFA Inhaler 1 Puff(s) Inhalation every 4 hours  aspirin  chewable 81 milliGRAM(s) Oral daily  atorvastatin 20 milliGRAM(s) Oral at bedtime  buDESOnide   0.5 milliGRAM(s) Respule 0.5 milliGRAM(s) Inhalation two times a day  cefTRIAXone   IVPB      cefTRIAXone   IVPB 1 Gram(s) IV Intermittent every 24 hours  docusate sodium 100 milliGRAM(s) Oral three times a day  enoxaparin Injectable 40 milliGRAM(s) SubCutaneous daily  melatonin 5 milliGRAM(s) Oral at bedtime  midodrine 5 milliGRAM(s) Oral every 8 hours  pantoprazole    Tablet 40 milliGRAM(s) Oral before breakfast  tiotropium 18 MICROgram(s) Capsule 1 Capsule(s) Inhalation daily  warfarin 7.5 milliGRAM(s) Oral once      Review of Systems:  Constitutional: [ ] Fever [ ] Chills [ ] Fatigue [ ] Weight change   HEENT: [ ] Blurred vision [ ] Eye Pain [ ] Headache [ ] Runny nose [ ] Sore Throat   Respiratory: [ ] Cough [ ] Wheezing [ ] Shortness of breath  Cardiovascular: [ ] Chest Pain [ ] Palpitations [ ] GAYTAN [ ] PND [ ] Orthopnea  Gastrointestinal: [ ] Abdominal Pain [ ] Diarrhea [ ] Constipation [ ] Hemorrhoids [ ] Nausea [ ] Vomiting  Genitourinary: [ ] Nocturia [ ] Dysuria [ ] Incontinence  Extremities: [ ] Swelling [ ] Joint Pain  Neurologic: [ ] Focal deficit [ ] Paresthesias [ ] Syncope  Lymphatic: [ ] Swelling [ ] Lymphadenopathy   Skin: [ ] Rash [ ] Ecchymoses [ ] Wounds [ ] Lesions  Psychiatry: [ ] Depression [ ] Suicidal/Homicidal Ideation [ ] Anxiety [ ] Sleep Disturbances  [ ] 10 point review of systems is otherwise negative except as mentioned above            [ ]Unable to obtain    Vitals:  T(C): 36.4 (19 @ 05:00), Max: 36.8 (19 @ 20:00)  HR: 97 (19 @ 05:00) (66 - 97)  BP: 118/75 (19 @ 05:00) (110/70 - 125/59)  BP(mean): --  RR: 18 (19 @ 05:00) (18 - 18)  SpO2: 96% (19 @ 05:00) (95% - 96%)  Wt(kg): --  Daily     Daily Weight in k.4 (2019 08:41)  I&O's Summary    2019 07:01  -  2019 07:00  --------------------------------------------------------  IN: 1280 mL / OUT: 1550 mL / NET: -270 mL        Physical Exam:  General: NAD  Eye: PERRL, EOMI  HENT: Normal oral mucosa NC/AT  CV: Normal S1/S2, RRR, No M/R/G, no edema, no elevation in JVP  Resp: Normal respiratory effort, clear to auscultation bilaterally, no wheezing, no crackles  Abd: Soft, Non-tender, Non-distended, BS+  Ext: No clubbing, No joint deformity   Neuro: Non-focal, motor and sensory intact  Lymph: No lymphadenopathy  Psych: AAOx3, Mood & affect appropriate  Skin: No rashes, No ecchymoses, No cyanosis    Labs:                        10.6   7.7   )-----------( 393      ( 2019 06:20 )             32.9         135  |  104  |  8   ----------------------------<  104<H>  4.3   |  18<L>  |  0.47<L>    Ca    9.7      2019 06:20  Phos  2.5     04-26  Mg     2.1           PT/INR - ( 2019 06:20 )   PT: 19.2 sec;   INR: 1.66 ratio         PTT - ( 2019 06:20 )  PTT:29.9 sec              New results/imaging:

## 2019-04-26 NOTE — PROGRESS NOTE ADULT - PROBLEM SELECTOR PLAN 2
IV  abx continue postop care  anticoagulation for mitral valve  initiate low dose b- blockers- lopressor 12.5 mg po bid as per EP today   pw d/c this am by Dr. Bergman   Discharge planning- home when HR and INR stable

## 2019-04-27 LAB
ANION GAP SERPL CALC-SCNC: 13 MMOL/L — SIGNIFICANT CHANGE UP (ref 5–17)
BUN SERPL-MCNC: 9 MG/DL — SIGNIFICANT CHANGE UP (ref 7–23)
CALCIUM SERPL-MCNC: 9.8 MG/DL — SIGNIFICANT CHANGE UP (ref 8.4–10.5)
CHLORIDE SERPL-SCNC: 103 MMOL/L — SIGNIFICANT CHANGE UP (ref 96–108)
CO2 SERPL-SCNC: 22 MMOL/L — SIGNIFICANT CHANGE UP (ref 22–31)
CREAT SERPL-MCNC: 0.56 MG/DL — SIGNIFICANT CHANGE UP (ref 0.5–1.3)
GLUCOSE SERPL-MCNC: 117 MG/DL — HIGH (ref 70–99)
HCT VFR BLD CALC: 30.5 % — LOW (ref 34.5–45)
HGB BLD-MCNC: 10.5 G/DL — LOW (ref 11.5–15.5)
INR BLD: 1.76 RATIO — HIGH (ref 0.88–1.16)
MAGNESIUM SERPL-MCNC: 2.1 MG/DL — SIGNIFICANT CHANGE UP (ref 1.6–2.6)
MCHC RBC-ENTMCNC: 31.2 PG — SIGNIFICANT CHANGE UP (ref 27–34)
MCHC RBC-ENTMCNC: 34.5 GM/DL — SIGNIFICANT CHANGE UP (ref 32–36)
MCV RBC AUTO: 90.5 FL — SIGNIFICANT CHANGE UP (ref 80–100)
PHOSPHATE SERPL-MCNC: 3.3 MG/DL — SIGNIFICANT CHANGE UP (ref 2.5–4.5)
PLATELET # BLD AUTO: 456 K/UL — HIGH (ref 150–400)
POTASSIUM SERPL-MCNC: 4.1 MMOL/L — SIGNIFICANT CHANGE UP (ref 3.5–5.3)
POTASSIUM SERPL-SCNC: 4.1 MMOL/L — SIGNIFICANT CHANGE UP (ref 3.5–5.3)
PROTHROM AB SERPL-ACNC: 20.4 SEC — HIGH (ref 10–12.9)
RBC # BLD: 3.37 M/UL — LOW (ref 3.8–5.2)
RBC # FLD: 13.1 % — SIGNIFICANT CHANGE UP (ref 10.3–14.5)
SODIUM SERPL-SCNC: 138 MMOL/L — SIGNIFICANT CHANGE UP (ref 135–145)
WBC # BLD: 8.8 K/UL — SIGNIFICANT CHANGE UP (ref 3.8–10.5)
WBC # FLD AUTO: 8.8 K/UL — SIGNIFICANT CHANGE UP (ref 3.8–10.5)

## 2019-04-27 PROCEDURE — 93010 ELECTROCARDIOGRAM REPORT: CPT

## 2019-04-27 PROCEDURE — 71046 X-RAY EXAM CHEST 2 VIEWS: CPT | Mod: 26

## 2019-04-27 RX ORDER — WARFARIN SODIUM 2.5 MG/1
7.5 TABLET ORAL ONCE
Qty: 0 | Refills: 0 | Status: COMPLETED | OUTPATIENT
Start: 2019-04-27 | End: 2019-04-27

## 2019-04-27 RX ORDER — ALPRAZOLAM 0.25 MG
0.25 TABLET ORAL EVERY 8 HOURS
Qty: 0 | Refills: 0 | Status: DISCONTINUED | OUTPATIENT
Start: 2019-04-27 | End: 2019-04-29

## 2019-04-27 RX ORDER — METOPROLOL TARTRATE 50 MG
12.5 TABLET ORAL THREE TIMES A DAY
Qty: 0 | Refills: 0 | Status: DISCONTINUED | OUTPATIENT
Start: 2019-04-27 | End: 2019-04-27

## 2019-04-27 RX ORDER — METOPROLOL TARTRATE 50 MG
25 TABLET ORAL EVERY 8 HOURS
Qty: 0 | Refills: 0 | Status: DISCONTINUED | OUTPATIENT
Start: 2019-04-27 | End: 2019-04-29

## 2019-04-27 RX ADMIN — Medication 0.5 MILLIGRAM(S): at 17:16

## 2019-04-27 RX ADMIN — PANTOPRAZOLE SODIUM 40 MILLIGRAM(S): 20 TABLET, DELAYED RELEASE ORAL at 05:38

## 2019-04-27 RX ADMIN — Medication 0.5 MILLIGRAM(S): at 05:37

## 2019-04-27 RX ADMIN — Medication 81 MILLIGRAM(S): at 12:38

## 2019-04-27 RX ADMIN — MIDODRINE HYDROCHLORIDE 5 MILLIGRAM(S): 2.5 TABLET ORAL at 05:38

## 2019-04-27 RX ADMIN — CEFTRIAXONE 100 GRAM(S): 500 INJECTION, POWDER, FOR SOLUTION INTRAMUSCULAR; INTRAVENOUS at 01:19

## 2019-04-27 RX ADMIN — MIDODRINE HYDROCHLORIDE 5 MILLIGRAM(S): 2.5 TABLET ORAL at 13:10

## 2019-04-27 RX ADMIN — Medication 25 MILLIGRAM(S): at 12:58

## 2019-04-27 RX ADMIN — ENOXAPARIN SODIUM 40 MILLIGRAM(S): 100 INJECTION SUBCUTANEOUS at 12:38

## 2019-04-27 RX ADMIN — MIDODRINE HYDROCHLORIDE 5 MILLIGRAM(S): 2.5 TABLET ORAL at 21:18

## 2019-04-27 RX ADMIN — Medication 0.25 MILLIGRAM(S): at 22:11

## 2019-04-27 RX ADMIN — Medication 25 MILLIGRAM(S): at 21:18

## 2019-04-27 RX ADMIN — Medication 12.5 MILLIGRAM(S): at 05:38

## 2019-04-27 RX ADMIN — ATORVASTATIN CALCIUM 20 MILLIGRAM(S): 80 TABLET, FILM COATED ORAL at 21:18

## 2019-04-27 RX ADMIN — WARFARIN SODIUM 7.5 MILLIGRAM(S): 2.5 TABLET ORAL at 10:55

## 2019-04-27 NOTE — PROGRESS NOTE ADULT - PROBLEM SELECTOR PLAN 3
EPS following  pt converted to RSR 90's  daily EKG  initiate lopressor 12.5 mg po bid as per EP today

## 2019-04-27 NOTE — PROGRESS NOTE ADULT - ASSESSMENT
69 year old F pt with PMH of MVP/severe MR and HLD s/p bioprosthetic MVR and maze with LA exclusion(4/16), EP consulted for AF RVR.    - Review of telemetry overnight without episode of VT; last episode of torsades on 4/24 PM  - Remains off amiodarone and venlafaxine discontinued since torsades  - QT improved from today, continue to monitor EKG qd for QT/QTc prolongation  - On Metoprolol 25mg tid  - Dose Coumadin for MVR, monitor PT/INR    #[phone]

## 2019-04-27 NOTE — PROGRESS NOTE ADULT - PROBLEM SELECTOR PLAN 1
continue IV ABX as per ID  daily cbc  pt afebrile continue postop care  anticoagulation for mitral valve  initiate low dose b- blockers- lopressor 12.5 mg po bid as per EP today   pw d/c this am by Dr. Bergman   Discharge planning- home when HR and INR stable

## 2019-04-27 NOTE — PROGRESS NOTE ADULT - ASSESSMENT
69  yr old female sp   MVR/KASIE, MAZE  H  post op   Inotropes,  fevers +UTI  on IV  ABX  ,  coumadin dosing  4/22  Trf floor  NSR ,  + PW  coumadin dose 7.5 mg,  dosed this am    ambulating  4/23      amio  load  for KVNG ,   coumadin dosing 5 mg  for  INR   1.57,   +  PW  4/24     inc  amio to 400 BID  per EP  cslt and monitor   coumadin dosing of 7.5 mf   for INR   1.54   ambulating  4/25    EKG  NSR first degree    BP stable  isolated pw    INR    1.58   coumadin 5 mg   off amio and BB with EP following  4/26 VSS; INR 1.6- coumadin 7.5 mg this am; initiate low dose bb as per EPS today- lopressor 12. 5 mg po bid and observe HR  Discharge planning- home when HR and INR stable 69  yr old female sp   MVR/KASIE, MAZE  H  post op   Inotropes,  fevers +UTI  on IV  ABX  ,  coumadin dosing  4/22  Trf floor  NSR ,  + PW  coumadin dose 7.5 mg,  dosed this am    ambulating  4/23      amio  load  for KVNG ,   coumadin dosing 5 mg  for  INR   1.57,   +  PW  4/24     inc  amio to 400 BID  per EP  cslt and monitor   coumadin dosing of 7.5 mf   for INR   1.54   ambulating  4/25    EKG  NSR first degree    BP stable  isolated pw    INR    1.58   coumadin 5 mg   off amio and BB with EP following  4/26 VSS; INR 1.6- coumadin 7.5 mg this am; initiate low dose bb as per EPS today- lopressor 12. 5 mg po bid and observe HR  4/27 VSS; INR 1.7 - coumadin 7.5 mg this evening; increase lopressor 25 mg po q8 as per DR. Bergman; ck pa/lat chest xray;   Discharge planning- home when HR and INR stable monday likely 69  yr old female sp   MVR/KASIE, MAZE  H  post op   Inotropes,  fevers +UTI  on IV  ABX  ,  coumadin dosing  4/22  Trf floor  NSR ,  + PW  coumadin dose 7.5 mg,  dosed this am    ambulating  4/23      amio  load  for KVNG ,   coumadin dosing 5 mg  for  INR   1.57,   +  PW  4/24     inc  amio to 400 BID  per EP  cslt and monitor   coumadin dosing of 7.5 mf   for INR   1.54   ambulating  4/25    EKG  NSR first degree    BP stable  isolated pw    INR    1.58   coumadin 5 mg   off amio and BB with EP following  4/26 VSS; INR 1.6- coumadin 7.5 mg this am; initiate low dose bb as per EPS today- lopressor 12. 5 mg po bid and observe HR  IV abx d/c   4/27 VSS; INR 1.7 - coumadin 7.5 mg this evening; increase lopressor 25 mg po q8 as per DR. Bergman; EKG done- qtc < 600 ; ck pa/lat chest xray;   Discharge planning- home when HR and INR stable monday likely

## 2019-04-27 NOTE — PROGRESS NOTE ADULT - SUBJECTIVE AND OBJECTIVE BOX
24H hour events:   Seen sitting in chair, comfortable without complaints noted; no events overnight; denies sob, dizziness, vision change, chest pain, palpitations, N/V    MEDICATIONS:  aspirin  chewable 81 milliGRAM(s) Oral daily  enoxaparin Injectable 40 milliGRAM(s) SubCutaneous daily  metoprolol tartrate 25 milliGRAM(s) Oral every 8 hours  midodrine 5 milliGRAM(s) Oral every 8 hours  ALBUTerol    90 MICROgram(s) HFA Inhaler 1 Puff(s) Inhalation every 4 hours  buDESOnide   0.5 milliGRAM(s) Respule 0.5 milliGRAM(s) Inhalation two times a day  tiotropium 18 MICROgram(s) Capsule 1 Capsule(s) Inhalation daily  ALPRAZolam 0.25 milliGRAM(s) Oral every 8 hours PRN  melatonin 5 milliGRAM(s) Oral at bedtime  docusate sodium 100 milliGRAM(s) Oral three times a day  pantoprazole    Tablet 40 milliGRAM(s) Oral before breakfast  atorvastatin 20 milliGRAM(s) Oral at bedtime      PHYSICAL EXAM:  T(C): 36.6 (04-27-19 @ 12:55), Max: 37 (04-27-19 @ 05:00)  HR: 73 (04-27-19 @ 12:55) (73 - 102)  BP: 96/52 (04-27-19 @ 13:00) (91/54 - 117/79)  RR: 17 (04-27-19 @ 12:55) (17 - 18)  SpO2: 95% (04-27-19 @ 12:55) (95% - 96%)  Wt(kg): --  I&O's Summary    26 Apr 2019 07:01  -  27 Apr 2019 07:00  --------------------------------------------------------  IN: 2270 mL / OUT: 2375 mL / NET: -105 mL    27 Apr 2019 07:01  -  27 Apr 2019 13:43  --------------------------------------------------------  IN: 0 mL / OUT: 600 mL / NET: -600 mL        Appearance: Normal, in NAD  HEENT: Normocephalic, atraumatic, neck supple, normal oral mucosa	  Cardiovascular: Normal S1 S2, No JVD, No murmurs, No edema  Respiratory: Lungs clear to auscultation	  Psychiatry: A & O x 3, Mood & affect appropriate  Gastrointestinal:  Soft, Non-tender, + BS	  Skin: No rashes, No ecchymoses, No cyanosis	  Neurologic: Non-focal  Extremities: Normal range of motion, No clubbing, cyanosis or edema  Vascular: Peripheral pulses palpable 2+ bilaterally        LABS:	 	    CBC Full  -  ( 27 Apr 2019 09:39 )  WBC Count : 8.8 K/uL  Hemoglobin : 10.5 g/dL  Hematocrit : 30.5 %  Platelet Count - Automated : 456 K/uL  Mean Cell Volume : 90.5 fl  Mean Cell Hemoglobin : 31.2 pg  Mean Cell Hemoglobin Concentration : 34.5 gm/dL  Auto Neutrophil # : x  Auto Lymphocyte # : x  Auto Monocyte # : x  Auto Eosinophil # : x  Auto Basophil # : x  Auto Neutrophil % : x  Auto Lymphocyte % : x  Auto Monocyte % : x  Auto Eosinophil % : x  Auto Basophil % : x    04-27    138  |  103  |  9   ----------------------------<  117<H>  4.1   |  22  |  0.56  04-26    135  |  104  |  8   ----------------------------<  104<H>  4.3   |  18<L>  |  0.47<L>    Ca    9.8      27 Apr 2019 09:39  Ca    9.7      26 Apr 2019 06:20  Phos  3.3     04-27  Phos  2.5     04-26  Mg     2.1     04-27  Mg     2.1     04-26      TELEMETRY: NSR 's, occ APC, PVC  	    ECG: NSR at 98 bpm, PVC, AK 138ms, QRS 74ms, QT 378ms/ QTc 482ms    Study Date: 4/18/2019    PROCEDURE: Transthoracic echocardiogram with 2-D, M-Mode  and complete spectral and color flow Doppler. Verbal  consent was obtained for injection of  Ultrasonic Enhancing  Agent following a discussion of risks and benefits.  Following intravenous injection of Ultrasonic Enhancing  Agent , harmonic imaging was performed.  INDICATION: Presence of prosthetic heart valve (Z95.2)  ------------------------------------------------------------------------  Dimensions:    Normal Values:  LA:     5.1    2.0 - 4.0 cm  Ao:     3.0    2.0 - 3.8 cm  SEPTUM: 0.9    0.6 - 1.2 cm  PWT:    1.0    0.6 - 1.1 cm  LVIDd:  4.1  3.0 - 5.6 cm  LVIDs:         1.8 - 4.0 cm  Derived variables:  LVMI: 71 g/m2  RWT: 0.48  Doppler Peak Velocity (m/sec): AoV=1.4  ------------------------------------------------------------------------  Conclusions:  1. Severely dilated left atrium.  LA volume index = 51  cc/m2.  2. Endocardial visualization enhanced with intravenous  injection of Ultrasonic Enhancing Agent (Definity). Septal  flattening consistent with right ventricular overload.  Overall preserved left ventricular ejection fraction.  3. Right ventricular enlargement with decreasedright  ventricular systolic function.  4. No pericardial effusion seen.  *** No previous Echo exam.  ------------------------------------------------------------------------  Confirmed on  4/18/2019 - 16:59:50 by Funmilayo Stout M.D.  ------------------------------------------------------------------------  >end of copied text<

## 2019-04-27 NOTE — PROGRESS NOTE ADULT - PROBLEM SELECTOR PLAN 2
continue postop care  anticoagulation for mitral valve  initiate low dose b- blockers- lopressor 12.5 mg po bid as per EP today   pw d/c this am by Dr. Bergman   Discharge planning- home when HR and INR stable resolved at this time   EPS following  pt converted to RSR 90- 100   daily EKG  increase lopressor 25 mg po q8 as per Dr. Bergman

## 2019-04-27 NOTE — PROGRESS NOTE ADULT - SUBJECTIVE AND OBJECTIVE BOX
VITAL SIGNS    Telemetry:    Vital Signs Last 24 Hrs  T(C): 37 (19 @ 05:00), Max: 37 (19 @ 05:00)  T(F): 98.6 (19 @ 05:00), Max: 98.6 (19 @ 05:00)  HR: 98 (19 @ 05:00) (98 - 102)  BP: 105/66 (19 @ 05:00) (105/66 - 117/79)  RR: 18 (19 @ 05:00) (17 - 18)  SpO2: 96% (19 @ 05:00) (93% - 96%)             @ 07:  -   @ 07:00  --------------------------------------------------------  IN: 2270 mL / OUT: 2375 mL / NET: -105 mL     @ 07:01  -   @ 10:46  --------------------------------------------------------  IN: 0 mL / OUT: 600 mL / NET: -600 mL       Daily     Daily Weight in k.3 (2019 07:39)  Admit Wt: Drug Dosing Weight  Height (cm): 162.56 (2019 08:31)  Weight (kg): 66.7 (2019 08:31)  BMI (kg/m2): 25.2 (2019 08:31)  BSA (m2): 1.72 (2019 08:31)      CAPILLARY BLOOD GLUCOSE              ALBUTerol    90 MICROgram(s) HFA Inhaler 1 Puff(s) Inhalation every 4 hours  ALPRAZolam 0.25 milliGRAM(s) Oral every 8 hours PRN  aspirin  chewable 81 milliGRAM(s) Oral daily  atorvastatin 20 milliGRAM(s) Oral at bedtime  buDESOnide   0.5 milliGRAM(s) Respule 0.5 milliGRAM(s) Inhalation two times a day  docusate sodium 100 milliGRAM(s) Oral three times a day  enoxaparin Injectable 40 milliGRAM(s) SubCutaneous daily  melatonin 5 milliGRAM(s) Oral at bedtime  metoprolol tartrate 12.5 milliGRAM(s) Oral two times a day  midodrine 5 milliGRAM(s) Oral every 8 hours  pantoprazole    Tablet 40 milliGRAM(s) Oral before breakfast  tiotropium 18 MICROgram(s) Capsule 1 Capsule(s) Inhalation daily  warfarin 7.5 milliGRAM(s) Oral once      PHYSICAL EXAM    Subjective: "Hi.   Neurology: alert and oriented x 3, nonfocal, no gross deficits  CV : tele:  RSR  Sternal Wound :  CDI with dressing , Stable  Lungs: clear. RR easy, unlabored   Abdomen: soft, nontender, nondistended, positive bowel sounds, bowel movement   Neg N/V/D   :  pt voiding without difficulty   Extremities:   OWRTHY; edema, neg calf tenderness.   PPP bilaterally      PW:  Chest tubes: VITAL SIGNS    Telemetry:  rsr    Vital Signs Last 24 Hrs  T(C): 37 (19 @ 05:00), Max: 37 (19 @ 05:00)  T(F): 98.6 (19 @ 05:00), Max: 98.6 (19 @ 05:00)  HR: 98 (19 @ 05:00) (98 - 102)  BP: 105/66 (19 @ 05:00) (105/66 - 117/79)  RR: 18 (19 @ 05:00) (17 - 18)  SpO2: 96% (19 @ 05:00) (93% - 96%)             @ 07:01  -   @ 07:00  --------------------------------------------------------  IN: 2270 mL / OUT: 2375 mL / NET: -105 mL     @ 07:01  -   @ 10:46  --------------------------------------------------------  IN: 0 mL / OUT: 600 mL / NET: -600 mL       Daily     Daily Weight in k.3 (2019 07:39)  Admit Wt: Drug Dosing Weight  Height (cm): 162.56 (2019 08:31)  Weight (kg): 66.7 (2019 08:31)  BMI (kg/m2): 25.2 (2019 08:31)  BSA (m2): 1.72 (2019 08:31)      CAPILLARY BLOOD GLUCOSE              ALBUTerol    90 MICROgram(s) HFA Inhaler 1 Puff(s) Inhalation every 4 hours  ALPRAZolam 0.25 milliGRAM(s) Oral every 8 hours PRN  aspirin  chewable 81 milliGRAM(s) Oral daily  atorvastatin 20 milliGRAM(s) Oral at bedtime  buDESOnide   0.5 milliGRAM(s) Respule 0.5 milliGRAM(s) Inhalation two times a day  docusate sodium 100 milliGRAM(s) Oral three times a day  enoxaparin Injectable 40 milliGRAM(s) SubCutaneous daily  melatonin 5 milliGRAM(s) Oral at bedtime  metoprolol tartrate 12.5 milliGRAM(s) Oral two times a day  midodrine 5 milliGRAM(s) Oral every 8 hours  pantoprazole    Tablet 40 milliGRAM(s) Oral before breakfast  tiotropium 18 MICROgram(s) Capsule 1 Capsule(s) Inhalation daily  warfarin 7.5 milliGRAM(s) Oral once      PHYSICAL EXAM    Subjective: "I feel tired."   Neurology: alert and oriented x 3, nonfocal, no gross deficits  CV : tele:  RSR    Sternal Wound :  CDI DESTINEE;  no pw    Lungs: clear. RR easy, unlabored   Abdomen: soft, nontender, nondistended, positive bowel sounds, + bowel movement   Neg N/V/D   :  pt voiding without difficulty   Extremities:   WORTHY; neg LE edema, neg calf tenderness.   PPP bilaterally      PW: none  Chest tubes: none VITAL SIGNS    Telemetry:  rsr    Vital Signs Last 24 Hrs  T(C): 37 (19 @ 05:00), Max: 37 (19 @ 05:00)  T(F): 98.6 (19 @ 05:00), Max: 98.6 (19 @ 05:00)  HR: 98 (19 @ 05:00) (98 - 102)  BP: 105/66 (19 @ 05:00) (105/66 - 117/79)  RR: 18 (19 @ 05:00) (17 - 18)  SpO2: 96% (19 @ 05:00) (93% - 96%)             @ 07:01  -   @ 07:00  --------------------------------------------------------  IN: 2270 mL / OUT: 2375 mL / NET: -105 mL     @ 07:01  -   @ 10:46  --------------------------------------------------------  IN: 0 mL / OUT: 600 mL / NET: -600 mL       Daily     Daily Weight in k.3 (2019 07:39)  Admit Wt: Drug Dosing Weight  Height (cm): 162.56 (2019 08:31)  Weight (kg): 66.7 (2019 08:31)  BMI (kg/m2): 25.2 (2019 08:31)  BSA (m2): 1.72 (2019 08:31)            ALBUTerol    90 MICROgram(s) HFA Inhaler 1 Puff(s) Inhalation every 4 hours  ALPRAZolam 0.25 milliGRAM(s) Oral every 8 hours PRN  aspirin  chewable 81 milliGRAM(s) Oral daily  atorvastatin 20 milliGRAM(s) Oral at bedtime  buDESOnide   0.5 milliGRAM(s) Respule 0.5 milliGRAM(s) Inhalation two times a day  docusate sodium 100 milliGRAM(s) Oral three times a day  enoxaparin Injectable 40 milliGRAM(s) SubCutaneous daily  melatonin 5 milliGRAM(s) Oral at bedtime  metoprolol tartrate 12.5 milliGRAM(s) Oral two times a day  midodrine 5 milliGRAM(s) Oral every 8 hours  pantoprazole    Tablet 40 milliGRAM(s) Oral before breakfast  tiotropium 18 MICROgram(s) Capsule 1 Capsule(s) Inhalation daily  warfarin 7.5 milliGRAM(s) Oral once      PHYSICAL EXAM    Subjective: "I feel tired."   Neurology: alert and oriented x 3, nonfocal, no gross deficits  CV : tele:  RSR    Sternal Wound :  CDI DESTINEE;  no pw    Lungs: clear. RR easy, unlabored   Abdomen: soft, nontender, nondistended, positive bowel sounds, + bowel movement   Neg N/V/D   :  pt voiding without difficulty   Extremities:   WORTHY; neg LE edema, neg calf tenderness.   PPP bilaterally      PW: none  Chest tubes: none

## 2019-04-28 LAB
ANION GAP SERPL CALC-SCNC: 12 MMOL/L — SIGNIFICANT CHANGE UP (ref 5–17)
BUN SERPL-MCNC: 10 MG/DL — SIGNIFICANT CHANGE UP (ref 7–23)
CALCIUM SERPL-MCNC: 9.9 MG/DL — SIGNIFICANT CHANGE UP (ref 8.4–10.5)
CHLORIDE SERPL-SCNC: 103 MMOL/L — SIGNIFICANT CHANGE UP (ref 96–108)
CO2 SERPL-SCNC: 21 MMOL/L — LOW (ref 22–31)
CREAT SERPL-MCNC: 0.51 MG/DL — SIGNIFICANT CHANGE UP (ref 0.5–1.3)
GLUCOSE SERPL-MCNC: 105 MG/DL — HIGH (ref 70–99)
HCT VFR BLD CALC: 30.3 % — LOW (ref 34.5–45)
HGB BLD-MCNC: 10.4 G/DL — LOW (ref 11.5–15.5)
INR BLD: 1.74 RATIO — HIGH (ref 0.88–1.16)
MCHC RBC-ENTMCNC: 31.2 PG — SIGNIFICANT CHANGE UP (ref 27–34)
MCHC RBC-ENTMCNC: 34.5 GM/DL — SIGNIFICANT CHANGE UP (ref 32–36)
MCV RBC AUTO: 90.4 FL — SIGNIFICANT CHANGE UP (ref 80–100)
PLATELET # BLD AUTO: 487 K/UL — HIGH (ref 150–400)
POTASSIUM SERPL-MCNC: 4.2 MMOL/L — SIGNIFICANT CHANGE UP (ref 3.5–5.3)
POTASSIUM SERPL-SCNC: 4.2 MMOL/L — SIGNIFICANT CHANGE UP (ref 3.5–5.3)
PROTHROM AB SERPL-ACNC: 20.2 SEC — HIGH (ref 10–12.9)
RBC # BLD: 3.35 M/UL — LOW (ref 3.8–5.2)
RBC # FLD: 13.2 % — SIGNIFICANT CHANGE UP (ref 10.3–14.5)
SODIUM SERPL-SCNC: 136 MMOL/L — SIGNIFICANT CHANGE UP (ref 135–145)
WBC # BLD: 10.3 K/UL — SIGNIFICANT CHANGE UP (ref 3.8–10.5)
WBC # FLD AUTO: 10.3 K/UL — SIGNIFICANT CHANGE UP (ref 3.8–10.5)

## 2019-04-28 PROCEDURE — 93010 ELECTROCARDIOGRAM REPORT: CPT | Mod: 77

## 2019-04-28 PROCEDURE — 93010 ELECTROCARDIOGRAM REPORT: CPT

## 2019-04-28 RX ORDER — WARFARIN SODIUM 2.5 MG/1
7.5 TABLET ORAL ONCE
Qty: 0 | Refills: 0 | Status: COMPLETED | OUTPATIENT
Start: 2019-04-28 | End: 2019-04-28

## 2019-04-28 RX ADMIN — MIDODRINE HYDROCHLORIDE 5 MILLIGRAM(S): 2.5 TABLET ORAL at 06:41

## 2019-04-28 RX ADMIN — Medication 25 MILLIGRAM(S): at 06:42

## 2019-04-28 RX ADMIN — MIDODRINE HYDROCHLORIDE 5 MILLIGRAM(S): 2.5 TABLET ORAL at 21:53

## 2019-04-28 RX ADMIN — MIDODRINE HYDROCHLORIDE 5 MILLIGRAM(S): 2.5 TABLET ORAL at 13:10

## 2019-04-28 RX ADMIN — ATORVASTATIN CALCIUM 20 MILLIGRAM(S): 80 TABLET, FILM COATED ORAL at 21:53

## 2019-04-28 RX ADMIN — Medication 25 MILLIGRAM(S): at 13:10

## 2019-04-28 RX ADMIN — Medication 0.25 MILLIGRAM(S): at 21:52

## 2019-04-28 RX ADMIN — Medication 0.5 MILLIGRAM(S): at 17:52

## 2019-04-28 RX ADMIN — WARFARIN SODIUM 7.5 MILLIGRAM(S): 2.5 TABLET ORAL at 11:17

## 2019-04-28 RX ADMIN — Medication 0.5 MILLIGRAM(S): at 06:42

## 2019-04-28 RX ADMIN — PANTOPRAZOLE SODIUM 40 MILLIGRAM(S): 20 TABLET, DELAYED RELEASE ORAL at 06:42

## 2019-04-28 RX ADMIN — Medication 81 MILLIGRAM(S): at 13:11

## 2019-04-28 RX ADMIN — Medication 25 MILLIGRAM(S): at 21:53

## 2019-04-28 RX ADMIN — ENOXAPARIN SODIUM 40 MILLIGRAM(S): 100 INJECTION SUBCUTANEOUS at 13:10

## 2019-04-28 NOTE — PROGRESS NOTE ADULT - PROBLEM SELECTOR PLAN 2
resolved at this time   EPS following  pt converted to RSR 90- 100   daily EKG  Continue lopressor 25 mg po q8

## 2019-04-28 NOTE — PROGRESS NOTE ADULT - ASSESSMENT
69  yr old female sp   MVR/KASIE, MAZE  H  post op   Inotropes,  fevers +UTI  on IV  ABX  ,  coumadin dosing  4/22  Trf floor  NSR ,  + PW  coumadin dose 7.5 mg,  dosed this am    ambulating  4/23      amio  load  for KVNG ,   coumadin dosing 5 mg  for  INR   1.57,   +  PW  4/24     inc  amio to 400 BID  per EP  cslt and monitor   coumadin dosing of 7.5 mf   for INR   1.54   ambulating  4/25    EKG  NSR first degree    BP stable  isolated pw    INR    1.58   coumadin 5 mg   off amio and BB with EP following  4/26 VSS; INR 1.6- coumadin 7.5 mg this am; initiate low dose bb as per EPS today- lopressor 12. 5 mg po bid and observe HR  IV abx d/c   4/27 VSS; INR 1.7 - coumadin 7.5 mg this evening; increase lopressor 25 mg po q8 as per DR. Bergman; EKG done- qtc < 600 ; ck pa/lat chest xray;   Discharge planning- home when HR and INR stable monday likely   4/28: HR stable SR 80s-INR 1.7  coum 7.5 today  today improvement from yesterday

## 2019-04-28 NOTE — PROGRESS NOTE ADULT - PROBLEM SELECTOR PLAN 1
continue postop care  anticoagulation for mitral valve  lopressor 25 TID  Discharge planning- home when medically cleared

## 2019-04-28 NOTE — PROGRESS NOTE ADULT - SUBJECTIVE AND OBJECTIVE BOX
VITAL SIGNS    Telemetry:    Vital Signs Last 24 Hrs  T(C): 36.7 (19 @ 05:12), Max: 36.7 (19 @ 19:24)  T(F): 98.1 (19 @ 05:12), Max: 98.1 (19 @ 19:24)  HR: 98 (19 06:37) (73 - 98)  BP: 106/62 (19 @ 06:37) (91/54 - 114/68)  RR: 18 (19 @ 06:37) (17 - 18)  SpO2: 96% (19 @ 06:37) (94% - 96%)             @ 07:01  -   @ 07:00  --------------------------------------------------------  IN: 1430 mL / OUT: 1500 mL / NET: -70 mL     @ 07:01  -   @ 12:28  --------------------------------------------------------  IN: 480 mL / OUT: 1 mL / NET: 479 mL       Daily     Daily Weight in k.2 (2019 08:00)  Admit Wt: Drug Dosing Weight  Height (cm): 162.56 (2019 08:31)  Weight (kg): 66.7 (2019 08:31)  BMI (kg/m2): 25.2 (2019 08:31)  BSA (m2): 1.72 (2019 08:31)     Daily Weight in k.2 (19 @ 08:00)    [facility]      136  |  103  |  10  ----------------------------<  105<H>  4.2   |  21<L>  |  0.51    Ca    9.9      2019 06:48  Phos  3.3     04-27  Mg     2.1     04-27                                   10.4   10.3  )-----------( 487      ( 2019 06:48 )             30.3          PT/INR - ( 2019 06:48 )   PT: 20.2 sec;   INR: 1.74 ratio                   CAPILLARY BLOOD GLUCOSE              Drains:     MS       [  ]   [  ]            L Pleural [  ]            R Pleural  [  ]            YASHIRA  [  ]           Fierro  [  ]    Pacing Wires      [  ]   Settings:                                  Isolated  [  ]                    CXR:      MEDICATIONS  ALBUTerol    90 MICROgram(s) HFA Inhaler 1 Puff(s) Inhalation every 4 hours  ALPRAZolam 0.25 milliGRAM(s) Oral every 8 hours PRN  aspirin  chewable 81 milliGRAM(s) Oral daily  atorvastatin 20 milliGRAM(s) Oral at bedtime  buDESOnide   0.5 milliGRAM(s) Respule 0.5 milliGRAM(s) Inhalation two times a day  docusate sodium 100 milliGRAM(s) Oral three times a day  enoxaparin Injectable 40 milliGRAM(s) SubCutaneous daily  melatonin 5 milliGRAM(s) Oral at bedtime  metoprolol tartrate 25 milliGRAM(s) Oral every 8 hours  midodrine 5 milliGRAM(s) Oral every 8 hours  pantoprazole    Tablet 40 milliGRAM(s) Oral before breakfast  tiotropium 18 MICROgram(s) Capsule 1 Capsule(s) Inhalation daily      PHYSICAL EXAM      Neurology: alert and oriented x 3, nonfocal, no gross deficits  CV :S1S2  Sternal Wound :  CDI , Stable  Lungs: CTA  Abdomen: soft, nontender, nondistended, positive bowel sounds, last bowel movement   :   voids    Extremities:  Warm to touch no edema                  PAST MEDICAL & SURGICAL HISTORY:  Allergy-induced asthma, mild intermittent, uncomplicated: Denies any exacerbation or ED admission  Osteopenia  MVP (mitral valve prolapse)  Trigger finger  Hyperlipidemia  H/O laminectomy: 2017  Cataract: 10 yrs ago  Breast fibroadenoma in female, left: 1988       Discussed with Cardiothoracic Team at AM rounds.

## 2019-04-29 ENCOUNTER — TRANSCRIPTION ENCOUNTER (OUTPATIENT)
Age: 69
End: 2019-04-29

## 2019-04-29 VITALS
RESPIRATION RATE: 18 BRPM | SYSTOLIC BLOOD PRESSURE: 152 MMHG | OXYGEN SATURATION: 94 % | TEMPERATURE: 98 F | HEART RATE: 77 BPM | DIASTOLIC BLOOD PRESSURE: 59 MMHG

## 2019-04-29 LAB
ANION GAP SERPL CALC-SCNC: 12 MMOL/L — SIGNIFICANT CHANGE UP (ref 5–17)
BUN SERPL-MCNC: 7 MG/DL — SIGNIFICANT CHANGE UP (ref 7–23)
CALCIUM SERPL-MCNC: 9.9 MG/DL — SIGNIFICANT CHANGE UP (ref 8.4–10.5)
CHLORIDE SERPL-SCNC: 103 MMOL/L — SIGNIFICANT CHANGE UP (ref 96–108)
CO2 SERPL-SCNC: 21 MMOL/L — LOW (ref 22–31)
CREAT SERPL-MCNC: 0.54 MG/DL — SIGNIFICANT CHANGE UP (ref 0.5–1.3)
GLUCOSE SERPL-MCNC: 106 MG/DL — HIGH (ref 70–99)
HCT VFR BLD CALC: 29.6 % — LOW (ref 34.5–45)
HGB BLD-MCNC: 10.1 G/DL — LOW (ref 11.5–15.5)
INR BLD: 1.88 RATIO — HIGH (ref 0.88–1.16)
MCHC RBC-ENTMCNC: 30.6 PG — SIGNIFICANT CHANGE UP (ref 27–34)
MCHC RBC-ENTMCNC: 34 GM/DL — SIGNIFICANT CHANGE UP (ref 32–36)
MCV RBC AUTO: 90 FL — SIGNIFICANT CHANGE UP (ref 80–100)
PLATELET # BLD AUTO: 493 K/UL — HIGH (ref 150–400)
POTASSIUM SERPL-MCNC: 4.1 MMOL/L — SIGNIFICANT CHANGE UP (ref 3.5–5.3)
POTASSIUM SERPL-SCNC: 4.1 MMOL/L — SIGNIFICANT CHANGE UP (ref 3.5–5.3)
PROTHROM AB SERPL-ACNC: 22 SEC — HIGH (ref 10–12.9)
RBC # BLD: 3.29 M/UL — LOW (ref 3.8–5.2)
RBC # FLD: 12.7 % — SIGNIFICANT CHANGE UP (ref 10.3–14.5)
SODIUM SERPL-SCNC: 136 MMOL/L — SIGNIFICANT CHANGE UP (ref 135–145)
WBC # BLD: 9.1 K/UL — SIGNIFICANT CHANGE UP (ref 3.8–10.5)
WBC # FLD AUTO: 9.1 K/UL — SIGNIFICANT CHANGE UP (ref 3.8–10.5)

## 2019-04-29 PROCEDURE — 84132 ASSAY OF SERUM POTASSIUM: CPT

## 2019-04-29 PROCEDURE — 86891 AUTOLOGOUS BLOOD OP SALVAGE: CPT

## 2019-04-29 PROCEDURE — 85014 HEMATOCRIT: CPT

## 2019-04-29 PROCEDURE — 82962 GLUCOSE BLOOD TEST: CPT

## 2019-04-29 PROCEDURE — 36430 TRANSFUSION BLD/BLD COMPNT: CPT

## 2019-04-29 PROCEDURE — 86901 BLOOD TYPING SEROLOGIC RH(D): CPT

## 2019-04-29 PROCEDURE — 85610 PROTHROMBIN TIME: CPT

## 2019-04-29 PROCEDURE — 99231 SBSQ HOSP IP/OBS SF/LOW 25: CPT

## 2019-04-29 PROCEDURE — P9045: CPT

## 2019-04-29 PROCEDURE — 94002 VENT MGMT INPAT INIT DAY: CPT

## 2019-04-29 PROCEDURE — P9016: CPT

## 2019-04-29 PROCEDURE — 94640 AIRWAY INHALATION TREATMENT: CPT

## 2019-04-29 PROCEDURE — 99238 HOSP IP/OBS DSCHRG MGMT 30/<: CPT | Mod: 24

## 2019-04-29 PROCEDURE — 84443 ASSAY THYROID STIM HORMONE: CPT

## 2019-04-29 PROCEDURE — 82553 CREATINE MB FRACTION: CPT

## 2019-04-29 PROCEDURE — 31720 CLEARANCE OF AIRWAYS: CPT

## 2019-04-29 PROCEDURE — 82550 ASSAY OF CK (CPK): CPT

## 2019-04-29 PROCEDURE — 71046 X-RAY EXAM CHEST 2 VIEWS: CPT

## 2019-04-29 PROCEDURE — 86900 BLOOD TYPING SEROLOGIC ABO: CPT

## 2019-04-29 PROCEDURE — 85384 FIBRINOGEN ACTIVITY: CPT

## 2019-04-29 PROCEDURE — 84484 ASSAY OF TROPONIN QUANT: CPT

## 2019-04-29 PROCEDURE — 97116 GAIT TRAINING THERAPY: CPT

## 2019-04-29 PROCEDURE — C1889: CPT

## 2019-04-29 PROCEDURE — 85027 COMPLETE CBC AUTOMATED: CPT

## 2019-04-29 PROCEDURE — 88305 TISSUE EXAM BY PATHOLOGIST: CPT

## 2019-04-29 PROCEDURE — C1751: CPT

## 2019-04-29 PROCEDURE — 85730 THROMBOPLASTIN TIME PARTIAL: CPT

## 2019-04-29 PROCEDURE — 80053 COMPREHEN METABOLIC PANEL: CPT

## 2019-04-29 PROCEDURE — 84100 ASSAY OF PHOSPHORUS: CPT

## 2019-04-29 PROCEDURE — C1769: CPT

## 2019-04-29 PROCEDURE — C8929: CPT

## 2019-04-29 PROCEDURE — 82565 ASSAY OF CREATININE: CPT

## 2019-04-29 PROCEDURE — 97161 PT EVAL LOW COMPLEX 20 MIN: CPT

## 2019-04-29 PROCEDURE — 93005 ELECTROCARDIOGRAM TRACING: CPT

## 2019-04-29 PROCEDURE — 87186 SC STD MICRODIL/AGAR DIL: CPT

## 2019-04-29 PROCEDURE — 84295 ASSAY OF SERUM SODIUM: CPT

## 2019-04-29 PROCEDURE — 97530 THERAPEUTIC ACTIVITIES: CPT

## 2019-04-29 PROCEDURE — 82330 ASSAY OF CALCIUM: CPT

## 2019-04-29 PROCEDURE — 83605 ASSAY OF LACTIC ACID: CPT

## 2019-04-29 PROCEDURE — 81001 URINALYSIS AUTO W/SCOPE: CPT

## 2019-04-29 PROCEDURE — 80048 BASIC METABOLIC PNL TOTAL CA: CPT

## 2019-04-29 PROCEDURE — 71045 X-RAY EXAM CHEST 1 VIEW: CPT

## 2019-04-29 PROCEDURE — 83735 ASSAY OF MAGNESIUM: CPT

## 2019-04-29 PROCEDURE — 86923 COMPATIBILITY TEST ELECTRIC: CPT

## 2019-04-29 PROCEDURE — 82803 BLOOD GASES ANY COMBINATION: CPT

## 2019-04-29 PROCEDURE — 82435 ASSAY OF BLOOD CHLORIDE: CPT

## 2019-04-29 PROCEDURE — 87086 URINE CULTURE/COLONY COUNT: CPT

## 2019-04-29 PROCEDURE — P9047: CPT

## 2019-04-29 PROCEDURE — 82947 ASSAY GLUCOSE BLOOD QUANT: CPT

## 2019-04-29 RX ORDER — ATENOLOL 25 MG/1
50 TABLET ORAL EVERY 12 HOURS
Qty: 0 | Refills: 0 | Status: DISCONTINUED | OUTPATIENT
Start: 2019-04-29 | End: 2019-04-29

## 2019-04-29 RX ORDER — CHOLECALCIFEROL (VITAMIN D3) 125 MCG
1 CAPSULE ORAL
Qty: 0 | Refills: 0 | COMMUNITY

## 2019-04-29 RX ORDER — ASPIRIN/CALCIUM CARB/MAGNESIUM 324 MG
1 TABLET ORAL
Qty: 30 | Refills: 0 | OUTPATIENT
Start: 2019-04-29 | End: 2019-05-28

## 2019-04-29 RX ORDER — DOCUSATE SODIUM 100 MG
1 CAPSULE ORAL
Qty: 30 | Refills: 0 | OUTPATIENT
Start: 2019-04-29 | End: 2019-05-08

## 2019-04-29 RX ORDER — VENLAFAXINE HCL 75 MG
1 CAPSULE, EXT RELEASE 24 HR ORAL
Qty: 0 | Refills: 0 | COMMUNITY

## 2019-04-29 RX ORDER — MIDODRINE HYDROCHLORIDE 2.5 MG/1
1 TABLET ORAL
Qty: 90 | Refills: 0 | OUTPATIENT
Start: 2019-04-29 | End: 2019-05-28

## 2019-04-29 RX ORDER — ALPRAZOLAM 0.25 MG
1 TABLET ORAL
Qty: 45 | Refills: 0 | OUTPATIENT
Start: 2019-04-29 | End: 2019-05-13

## 2019-04-29 RX ORDER — ATENOLOL 25 MG/1
1 TABLET ORAL
Qty: 60 | Refills: 0 | OUTPATIENT
Start: 2019-04-29 | End: 2019-05-28

## 2019-04-29 RX ORDER — FLUTICASONE PROPIONATE 220 MCG
1 AEROSOL WITH ADAPTER (GRAM) INHALATION
Qty: 0 | Refills: 0 | COMMUNITY

## 2019-04-29 RX ORDER — RANITIDINE HYDROCHLORIDE 150 MG/1
1 TABLET, FILM COATED ORAL
Qty: 0 | Refills: 0 | COMMUNITY

## 2019-04-29 RX ORDER — EZETIMIBE AND SIMVASTATIN 10; 80 MG/1; MG/1
1 TABLET, FILM COATED ORAL
Qty: 0 | Refills: 0 | COMMUNITY

## 2019-04-29 RX ORDER — VALSARTAN 80 MG/1
1 TABLET ORAL
Qty: 0 | Refills: 0 | COMMUNITY

## 2019-04-29 RX ORDER — CETIRIZINE HYDROCHLORIDE 10 MG/1
1 TABLET ORAL
Qty: 0 | Refills: 0 | COMMUNITY

## 2019-04-29 RX ORDER — WARFARIN SODIUM 2.5 MG/1
1 TABLET ORAL
Qty: 30 | Refills: 0 | OUTPATIENT
Start: 2019-04-29 | End: 2019-05-28

## 2019-04-29 RX ORDER — LANOLIN ALCOHOL/MO/W.PET/CERES
1 CREAM (GRAM) TOPICAL
Qty: 0 | Refills: 0 | COMMUNITY
Start: 2019-04-29

## 2019-04-29 RX ORDER — ALBUTEROL 90 UG/1
2 AEROSOL, METERED ORAL
Qty: 0 | Refills: 0 | COMMUNITY

## 2019-04-29 RX ADMIN — Medication 0.5 MILLIGRAM(S): at 06:00

## 2019-04-29 RX ADMIN — MIDODRINE HYDROCHLORIDE 5 MILLIGRAM(S): 2.5 TABLET ORAL at 06:00

## 2019-04-29 RX ADMIN — ATENOLOL 50 MILLIGRAM(S): 25 TABLET ORAL at 06:00

## 2019-04-29 RX ADMIN — PANTOPRAZOLE SODIUM 40 MILLIGRAM(S): 20 TABLET, DELAYED RELEASE ORAL at 06:00

## 2019-04-29 NOTE — PROGRESS NOTE ADULT - PROVIDER SPECIALTY LIST ADULT
CT Surgery
Critical Care
Electrophysiology
CT Surgery

## 2019-04-29 NOTE — PROGRESS NOTE ADULT - ASSESSMENT
A/P: 69 year old F pt with PMH of MVP/severe MR and HLD s/p bioprosthetic MVR and maze with LA exclusion(4/16), EP consulted for AF RVR.    - sinus rhythm on telemetry  - amiodarone and venlafaxine discontinued since torsades  - metop 25mg TID switched to atenolol 50mg BID per primary  - pending ziopatch prior to discharge    Tirso Ziegler, #[id number]  EP Fellow

## 2019-04-29 NOTE — DISCHARGE NOTE NURSING/CASE MANAGEMENT/SOCIAL WORK - NSDCPEPTCOWAR_GEN_ALL_CORE
Coumadin/Warfarin - Follow up monitoring/Coumadin/Warfarin - Dietary Advice/Coumadin/Warfarin - Potential for adverse drug reactions and interactions/Coumadin/Warfarin - Compliance

## 2019-04-29 NOTE — PROGRESS NOTE ADULT - SUBJECTIVE AND OBJECTIVE BOX
Cardiology Progress Note    Interval: No acute events over the weekend, feeling well and ready to be discharged today.    Tele: Sinus 70-90's    HPI:  70 yo F with h/o HLD, MVP/MR, allergy induced Asthma (controlled) had f/u cardiology evaluation. ECHO revealed moderate to severe MR . Pt had cardiac cath /surgical consult- scheduled for mitral valve repair, possible replacement, radio frequency, Maze procedure, left atrial appendage closure on 2019. Pt denies any chest pain/SOB/syncope. (10 Apr 2019 10:14)      Medications:  ALBUTerol    90 MICROgram(s) HFA Inhaler 1 Puff(s) Inhalation every 4 hours  ALPRAZolam 0.25 milliGRAM(s) Oral every 8 hours PRN  aspirin  chewable 81 milliGRAM(s) Oral daily  ATENolol  Tablet 50 milliGRAM(s) Oral every 12 hours  atorvastatin 20 milliGRAM(s) Oral at bedtime  buDESOnide   0.5 milliGRAM(s) Respule 0.5 milliGRAM(s) Inhalation two times a day  docusate sodium 100 milliGRAM(s) Oral three times a day  enoxaparin Injectable 40 milliGRAM(s) SubCutaneous daily  melatonin 5 milliGRAM(s) Oral at bedtime  midodrine 5 milliGRAM(s) Oral every 8 hours  pantoprazole    Tablet 40 milliGRAM(s) Oral before breakfast  tiotropium 18 MICROgram(s) Capsule 1 Capsule(s) Inhalation daily      Review of Systems:  Constitutional: [ ] Fever [ ] Chills [ ] Fatigue [ ] Weight change   HEENT: [ ] Blurred vision [ ] Eye Pain [ ] Headache [ ] Runny nose [ ] Sore Throat   Respiratory: [ ] Cough [ ] Wheezing [ ] Shortness of breath  Cardiovascular: [ ] Chest Pain [ ] Palpitations [ ] GAYTAN [ ] PND [ ] Orthopnea  Gastrointestinal: [ ] Abdominal Pain [ ] Diarrhea [ ] Constipation [ ] Hemorrhoids [ ] Nausea [ ] Vomiting  Genitourinary: [ ] Nocturia [ ] Dysuria [ ] Incontinence  Extremities: [ ] Swelling [ ] Joint Pain  Neurologic: [ ] Focal deficit [ ] Paresthesias [ ] Syncope  Lymphatic: [ ] Swelling [ ] Lymphadenopathy   Skin: [ ] Rash [ ] Ecchymoses [ ] Wounds [ ] Lesions  Psychiatry: [ ] Depression [ ] Suicidal/Homicidal Ideation [ ] Anxiety [ ] Sleep Disturbances  [ ] 10 point review of systems is otherwise negative except as mentioned above            [ ]Unable to obtain    Vitals:  T(C): 36.7 (19 @ 05:03), Max: 36.8 (19 @ 12:54)  HR: 90 (19 @ 05:03) (90 - 94)  BP: 114/52 (19 @ 05:03) (112/69 - 122/75)  BP(mean): --  RR: 18 (19 @ 05:03) (18 - 19)  SpO2: 96% (19 @ 05:03) (95% - 96%)  Wt(kg): --  Daily     Daily Weight in k.6 (2019 08:12)  I&O's Summary    2019 07:01  -  2019 07:00  --------------------------------------------------------  IN: 1800 mL / OUT: 700 mL / NET: 1100 mL        Physical Exam:  General: NAD  Eye: PERRL, EOMI  HENT: Normal oral mucosa NC/AT  CV: Normal S1/S2, RRR, No M/R/G, no edema, no elevation in JVP  Resp: Normal respiratory effort, clear to auscultation bilaterally, no wheezing, no crackles  Abd: Soft, Non-tender, Non-distended, BS+  Ext: No clubbing, No joint deformity   Neuro: Non-focal, motor and sensory intact  Lymph: No lymphadenopathy  Psych: AAOx3, Mood & affect appropriate  Skin: No rashes, No ecchymoses, No cyanosis    Labs:                        10.1   9.1   )-----------( 493      ( 2019 05:38 )             29.6         136  |  103  |  7   ----------------------------<  106<H>  4.1   |  21<L>  |  0.54    Ca    9.9      2019 05:38  Phos  3.3     04-27  Mg     2.1     04-27      PT/INR - ( 2019 05:38 )   PT: 22.0 sec;   INR: 1.88 ratio                       New results/imaging:

## 2019-04-29 NOTE — DISCHARGE NOTE NURSING/CASE MANAGEMENT/SOCIAL WORK - NSDCDPATPORTLINK_GEN_ALL_CORE
You can access the [facility] Patient Portal, offered by [facility], by registering with the following website: [email or web address]

## 2019-04-30 LAB — SURGICAL PATHOLOGY STUDY: SIGNIFICANT CHANGE UP

## 2019-05-01 ENCOUNTER — LABORATORY RESULT (OUTPATIENT)
Age: 69
End: 2019-05-01

## 2019-05-01 ENCOUNTER — APPOINTMENT (OUTPATIENT)
Age: 69
End: 2019-05-01
Payer: COMMERCIAL

## 2019-05-01 VITALS — SYSTOLIC BLOOD PRESSURE: 120 MMHG | DIASTOLIC BLOOD PRESSURE: 78 MMHG | RESPIRATION RATE: 16 BRPM | HEART RATE: 78 BPM

## 2019-05-01 PROCEDURE — 99024 POSTOP FOLLOW-UP VISIT: CPT

## 2019-05-01 RX ORDER — VENLAFAXINE HYDROCHLORIDE 37.5 MG/1
37.5 CAPSULE, EXTENDED RELEASE ORAL
Refills: 0 | Status: DISCONTINUED | COMMUNITY
End: 2019-05-01

## 2019-05-01 RX ORDER — VENLAFAXINE HYDROCHLORIDE 37.5 MG/1
37.5 CAPSULE, EXTENDED RELEASE ORAL DAILY
Qty: 90 | Refills: 3 | Status: DISCONTINUED | COMMUNITY
Start: 2018-12-12 | End: 2019-05-01

## 2019-05-01 RX ORDER — MOMETASONE FUROATE 220 UG/1
220 INHALANT RESPIRATORY (INHALATION)
Refills: 0 | Status: DISCONTINUED | COMMUNITY
End: 2019-05-01

## 2019-05-01 RX ORDER — VALSARTAN 160 MG/1
160 TABLET ORAL TWICE DAILY
Refills: 0 | Status: DISCONTINUED | COMMUNITY
End: 2019-05-01

## 2019-05-01 RX ORDER — VENLAFAXINE HYDROCHLORIDE 37.5 MG/1
37.5 CAPSULE, EXTENDED RELEASE ORAL DAILY
Qty: 90 | Refills: 1 | Status: DISCONTINUED | COMMUNITY
Start: 2019-01-25 | End: 2019-05-01

## 2019-05-01 NOTE — ASSESSMENT
[FreeTextEntry1] : This is a 70 yo female s/p MVR(T) MAZE on 4/16 by Dr. Bergman.  Pt is doing well.  Discussed outpatient cardiac rehab options and will call [facility] cardiac rehab.  Pt on Coumadin and having INR checked today and result are to be sent and managed by Dr. Bergman.  Orthostatics done and negative.  Pt will monitor BP for next 2 days and advise NP and will begin to wean Midodrine if possible.

## 2019-05-01 NOTE — PHYSICAL EXAM
[Heart Rate And Rhythm] : heart rate was normal and rhythm regular [Auscultation Breath Sounds / Voice Sounds] : lungs were clear to auscultation bilaterally [Heart Sounds] : normal S1 and S2 [Murmurs] : no murmurs [Heart Sounds Gallop] : no gallops [Heart Sounds Pericardial Friction Rub] : no pericardial rub [Chest Visual Inspection Thoracic Asymmetry] : no chest asymmetry [Examination Of The Chest] : the chest was normal in appearance [Diminished Respiratory Excursion] : normal chest expansion [Bowel Sounds] : normal bowel sounds [Abdomen Soft] : soft [Abdomen Tenderness] : non-tender [Abdomen Mass (___ Cm)] : no abdominal mass palpated [Nail Clubbing] : no clubbing  or cyanosis of the fingernails [Musculoskeletal - Swelling] : no joint swelling seen [Abnormal Walk] : normal gait [Skin Color & Pigmentation] : normal skin color and pigmentation [Motor Tone] : muscle strength and tone were normal [] : no rash [Skin Turgor] : normal skin turgor [Deep Tendon Reflexes (DTR)] : deep tendon reflexes were 2+ and symmetric [Sensation] : the sensory exam was normal to light touch and pinprick [No Focal Deficits] : no focal deficits [Oriented To Time, Place, And Person] : oriented to person, place, and time [Impaired Insight] : insight and judgment were intact [Affect] : the affect was normal

## 2019-05-01 NOTE — HISTORY OF PRESENT ILLNESS
[FreeTextEntry1] : This is a 70 yo female s/p MVR (T)/MAZE on 4/16/19 with Dr. Bergman at [facility].  Post op complicated by afib, arrhythmias, orthostasis.  Pt states that she is feeling well, but tired and currently offers no complaints.  Pt seen with her niece.  Denies fever, SOB, pain, palpitations, dizziness, weakness, or edema.

## 2019-05-03 ENCOUNTER — INBOUND DOCUMENT (OUTPATIENT)
Age: 69
End: 2019-05-03

## 2019-05-06 ENCOUNTER — LABORATORY RESULT (OUTPATIENT)
Age: 69
End: 2019-05-06

## 2019-05-08 ENCOUNTER — APPOINTMENT (OUTPATIENT)
Dept: CARDIOTHORACIC SURGERY | Facility: CLINIC | Age: 69
End: 2019-05-08

## 2019-05-08 VITALS
SYSTOLIC BLOOD PRESSURE: 118 MMHG | OXYGEN SATURATION: 98 % | RESPIRATION RATE: 12 BRPM | HEART RATE: 71 BPM | DIASTOLIC BLOOD PRESSURE: 76 MMHG | HEIGHT: 64 IN | TEMPERATURE: 98.3 F

## 2019-05-08 VITALS — DIASTOLIC BLOOD PRESSURE: 87 MMHG | SYSTOLIC BLOOD PRESSURE: 128 MMHG

## 2019-05-08 LAB
INR PPP: 2.47 RATIO
PT BLD: 28.9 SEC

## 2019-05-08 RX ORDER — MIDODRINE HYDROCHLORIDE 5 MG/1
5 TABLET ORAL 3 TIMES DAILY
Refills: 0 | Status: COMPLETED | COMMUNITY
End: 2019-05-08

## 2019-05-14 ENCOUNTER — NON-APPOINTMENT (OUTPATIENT)
Age: 69
End: 2019-05-14

## 2019-05-14 ENCOUNTER — APPOINTMENT (OUTPATIENT)
Dept: CARDIOLOGY | Facility: CLINIC | Age: 69
End: 2019-05-14
Payer: COMMERCIAL

## 2019-05-14 ENCOUNTER — APPOINTMENT (OUTPATIENT)
Dept: ELECTROPHYSIOLOGY | Facility: CLINIC | Age: 69
End: 2019-05-14
Payer: COMMERCIAL

## 2019-05-14 VITALS
HEIGHT: 64 IN | SYSTOLIC BLOOD PRESSURE: 111 MMHG | DIASTOLIC BLOOD PRESSURE: 64 MMHG | OXYGEN SATURATION: 98 % | WEIGHT: 145 LBS | BODY MASS INDEX: 24.75 KG/M2 | HEART RATE: 73 BPM

## 2019-05-14 PROCEDURE — 93000 ELECTROCARDIOGRAM COMPLETE: CPT

## 2019-05-14 PROCEDURE — 99214 OFFICE O/P EST MOD 30 MIN: CPT

## 2019-05-14 PROCEDURE — 99215 OFFICE O/P EST HI 40 MIN: CPT

## 2019-05-14 NOTE — DISCUSSION/SUMMARY
[FreeTextEntry1] : Recovering well post OHS.  Will follow up on patch.  Continue atenolol.  Will consider titrating down after 3 months post.  Despite significant LA enlargement, she had  surgical Maze and KASIE overswen and we will consider for DC of oral AC 3 months post OHS.

## 2019-05-14 NOTE — HISTORY OF PRESENT ILLNESS
[FreeTextEntry1] : 69 year old F pt with PMH of MVP/severe MR and HLD s/p bioprosthetic MVR and maze with LA exclusion(4/16).  Course complicated by AF with RVR. \par \par No palpitations. No chest pain. No shortness of breath.  No lightheadedness/dizziness.  She has been to the gym twice.  She had one good walk on the treadmill, but had to abort the second day secondary to nausea and vomiting.

## 2019-05-14 NOTE — PHYSICAL EXAM
[General Appearance - Well Developed] : well developed [Normal Appearance] : normal appearance [Well Groomed] : well groomed [General Appearance - Well Nourished] : well nourished [No Deformities] : no deformities [General Appearance - In No Acute Distress] : no acute distress [Normal Conjunctiva] : the conjunctiva exhibited no abnormalities [Eyelids - No Xanthelasma] : the eyelids demonstrated no xanthelasmas [Normal Oral Mucosa] : normal oral mucosa [No Oral Pallor] : no oral pallor [Normal Jugular Venous A Waves Present] : normal jugular venous A waves present [No Oral Cyanosis] : no oral cyanosis [Normal Jugular Venous V Waves Present] : normal jugular venous V waves present [No Jugular Venous Mario A Waves] : no jugular venous mario A waves [Heart Rate And Rhythm] : heart rate and rhythm were normal [Murmurs] : no murmurs present [Heart Sounds] : normal S1 and S2 [Edema] : no peripheral edema present [Abdomen Soft] : soft [Abdomen Tenderness] : non-tender [Abdomen Mass (___ Cm)] : no abdominal mass palpated [Abnormal Walk] : normal gait [Gait - Sufficient For Exercise Testing] : the gait was sufficient for exercise testing [Nail Clubbing] : no clubbing of the fingernails [Petechial Hemorrhages (___cm)] : no petechial hemorrhages [Cyanosis, Localized] : no localized cyanosis [Skin Color & Pigmentation] : normal skin color and pigmentation [] : no ischemic changes [No Venous Stasis] : no venous stasis [Skin Lesions] : no skin lesions [No Xanthoma] : no  xanthoma was observed [No Skin Ulcers] : no skin ulcer [Affect] : the affect was normal [Oriented To Time, Place, And Person] : oriented to person, place, and time [Mood] : the mood was normal [No Anxiety] : not feeling anxious

## 2019-05-15 LAB
INR PPP: 3.78 RATIO
PT BLD: 44.5 SEC

## 2019-05-19 ENCOUNTER — FORM ENCOUNTER (OUTPATIENT)
Age: 69
End: 2019-05-19

## 2019-05-21 ENCOUNTER — APPOINTMENT (OUTPATIENT)
Dept: HEART AND VASCULAR | Facility: CLINIC | Age: 69
End: 2019-05-21

## 2019-05-28 ENCOUNTER — MEDICATION RENEWAL (OUTPATIENT)
Age: 69
End: 2019-05-28

## 2019-05-28 ENCOUNTER — APPOINTMENT (OUTPATIENT)
Dept: FAMILY MEDICINE | Facility: CLINIC | Age: 69
End: 2019-05-28
Payer: COMMERCIAL

## 2019-05-28 ENCOUNTER — LABORATORY RESULT (OUTPATIENT)
Age: 69
End: 2019-05-28

## 2019-05-28 VITALS
HEIGHT: 64 IN | SYSTOLIC BLOOD PRESSURE: 90 MMHG | WEIGHT: 143 LBS | BODY MASS INDEX: 24.41 KG/M2 | DIASTOLIC BLOOD PRESSURE: 62 MMHG | HEART RATE: 74 BPM

## 2019-05-28 DIAGNOSIS — Z86.39 PERSONAL HISTORY OF OTHER ENDOCRINE, NUTRITIONAL AND METABOLIC DISEASE: ICD-10-CM

## 2019-05-28 DIAGNOSIS — Z87.09 PERSONAL HISTORY OF OTHER DISEASES OF THE RESPIRATORY SYSTEM: ICD-10-CM

## 2019-05-28 DIAGNOSIS — D64.9 ANEMIA, UNSPECIFIED: ICD-10-CM

## 2019-05-28 PROCEDURE — 99214 OFFICE O/P EST MOD 30 MIN: CPT

## 2019-05-28 RX ORDER — MAGNESIUM OXIDE 400 MG
400 (241.3 MG) TABLET ORAL
Refills: 0 | Status: DISCONTINUED | COMMUNITY
End: 2019-05-28

## 2019-06-01 PROBLEM — Z86.39 HISTORY OF HYPERCHOLESTEROLEMIA: Status: RESOLVED | Noted: 2018-11-28 | Resolved: 2019-06-01

## 2019-06-01 PROBLEM — D64.9 ANEMIA: Status: ACTIVE | Noted: 2019-05-28

## 2019-06-01 PROBLEM — Z87.09 HISTORY OF ASTHMA: Status: RESOLVED | Noted: 2019-02-05 | Resolved: 2019-06-01

## 2019-06-01 NOTE — PHYSICAL EXAM
[Well Nourished] : well nourished [No Acute Distress] : no acute distress [Normal Sclera/Conjunctiva] : normal sclera/conjunctiva [Supple] : supple [No Lymphadenopathy] : no lymphadenopathy [Clear to Auscultation] : lungs were clear to auscultation bilaterally [No Respiratory Distress] : no respiratory distress  [No Accessory Muscle Use] : no accessory muscle use [Soft] : abdomen soft [Non Tender] : non-tender [Normal Bowel Sounds] : normal bowel sounds [No Joint Swelling] : no joint swelling [Grossly Normal Strength/Tone] : grossly normal strength/tone [PERRL] : pupils equal round and reactive to light [Normal Rate] : normal rate  [Regular Rhythm] : with a regular rhythm [Normal S1, S2] : normal S1 and S2 [Speech Grossly Normal] : speech grossly normal [Normal Mood] : the mood was normal [Normal Insight/Judgement] : insight and judgment were intact [de-identified] : INCISION C/D/I

## 2019-06-01 NOTE — ADDENDUM
[FreeTextEntry1] : Documented by Adriano Pena acting as a scribe for Dr. Tina Gutierrez on 05/28/2019.\par \par

## 2019-06-01 NOTE — PLAN
[FreeTextEntry1] : HOSPITAL DISCHARGE AND CARDIOLOGY CONSULTANT NOTES REVIEWED\par MEDICATION LIST REVIEWED AS WELL\par FALL PRECAUTIONS\par INR BEING MONITORED BY CARDIOLOGY\par HEALTHY DIET AND LIFESTYLE MODIFICATIONS\par MONITOR LAB WORK INCLUDING CBC\par CALL WITH ANY QUESTIONS, CONCERNS OR CHANGES \par SUPPORT PROVIDED\par

## 2019-06-01 NOTE — HISTORY OF PRESENT ILLNESS
[FreeTextEntry1] : FOLLOW UP POST MVR [de-identified] : MS. BURKS IS A PLEASANT 68 YO WHO PRESENTS FOR FOLLOW UP AFTER MITRAL VALVE REPLACEMENT. SHE HAD THE PROCEDURE 6 WEEKS AGO.  SHE HAD A PROLONGED HOSPITAL COURSE DUE TO LOW BP, ANEMIA. SHE WAS IN THE ICU FOR 6 DAYS WHERE SHE GOT CARDIAC PACING, TRANSFUSION. SHE IS IMPROVED SINCE DISCHARGE.  SHE CONTINUES TO FOLLOW UP WITH CARDIOTHORACIC SURGERY. SHE PARTICIPATES IN GOLFING, WALKS OFTEN, AND GOES TO THE GYM WITHOUT DYSPNEA. SHE HAS HAD NO SHORTNESS OF BREATH, HEADACHE, DIZZINESS, GI OR URINARY COMPLAINTS. ON WARFARIN.  INR BEING MONITORED BY CARDIOLOGY.  HAS HAD NO SIGNS OR SYMPTOMS OF BLEEDING.  NO FALLS.  ON VYTORIN FOR CHOLESTEROL.  NO OTHER COMPLAINTS TODAY.\par

## 2019-06-03 ENCOUNTER — LABORATORY RESULT (OUTPATIENT)
Age: 69
End: 2019-06-03

## 2019-06-07 LAB — HEMOCCULT STL QL IA: NEGATIVE

## 2019-06-10 ENCOUNTER — LABORATORY RESULT (OUTPATIENT)
Age: 69
End: 2019-06-10

## 2019-06-11 ENCOUNTER — APPOINTMENT (OUTPATIENT)
Dept: PULMONOLOGY | Facility: CLINIC | Age: 69
End: 2019-06-11
Payer: COMMERCIAL

## 2019-06-11 VITALS
BODY MASS INDEX: 24.03 KG/M2 | HEART RATE: 78 BPM | DIASTOLIC BLOOD PRESSURE: 70 MMHG | SYSTOLIC BLOOD PRESSURE: 120 MMHG | OXYGEN SATURATION: 96 % | WEIGHT: 140 LBS

## 2019-06-11 PROCEDURE — 99214 OFFICE O/P EST MOD 30 MIN: CPT

## 2019-06-11 NOTE — DISCUSSION/SUMMARY
[Stable] : stable [Asthma] : asthma [Mild Persistent] : mild persistent [None] : There are no changes in medication management [Responding to Treatment] : responding to treatment [Patient] : the patient

## 2019-06-11 NOTE — HISTORY OF PRESENT ILLNESS
[Doing Well] : doing well [Mild Persistent] : mild persistent [Well Controlled] : Well controlled [None] : The patient is currently asymptomatic [Adherent] : the patient is adherent with ~his/her~ medication regimen [PFTs] : pulmonary function tests [de-identified] : s/p MVR 4/16/19

## 2019-06-11 NOTE — CONSULT LETTER
[Dear  ___] : Dear  [unfilled], [Consult Letter:] : I had the pleasure of evaluating your patient, [unfilled]. [Please see my note below.] : Please see my note below. [Sincerely,] : Sincerely, [Prudencio Guardado MD] : Prudencio Guardado MD [FreeTextEntry3] : Prudencio Guardado MD FCCP\par Pulmonary/Critical Care/Sleep Medicine\par Department of Internal Medicine\par \par [employer]\par

## 2019-06-17 ENCOUNTER — LABORATORY RESULT (OUTPATIENT)
Age: 69
End: 2019-06-17

## 2019-06-17 LAB
APPEARANCE: CLEAR
BILIRUBIN URINE: NEGATIVE
BLOOD URINE: NEGATIVE
COLOR: YELLOW
GLUCOSE QUALITATIVE U: NEGATIVE
KETONES URINE: NEGATIVE
LEUKOCYTE ESTERASE URINE: NEGATIVE
NITRITE URINE: NEGATIVE
PH URINE: 6
PROTEIN URINE: NEGATIVE
SPECIFIC GRAVITY URINE: 1.02
UROBILINOGEN URINE: NORMAL

## 2019-06-18 LAB
25(OH)D3 SERPL-MCNC: 45.6 NG/ML
ALBUMIN SERPL ELPH-MCNC: 4.5 G/DL
ALP BLD-CCNC: 101 U/L
ALT SERPL-CCNC: 25 U/L
ANION GAP SERPL CALC-SCNC: 12 MMOL/L
AST SERPL-CCNC: 27 U/L
BASOPHILS # BLD AUTO: 0.05 K/UL
BASOPHILS NFR BLD AUTO: 1 %
BILIRUB SERPL-MCNC: 0.4 MG/DL
BUN SERPL-MCNC: 17 MG/DL
CALCIUM SERPL-MCNC: 10.9 MG/DL
CHLORIDE SERPL-SCNC: 106 MMOL/L
CHOLEST SERPL-MCNC: 169 MG/DL
CHOLEST/HDLC SERPL: 2.9 RATIO
CO2 SERPL-SCNC: 24 MMOL/L
CREAT SERPL-MCNC: 0.71 MG/DL
EOSINOPHIL # BLD AUTO: 0.22 K/UL
EOSINOPHIL NFR BLD AUTO: 4.3 %
FERRITIN SERPL-MCNC: 71 NG/ML
GLUCOSE SERPL-MCNC: 102 MG/DL
HCT VFR BLD CALC: 42 %
HDLC SERPL-MCNC: 59 MG/DL
HGB BLD-MCNC: 12.9 G/DL
IMM GRANULOCYTES NFR BLD AUTO: 0.4 %
IRON SATN MFR SERPL: 12 %
IRON SERPL-MCNC: 48 UG/DL
LDLC SERPL CALC-MCNC: 92 MG/DL
LYMPHOCYTES # BLD AUTO: 1.98 K/UL
LYMPHOCYTES NFR BLD AUTO: 38.8 %
MAN DIFF?: NORMAL
MCHC RBC-ENTMCNC: 27.1 PG
MCHC RBC-ENTMCNC: 30.7 GM/DL
MCV RBC AUTO: 88.2 FL
MONOCYTES # BLD AUTO: 0.57 K/UL
MONOCYTES NFR BLD AUTO: 11.2 %
NEUTROPHILS # BLD AUTO: 2.26 K/UL
NEUTROPHILS NFR BLD AUTO: 44.3 %
PLATELET # BLD AUTO: 280 K/UL
POTASSIUM SERPL-SCNC: 4.5 MMOL/L
PROT SERPL-MCNC: 7.7 G/DL
RBC # BLD: 4.76 M/UL
RBC # FLD: 13.7 %
SODIUM SERPL-SCNC: 142 MMOL/L
T4 FREE SERPL-MCNC: 1 NG/DL
TIBC SERPL-MCNC: 388 UG/DL
TRIGL SERPL-MCNC: 92 MG/DL
TSH SERPL-ACNC: 4.46 UIU/ML
UIBC SERPL-MCNC: 340 UG/DL
WBC # FLD AUTO: 5.1 K/UL

## 2019-06-24 LAB
INR PPP: 2.75
PT BLD: 32.1

## 2019-07-01 ENCOUNTER — LABORATORY RESULT (OUTPATIENT)
Age: 69
End: 2019-07-01

## 2019-07-02 ENCOUNTER — APPOINTMENT (OUTPATIENT)
Dept: FAMILY MEDICINE | Facility: CLINIC | Age: 69
End: 2019-07-02
Payer: COMMERCIAL

## 2019-07-02 VITALS
BODY MASS INDEX: 24.75 KG/M2 | WEIGHT: 145 LBS | HEART RATE: 80 BPM | DIASTOLIC BLOOD PRESSURE: 80 MMHG | SYSTOLIC BLOOD PRESSURE: 110 MMHG | HEIGHT: 64 IN

## 2019-07-02 DIAGNOSIS — Z87.898 PERSONAL HISTORY OF OTHER SPECIFIED CONDITIONS: ICD-10-CM

## 2019-07-02 PROCEDURE — G0439: CPT

## 2019-07-06 NOTE — PHYSICAL EXAM
[No Acute Distress] : no acute distress [Well Nourished] : well nourished [Well-Appearing] : well-appearing [Normal Sclera/Conjunctiva] : normal sclera/conjunctiva [PERRL] : pupils equal round and reactive to light [Normal Oropharynx] : the oropharynx was normal [Normal Outer Ear/Nose] : the outer ears and nose were normal in appearance [No Lymphadenopathy] : no lymphadenopathy [Supple] : supple [No Respiratory Distress] : no respiratory distress  [No Accessory Muscle Use] : no accessory muscle use [Normal Rate] : normal rate  [Regular Rhythm] : with a regular rhythm [Clear to Auscultation] : lungs were clear to auscultation bilaterally [No Murmur] : no murmur heard [Normal S1, S2] : normal S1 and S2 [Pedal Pulses Present] : the pedal pulses are present [Soft] : abdomen soft [Non Tender] : non-tender [No Edema] : there was no peripheral edema [No Focal Deficits] : no focal deficits [Normal Bowel Sounds] : normal bowel sounds [Coordination Grossly Intact] : coordination grossly intact [Deep Tendon Reflexes (DTR)] : deep tendon reflexes were 2+ and symmetric [Normal Gait] : normal gait [Speech Grossly Normal] : speech grossly normal [Normal Mood] : the mood was normal [Normal Affect] : the affect was normal [Normal] : no joint swelling and grossly normal strength and tone [No Rash] : no rash [de-identified] : VIJAY

## 2019-07-06 NOTE — HEALTH RISK ASSESSMENT
[Very Good] : ~his/her~ current health as very good [Excellent] : ~his/her~  mood as  excellent [No] : No [No falls in past year] : Patient reported no falls in the past year [None] : None [Alone] : lives alone [College] : College [# of Members in Household ___] :  household currently consist of [unfilled] member(s) [Retired] : retired [Fully functional (bathing, dressing, toileting, transferring, walking, feeding)] : Fully functional (bathing, dressing, toileting, transferring, walking, feeding) [Single] : single [Feels Safe at Home] : Feels safe at home [Fully functional (using the telephone, shopping, preparing meals, housekeeping, doing laundry, using] : Fully functional and needs no help or supervision to perform IADLs (using the telephone, shopping, preparing meals, housekeeping, doing laundry, using transportation, managing medications and managing finances) [Reports normal functional visual acuity (ie: able to read med bottle)] : Reports normal functional visual acuity [Safety elements used in home] : safety elements used in home [Carbon Monoxide Detector] : carbon monoxide detector [Smoke Detector] : smoke detector [Seat Belt] :  uses seat belt [Sunscreen] : uses sunscreen [Designated Healthcare Proxy] : Designated healthcare proxy [With Patient/Caregiver] : With Patient/Caregiver [Name: ___] : Health Care Proxy's Name: [unfilled]  [Relationship: ___] : Relationship: [unfilled] [Patient reported mammogram was normal] : Patient reported mammogram was normal [Patient reported bone density results were normal] : Patient reported bone density results were normal [Patient reported colonoscopy was normal] : Patient reported colonoscopy was normal [HIV test declined] : HIV test declined [Hepatitis C test declined] : Hepatitis C test declined [0] : 2) Feeling down, depressed, or hopeless: Not at all (0) [] : No [de-identified] : REMAINS ACTIVE [de-identified] : DIET IS GOOD OVERALL  [[id number]] : 0 [Change in mental status noted] : No change in mental status noted [Language] : denies difficulty with language [Behavior] : denies difficulty with behavior [Learning/Retaining New Information] : denies difficulty learning/retaining new information [Handling Complex Tasks] : denies difficulty handling complex tasks [Reasoning] : denies difficulty with reasoning [Spatial Ability and Orientation] : denies difficulty with spatial ability and orientation [Reports changes in hearing] : Reports no changes in hearing [Reports changes in vision] : Reports no changes in vision [Reports changes in dental health] : Reports no changes in dental health [MammogramDate] : 09/18 [MammogramComments] : [facility]  [BoneDensityDate] : 12/17 [ColonoscopyDate] : 04/15  [de-identified] : WEARS GLASSES FOR DISTANCE AND READING  [AdvancecareDate] : 07/19 [FreeTextEntry4] : SECONDARY: LUZ MARIA BURKS - BROTHER

## 2019-07-06 NOTE — ADDENDUM
[FreeTextEntry1] : I, Sulma Gamez, acted solely as a scribe for Dr. Tina Gutierrez on this date 7/2/19.

## 2019-07-06 NOTE — PLAN
[FreeTextEntry1] : LABS REVIEWED \par RE-CHECK THYROID STUDIES IN 6 WEEKS \par VISION SCREENING\par NEED MOST RECENT MAMMOGRAM REPORT \par FOLLOW UP WITH ALL SPECIALISTS AS DIRECTED\par HEALTHY DIET AND LIFESTYLE MODIFICATIONS\par FLU VACCINE IN THE FALL\par CALL WITH ANY QUESTIONS, CONCERNS OR CHANGES

## 2019-07-06 NOTE — HISTORY OF PRESENT ILLNESS
[FreeTextEntry1] : PHYSICAL [de-identified] : MS. BURKS IS A PLEASANT 70 YO PRESENTING FOR YEARLY WELLNESS EXAM. CHRONIC HISTORY OF ASTHMA, HYPERCHOLESTEROLEMIA AND OSTEOPENIA. STATUS POST MITRAL VALVE REPLACEMENT IN MAY 2019. ENERGY LEVELS IMPROVING. CONTINUES TO FOLLOW WITH CARDIOLOGY AND CARDIOTHORACIC SURGERY. ANTICIPATES DISCONTINUING COUMADIN 7/8/19. NO SIGNS OR SYMPTOMS OF BLEEDING OR BRUISING. NO BRBPR, HEMATOCHEZIA, MELENA OR HEMATURIA. ROUTINELY FOLLOWING WITH PULMONOLOGY. RECENTLY RETIRED. DOING WELL OVERALL. REMAINS ACTIVE, GOLFING SEVERAL TIMES A WEEK. DIET GOOD OVERALL, GOOD PORTION CONTROL. HAS HAD NO CHEST PAIN, SHORTNESS OF BREATH, HEADACHE, DIZZINESS, GI OR URINARY COMPLAINTS. NO OTHER COMPLAINTS TODAY. \par \par OPHTHALNOLOGY: DR. ABIODUN LANG MD

## 2019-07-08 ENCOUNTER — NON-APPOINTMENT (OUTPATIENT)
Age: 69
End: 2019-07-08

## 2019-07-08 ENCOUNTER — APPOINTMENT (OUTPATIENT)
Dept: ELECTROPHYSIOLOGY | Facility: CLINIC | Age: 69
End: 2019-07-08

## 2019-07-08 ENCOUNTER — APPOINTMENT (OUTPATIENT)
Dept: CARDIOLOGY | Facility: CLINIC | Age: 69
End: 2019-07-08
Payer: COMMERCIAL

## 2019-07-08 VITALS
HEART RATE: 66 BPM | OXYGEN SATURATION: 97 % | BODY MASS INDEX: 24.89 KG/M2 | SYSTOLIC BLOOD PRESSURE: 99 MMHG | WEIGHT: 145 LBS | DIASTOLIC BLOOD PRESSURE: 65 MMHG

## 2019-07-08 DIAGNOSIS — I34.0 NONRHEUMATIC MITRAL (VALVE) INSUFFICIENCY: ICD-10-CM

## 2019-07-08 PROCEDURE — 93000 ELECTROCARDIOGRAM COMPLETE: CPT

## 2019-07-08 PROCEDURE — 99214 OFFICE O/P EST MOD 30 MIN: CPT

## 2019-07-08 RX ORDER — WARFARIN 7.5 MG/1
7.5 TABLET ORAL DAILY
Qty: 90 | Refills: 3 | Status: DISCONTINUED | COMMUNITY
Start: 1900-01-01 | End: 2019-07-08

## 2019-07-08 RX ORDER — WARFARIN 10 MG/1
10 TABLET ORAL DAILY
Qty: 30 | Refills: 6 | Status: DISCONTINUED | COMMUNITY
Start: 2019-05-02 | End: 2019-07-08

## 2019-07-08 NOTE — PHYSICAL EXAM
[General Appearance - Well Developed] : well developed [Normal Appearance] : normal appearance [Well Groomed] : well groomed [General Appearance - Well Nourished] : well nourished [No Deformities] : no deformities [General Appearance - In No Acute Distress] : no acute distress [Normal Conjunctiva] : the conjunctiva exhibited no abnormalities [Eyelids - No Xanthelasma] : the eyelids demonstrated no xanthelasmas [Normal Oral Mucosa] : normal oral mucosa [No Oral Pallor] : no oral pallor [No Oral Cyanosis] : no oral cyanosis [Normal Jugular Venous A Waves Present] : normal jugular venous A waves present [Normal Jugular Venous V Waves Present] : normal jugular venous V waves present [No Jugular Venous Mario A Waves] : no jugular venous mario A waves [Respiration, Rhythm And Depth] : normal respiratory rhythm and effort [Exaggerated Use Of Accessory Muscles For Inspiration] : no accessory muscle use [Auscultation Breath Sounds / Voice Sounds] : lungs were clear to auscultation bilaterally [Heart Rate And Rhythm] : heart rate and rhythm were normal [Heart Sounds] : normal S1 and S2 [Murmurs] : no murmurs present [Abdomen Soft] : soft [Abdomen Tenderness] : non-tender [Abdomen Mass (___ Cm)] : no abdominal mass palpated [Abnormal Walk] : normal gait [Gait - Sufficient For Exercise Testing] : the gait was sufficient for exercise testing [Nail Clubbing] : no clubbing of the fingernails [Petechial Hemorrhages (___cm)] : no petechial hemorrhages [Cyanosis, Localized] : no localized cyanosis [Skin Color & Pigmentation] : normal skin color and pigmentation [] : no rash [No Venous Stasis] : no venous stasis [Skin Lesions] : no skin lesions [No Skin Ulcers] : no skin ulcer [No Xanthoma] : no  xanthoma was observed [Oriented To Time, Place, And Person] : oriented to person, place, and time [Affect] : the affect was normal [Mood] : the mood was normal [No Anxiety] : not feeling anxious

## 2019-07-23 ENCOUNTER — APPOINTMENT (OUTPATIENT)
Dept: FAMILY MEDICINE | Facility: CLINIC | Age: 69
End: 2019-07-23
Payer: COMMERCIAL

## 2019-07-23 ENCOUNTER — NON-APPOINTMENT (OUTPATIENT)
Age: 69
End: 2019-07-23

## 2019-07-23 VITALS
DIASTOLIC BLOOD PRESSURE: 72 MMHG | HEART RATE: 68 BPM | BODY MASS INDEX: 24.41 KG/M2 | HEIGHT: 64 IN | WEIGHT: 143 LBS | SYSTOLIC BLOOD PRESSURE: 110 MMHG

## 2019-07-23 DIAGNOSIS — R79.89 OTHER SPECIFIED ABNORMAL FINDINGS OF BLOOD CHEMISTRY: ICD-10-CM

## 2019-07-23 PROCEDURE — 93000 ELECTROCARDIOGRAM COMPLETE: CPT

## 2019-07-23 PROCEDURE — 36415 COLL VENOUS BLD VENIPUNCTURE: CPT

## 2019-07-23 PROCEDURE — 99214 OFFICE O/P EST MOD 30 MIN: CPT | Mod: 25

## 2019-07-24 ENCOUNTER — EMERGENCY (EMERGENCY)
Facility: HOSPITAL | Age: 69
LOS: 1 days | Discharge: HOME | End: 2019-07-24
Attending: EMERGENCY MEDICINE
Payer: MEDICARE

## 2019-07-24 VITALS — RESPIRATION RATE: 16 BRPM

## 2019-07-24 VITALS — WEIGHT: 143.08 LBS | HEIGHT: 64 IN

## 2019-07-24 DIAGNOSIS — D24.2 BENIGN NEOPLASM OF LEFT BREAST: Chronic | ICD-10-CM

## 2019-07-24 DIAGNOSIS — Z98.890 OTHER SPECIFIED POSTPROCEDURAL STATES: Chronic | ICD-10-CM

## 2019-07-24 DIAGNOSIS — H26.9 UNSPECIFIED CATARACT: Chronic | ICD-10-CM

## 2019-07-24 LAB
ALBUMIN SERPL ELPH-MCNC: 4.7 G/DL — SIGNIFICANT CHANGE UP (ref 3.3–5.2)
ALP SERPL-CCNC: 100 U/L — SIGNIFICANT CHANGE UP (ref 40–120)
ALT FLD-CCNC: 20 U/L — SIGNIFICANT CHANGE UP
ANION GAP SERPL CALC-SCNC: 11 MMOL/L — SIGNIFICANT CHANGE UP (ref 5–17)
APPEARANCE UR: CLEAR — SIGNIFICANT CHANGE UP
AST SERPL-CCNC: 32 U/L — HIGH
BASOPHILS # BLD AUTO: 0.04 K/UL — SIGNIFICANT CHANGE UP (ref 0–0.2)
BASOPHILS NFR BLD AUTO: 0.7 % — SIGNIFICANT CHANGE UP (ref 0–2)
BILIRUB SERPL-MCNC: 0.5 MG/DL — SIGNIFICANT CHANGE UP (ref 0.4–2)
BILIRUB UR-MCNC: NEGATIVE — SIGNIFICANT CHANGE UP
BUN SERPL-MCNC: 13 MG/DL — SIGNIFICANT CHANGE UP (ref 8–20)
CALCIUM SERPL-MCNC: 10.7 MG/DL — HIGH (ref 8.6–10.2)
CHLORIDE SERPL-SCNC: 103 MMOL/L — SIGNIFICANT CHANGE UP (ref 98–107)
CO2 SERPL-SCNC: 24 MMOL/L — SIGNIFICANT CHANGE UP (ref 22–29)
COLOR SPEC: YELLOW — SIGNIFICANT CHANGE UP
CREAT SERPL-MCNC: 0.56 MG/DL — SIGNIFICANT CHANGE UP (ref 0.5–1.3)
DIFF PNL FLD: NEGATIVE — SIGNIFICANT CHANGE UP
EOSINOPHIL # BLD AUTO: 0.12 K/UL — SIGNIFICANT CHANGE UP (ref 0–0.5)
EOSINOPHIL NFR BLD AUTO: 2.1 % — SIGNIFICANT CHANGE UP (ref 0–6)
GLUCOSE SERPL-MCNC: 103 MG/DL — SIGNIFICANT CHANGE UP (ref 70–115)
GLUCOSE UR QL: NEGATIVE MG/DL — SIGNIFICANT CHANGE UP
HCT VFR BLD CALC: 44.5 % — SIGNIFICANT CHANGE UP (ref 34.5–45)
HGB BLD-MCNC: 14 G/DL — SIGNIFICANT CHANGE UP (ref 11.5–15.5)
IMM GRANULOCYTES NFR BLD AUTO: 0.5 % — SIGNIFICANT CHANGE UP (ref 0–1.5)
KETONES UR-MCNC: NEGATIVE — SIGNIFICANT CHANGE UP
LEUKOCYTE ESTERASE UR-ACNC: NEGATIVE — SIGNIFICANT CHANGE UP
LIDOCAIN IGE QN: 32 U/L — SIGNIFICANT CHANGE UP (ref 22–51)
LYMPHOCYTES # BLD AUTO: 1.42 K/UL — SIGNIFICANT CHANGE UP (ref 1–3.3)
LYMPHOCYTES # BLD AUTO: 25 % — SIGNIFICANT CHANGE UP (ref 13–44)
MAGNESIUM SERPL-MCNC: 2 MG/DL — SIGNIFICANT CHANGE UP (ref 1.6–2.6)
MCHC RBC-ENTMCNC: 26.3 PG — LOW (ref 27–34)
MCHC RBC-ENTMCNC: 31.5 GM/DL — LOW (ref 32–36)
MCV RBC AUTO: 83.6 FL — SIGNIFICANT CHANGE UP (ref 80–100)
MONOCYTES # BLD AUTO: 0.67 K/UL — SIGNIFICANT CHANGE UP (ref 0–0.9)
MONOCYTES NFR BLD AUTO: 11.8 % — SIGNIFICANT CHANGE UP (ref 2–14)
NEUTROPHILS # BLD AUTO: 3.4 K/UL — SIGNIFICANT CHANGE UP (ref 1.8–7.4)
NEUTROPHILS NFR BLD AUTO: 59.9 % — SIGNIFICANT CHANGE UP (ref 43–77)
NITRITE UR-MCNC: NEGATIVE — SIGNIFICANT CHANGE UP
PH UR: 7 — SIGNIFICANT CHANGE UP (ref 5–8)
PHOSPHATE SERPL-MCNC: 2.9 MG/DL — SIGNIFICANT CHANGE UP (ref 2.4–4.7)
PLATELET # BLD AUTO: 269 K/UL — SIGNIFICANT CHANGE UP (ref 150–400)
POTASSIUM SERPL-MCNC: 4.3 MMOL/L — SIGNIFICANT CHANGE UP (ref 3.5–5.3)
POTASSIUM SERPL-SCNC: 4.3 MMOL/L — SIGNIFICANT CHANGE UP (ref 3.5–5.3)
PROT SERPL-MCNC: 8.7 G/DL — SIGNIFICANT CHANGE UP (ref 6.6–8.7)
PROT UR-MCNC: NEGATIVE MG/DL — SIGNIFICANT CHANGE UP
RBC # BLD: 5.32 M/UL — HIGH (ref 3.8–5.2)
RBC # FLD: 13.9 % — SIGNIFICANT CHANGE UP (ref 10.3–14.5)
SODIUM SERPL-SCNC: 138 MMOL/L — SIGNIFICANT CHANGE UP (ref 135–145)
SP GR SPEC: 1 — LOW (ref 1.01–1.02)
UROBILINOGEN FLD QL: NEGATIVE MG/DL — SIGNIFICANT CHANGE UP
WBC # BLD: 5.68 K/UL — SIGNIFICANT CHANGE UP (ref 3.8–10.5)
WBC # FLD AUTO: 5.68 K/UL — SIGNIFICANT CHANGE UP (ref 3.8–10.5)

## 2019-07-24 PROCEDURE — 80053 COMPREHEN METABOLIC PANEL: CPT

## 2019-07-24 PROCEDURE — 84100 ASSAY OF PHOSPHORUS: CPT

## 2019-07-24 PROCEDURE — 99285 EMERGENCY DEPT VISIT HI MDM: CPT

## 2019-07-24 PROCEDURE — 93005 ELECTROCARDIOGRAM TRACING: CPT

## 2019-07-24 PROCEDURE — 81003 URINALYSIS AUTO W/O SCOPE: CPT

## 2019-07-24 PROCEDURE — 84443 ASSAY THYROID STIM HORMONE: CPT

## 2019-07-24 PROCEDURE — 83735 ASSAY OF MAGNESIUM: CPT

## 2019-07-24 PROCEDURE — 93010 ELECTROCARDIOGRAM REPORT: CPT

## 2019-07-24 PROCEDURE — 83690 ASSAY OF LIPASE: CPT

## 2019-07-24 PROCEDURE — 99284 EMERGENCY DEPT VISIT MOD MDM: CPT

## 2019-07-24 PROCEDURE — 36415 COLL VENOUS BLD VENIPUNCTURE: CPT

## 2019-07-24 PROCEDURE — 85027 COMPLETE CBC AUTOMATED: CPT

## 2019-07-24 RX ORDER — SODIUM CHLORIDE 9 MG/ML
1000 INJECTION INTRAMUSCULAR; INTRAVENOUS; SUBCUTANEOUS ONCE
Refills: 0 | Status: COMPLETED | OUTPATIENT
Start: 2019-07-24 | End: 2019-07-24

## 2019-07-24 RX ADMIN — SODIUM CHLORIDE 1000 MILLILITER(S): 9 INJECTION INTRAMUSCULAR; INTRAVENOUS; SUBCUTANEOUS at 10:30

## 2019-07-24 NOTE — ED STATDOCS - CLINICAL SUMMARY MEDICAL DECISION MAKING FREE TEXT BOX
68 y/o F pt presenting with diarrhea, lightheadedness,  abdomen soft nontender pt well appearing check labs, electrolyte, hydrate and re-evaluate pt

## 2019-07-24 NOTE — ED STATDOCS - ATTENDING CONTRIBUTION TO CARE
I, Carla Nails, performed the initial face to face bedside interview with this patient regarding history of present illness, review of symptoms and relevant past medical, social and family history.  I completed an independent physical examination.  I was the initial provider who evaluated this patient. I have signed out the follow up of any pending tests (i.e. labs, radiological studies) to the ACP.  I have communicated the patient’s plan of care and disposition with the ACP.  The history, relevant review of systems, past medical and surgical history, medical decision making, and physical examination was documented by the scribe in my presence and I attest to the accuracy of the documentation.

## 2019-07-24 NOTE — ED ADULT NURSE NOTE - NS ED NURSE RECORD ANOTHER VITAL SIGN
Shaun Gotti   3/29/2018   Anticoagulation Therapy Visit    Description:  89 year old male   Provider:  Sony Berkowitz MD   Department:  Ph Anticoag           INR as of 3/29/2018     Today's INR 1.7!      Anticoagulation Summary as of 3/29/2018     INR goal 2.0-3.0   Today's INR 1.7!   Full instructions 3/29: 1 mg; 3/30: 1 mg; 3/31: 0.5 mg; 4/1: 1 mg; 4/2: 0.5 mg   Next INR check 4/3/2018    Indications   Long-term (current) use of anticoagulants [Z79.01] [Z79.01]  Acute pulmonary embolism (H) [I26.99]         Contact Numbers     Clinic Number:         March 2018 Details    Sun Mon Tue Wed Thu Fri Sat         1               2               3                 4               5               6               7               8               9               10                 11               12               13               14               15               16               17                 18               19               20               21               22               23               24                 25               26               27               28               29      1 mg   See details      30      1 mg         31      0.5 mg          Date Details   03/29 This INR check               How to take your warfarin dose     To take:  0.5 mg Take 0.5 of a 1 mg tablet.    To take:  1 mg Take 1 of the 1 mg tablets.           April 2018 Details    Sun Mon Tue Wed Thu Fri Sat     1      1 mg         2      0.5 mg         3            4               5               6               7                 8               9               10               11               12               13               14                 15               16               17               18               19               20               21                 22               23               24               25               26               27               28                 29               30                     Date  Details   No additional details    Date of next INR:  4/3/2018         How to take your warfarin dose     To take:  0.5 mg Take 0.5 of a 1 mg tablet.    To take:  1 mg Take 1 of the 1 mg tablets.            Yes

## 2019-07-24 NOTE — ED STATDOCS - PROGRESS NOTE DETAILS
PA Note: PT evaluated by intake physician. HPI/PE/ROS as noted above. Will follow up plan per intake physician. PA Note: Pt reports she feels much better after IV fluids. Pt informed of all lab results and given copy of results. Pt instructed to follow up with her PMD.

## 2019-07-24 NOTE — ED ADULT NURSE NOTE - PMH
Allergy-induced asthma, mild intermittent, uncomplicated  Denies any exacerbation or ED admission  Hyperlipidemia    MVP (mitral valve prolapse)    Osteopenia    Trigger finger

## 2019-07-24 NOTE — ED ADULT NURSE NOTE - OBJECTIVE STATEMENT
Pt arrives to ED c/o diarrhea since Friday states she feels like she is dehydrated. Pt denies n/v.. Pt A & OX4.

## 2019-07-24 NOTE — ED STATDOCS - OBJECTIVE STATEMENT
70 y/o F pt with hx of mitral valve replacement, HLD, asthma presents to ED c/o multiple episodes of diarrhea for the past 5-6 days. Pt states having 7 episodes daily; only 1 day of watery diarrhea and worse in the morning. Pt saw PMD advised to come to ED if symptoms worsened.  Last night symptoms improved however had 7 episodes of diarrhea again associated with  episode of lightheadedness while driving which prompted her to the ED. Tolerating PO. No OTC medication taken.  Denies recent travel.  Denies recent abx use. Denies sick contact. Denies fevers, chills, abdominal pain, nausea, vomiting, chest pain, blood in stool, urinary symptoms. No further complaints at this time.

## 2019-07-24 NOTE — ED ADULT TRIAGE NOTE - CHIEF COMPLAINT QUOTE
c/o diarrhea since Friday, and lightheadedness, mitral valve replacement in April in [place], seen by  PMD yesterday,

## 2019-07-26 ENCOUNTER — TRANSCRIPTION ENCOUNTER (OUTPATIENT)
Age: 69
End: 2019-07-26

## 2019-07-26 LAB
ANION GAP SERPL CALC-SCNC: 14 MMOL/L
BASOPHILS # BLD AUTO: 0.05 K/UL
BASOPHILS NFR BLD AUTO: 0.7 %
BUN SERPL-MCNC: 18 MG/DL
CALCIUM SERPL-MCNC: 10.6 MG/DL
CHLORIDE SERPL-SCNC: 102 MMOL/L
CO2 SERPL-SCNC: 23 MMOL/L
CREAT SERPL-MCNC: 0.78 MG/DL
EOSINOPHIL # BLD AUTO: 0.12 K/UL
EOSINOPHIL NFR BLD AUTO: 1.7 %
GLUCOSE SERPL-MCNC: 86 MG/DL
HCT VFR BLD CALC: 45.1 %
HGB BLD-MCNC: 13.8 G/DL
IMM GRANULOCYTES NFR BLD AUTO: 0.3 %
LYMPHOCYTES # BLD AUTO: 2.08 K/UL
LYMPHOCYTES NFR BLD AUTO: 28.7 %
MAGNESIUM SERPL-MCNC: 2 MG/DL
MAN DIFF?: NORMAL
MCHC RBC-ENTMCNC: 26.2 PG
MCHC RBC-ENTMCNC: 30.6 GM/DL
MCV RBC AUTO: 85.6 FL
MONOCYTES # BLD AUTO: 0.81 K/UL
MONOCYTES NFR BLD AUTO: 11.2 %
NEUTROPHILS # BLD AUTO: 4.16 K/UL
NEUTROPHILS NFR BLD AUTO: 57.4 %
PLATELET # BLD AUTO: 283 K/UL
POTASSIUM SERPL-SCNC: 4.4 MMOL/L
RBC # BLD: 5.27 M/UL
RBC # FLD: 14.6 %
SODIUM SERPL-SCNC: 139 MMOL/L
T4 FREE SERPL-MCNC: 1.3 NG/DL
THYROGLOB AB SERPL-ACNC: 24.9 IU/ML
THYROPEROXIDASE AB SERPL IA-ACNC: <10 IU/ML
TSH SERPL-ACNC: 2.23 UIU/ML
WBC # FLD AUTO: 7.24 K/UL

## 2019-07-28 PROBLEM — R79.89 ABNORMAL THYROID BLOOD TEST: Status: ACTIVE | Noted: 2019-07-02

## 2019-07-28 NOTE — PLAN
[FreeTextEntry1] : ? DEHYDRATION COMPONENT NOW IMPROVING\par WILL CHECK LAB WORK INCLUDING CHEM PANEL\par WILL ALSO RECHECK TFT'S\par REMAIN HYDRATED AND REST\par EKG: NSR AT 74 BPM\par DISCUSSED WITH CARDIOLOGY AND EKG REVIEWED; NO SIGNIFICANT CHANGE\par AGREES TO GO TO ER WITH ANY CONCERNS OR WORSENING\par CALL WITH ANY QUESTIONS, CONCERNS OR CHANGES \par SUPPORT GIVEN

## 2019-07-28 NOTE — REVIEW OF SYSTEMS
[Negative] : Heme/Lymph [Fainting] : no fainting [Confusion] : no confusion [de-identified] : SEE HPI

## 2019-07-28 NOTE — HISTORY OF PRESENT ILLNESS
[FreeTextEntry8] : MS. BURKS IS A PLEASANT 70 YO PRESENTING FOR AN ACUTE VISIT. COMPLAINING OF FEELING LIGHTHEADED SINCE FRIDAY MORNING.  WAS OUT IN THE HEAT AND WORKED OUT AT THE GYM.  WAS MORE WINDED THE LAST FEW DAYS AND NOW BETTER.  HAD HEADACHE AT FIRST NOW RESOLVED.  IS FEELING BETTER TODAY.  DRINKING FLUIDS AND RESTING.  HAS HAD NO FALLS OR TRAUMA.  HAD DIARRHEA ON FRIDAY, SATURDAY AND SUNDAY NOW RESOLVED.  NO N/V/D.  NO ABDOMINAL PAIN OR FEVER.  DENIES EDEMA.  CHRONIC HISTORY OF ASTHMA, HYPERCHOLESTEROLEMIA AND OSTEOPENIA. STATUS POST MITRAL VALVE REPLACEMENT IN MAY 2019.  ROUTINELY FOLLOWING UP WITH CARDIOLOGY.  HAS HAD NO CHEST PAIN, PALPITATIONS OR OTHER COMPLAINTS.

## 2019-07-28 NOTE — ADDENDUM
[FreeTextEntry1] : I, Sulma Gamez, acted solely as a scribe for Dr. Tina Gutierrez on this date 7/23/19.

## 2019-07-30 ENCOUNTER — APPOINTMENT (OUTPATIENT)
Dept: CARDIOLOGY | Facility: CLINIC | Age: 69
End: 2019-07-30
Payer: COMMERCIAL

## 2019-07-30 ENCOUNTER — NON-APPOINTMENT (OUTPATIENT)
Age: 69
End: 2019-07-30

## 2019-07-30 ENCOUNTER — OUTPATIENT (OUTPATIENT)
Dept: OUTPATIENT SERVICES | Facility: HOSPITAL | Age: 69
LOS: 1 days | End: 2019-07-30
Payer: COMMERCIAL

## 2019-07-30 ENCOUNTER — APPOINTMENT (OUTPATIENT)
Dept: CV DIAGNOSITCS | Facility: HOSPITAL | Age: 69
End: 2019-07-30

## 2019-07-30 VITALS
SYSTOLIC BLOOD PRESSURE: 147 MMHG | BODY MASS INDEX: 24.41 KG/M2 | DIASTOLIC BLOOD PRESSURE: 73 MMHG | HEIGHT: 64 IN | OXYGEN SATURATION: 97 % | WEIGHT: 143 LBS | HEART RATE: 104 BPM

## 2019-07-30 DIAGNOSIS — I25.10 ATHEROSCLEROTIC HEART DISEASE OF NATIVE CORONARY ARTERY WITHOUT ANGINA PECTORIS: ICD-10-CM

## 2019-07-30 DIAGNOSIS — I49.9 CARDIAC ARRHYTHMIA, UNSPECIFIED: ICD-10-CM

## 2019-07-30 DIAGNOSIS — H26.9 UNSPECIFIED CATARACT: Chronic | ICD-10-CM

## 2019-07-30 DIAGNOSIS — D24.2 BENIGN NEOPLASM OF LEFT BREAST: Chronic | ICD-10-CM

## 2019-07-30 DIAGNOSIS — Z98.890 OTHER SPECIFIED POSTPROCEDURAL STATES: Chronic | ICD-10-CM

## 2019-07-30 PROCEDURE — 93306 TTE W/DOPPLER COMPLETE: CPT | Mod: 26

## 2019-07-30 PROCEDURE — 93306 TTE W/DOPPLER COMPLETE: CPT

## 2019-07-30 PROCEDURE — 93000 ELECTROCARDIOGRAM COMPLETE: CPT

## 2019-07-30 PROCEDURE — 99214 OFFICE O/P EST MOD 30 MIN: CPT

## 2019-07-30 NOTE — PHYSICAL EXAM
[General Appearance - Well Developed] : well developed [Normal Appearance] : normal appearance [Well Groomed] : well groomed [General Appearance - Well Nourished] : well nourished [No Deformities] : no deformities [General Appearance - In No Acute Distress] : no acute distress [Normal Conjunctiva] : the conjunctiva exhibited no abnormalities [Eyelids - No Xanthelasma] : the eyelids demonstrated no xanthelasmas [Normal Oral Mucosa] : normal oral mucosa [No Oral Pallor] : no oral pallor [No Oral Cyanosis] : no oral cyanosis [Normal Jugular Venous A Waves Present] : normal jugular venous A waves present [Normal Jugular Venous V Waves Present] : normal jugular venous V waves present [No Jugular Venous Mario A Waves] : no jugular venous mario A waves [Respiration, Rhythm And Depth] : normal respiratory rhythm and effort [Auscultation Breath Sounds / Voice Sounds] : lungs were clear to auscultation bilaterally [Exaggerated Use Of Accessory Muscles For Inspiration] : no accessory muscle use [Heart Rate And Rhythm] : heart rate and rhythm were normal [Heart Sounds] : normal S1 and S2 [Murmurs] : no murmurs present [Abdomen Soft] : soft [Abdomen Tenderness] : non-tender [Abdomen Mass (___ Cm)] : no abdominal mass palpated [Abnormal Walk] : normal gait [Gait - Sufficient For Exercise Testing] : the gait was sufficient for exercise testing [Cyanosis, Localized] : no localized cyanosis [Nail Clubbing] : no clubbing of the fingernails [Petechial Hemorrhages (___cm)] : no petechial hemorrhages [Skin Color & Pigmentation] : normal skin color and pigmentation [] : no rash [Skin Lesions] : no skin lesions [No Venous Stasis] : no venous stasis [No Skin Ulcers] : no skin ulcer [No Xanthoma] : no  xanthoma was observed [Oriented To Time, Place, And Person] : oriented to person, place, and time [Affect] : the affect was normal [No Anxiety] : not feeling anxious [Mood] : the mood was normal

## 2019-08-07 NOTE — DISCUSSION/SUMMARY
[FreeTextEntry1] : No evidence of AF. Can stop warfarin. KASIE was oversewn. \par Decrease atenolol to 25 mg BID. \par Report BP and HR in the next few weeks. \par Continue other medications.

## 2019-08-07 NOTE — HISTORY OF PRESENT ILLNESS
[FreeTextEntry1] : Ms. Champagne presents for follow up. Since last being seen she has felt well. She golfed 9 holes. No cardiac complaints. Very rarely winded. This tends to happen when winded.

## 2019-08-12 ENCOUNTER — OTHER (OUTPATIENT)
Age: 69
End: 2019-08-12

## 2019-08-15 ENCOUNTER — APPOINTMENT (OUTPATIENT)
Dept: ELECTROPHYSIOLOGY | Facility: CLINIC | Age: 69
End: 2019-08-15

## 2019-08-21 ENCOUNTER — APPOINTMENT (OUTPATIENT)
Dept: CARDIOTHORACIC SURGERY | Facility: CLINIC | Age: 69
End: 2019-08-21

## 2019-08-22 ENCOUNTER — TRANSCRIPTION ENCOUNTER (OUTPATIENT)
Age: 69
End: 2019-08-22

## 2019-08-23 ENCOUNTER — TRANSCRIPTION ENCOUNTER (OUTPATIENT)
Age: 69
End: 2019-08-23

## 2019-08-23 ENCOUNTER — MEDICATION RENEWAL (OUTPATIENT)
Age: 69
End: 2019-08-23

## 2019-08-23 PROBLEM — I49.9 IRREGULAR CARDIAC RHYTHM: Status: ACTIVE | Noted: 2018-09-06

## 2019-08-23 RX ORDER — EZETIMIBE AND SIMVASTATIN 10; 10 MG/1; MG/1
10-10 TABLET ORAL DAILY
Qty: 90 | Refills: 3 | Status: DISCONTINUED | COMMUNITY
End: 2019-08-23

## 2019-08-23 NOTE — DISCUSSION/SUMMARY
[FreeTextEntry1] : Suspect symptoms related to rise in HR which is resulting in functional MS in setting of MV surgery. Resume atenolol. Titrate back to 50 BID.

## 2019-08-23 NOTE — HISTORY OF PRESENT ILLNESS
[FreeTextEntry1] : Ms. Champagne presents for unscheduled visit due to not feeling well\par Some shortness of breath with activity. No orthopnea. No SOB at rest. No edema. \par Today felt dizzy. She did not feel like she was going to pass out. \par She had felt well after surgery. This has started recently after the first hot day. She was dehydrated on this day.

## 2019-08-23 NOTE — PHYSICAL EXAM
[Normal Appearance] : normal appearance [General Appearance - Well Developed] : well developed [No Deformities] : no deformities [General Appearance - Well Nourished] : well nourished [Well Groomed] : well groomed [Heart Sounds] : normal S1 and S2 [General Appearance - In No Acute Distress] : no acute distress [Heart Rate And Rhythm] : heart rate and rhythm were normal [Murmurs] : no murmurs present [Respiration, Rhythm And Depth] : normal respiratory rhythm and effort [Exaggerated Use Of Accessory Muscles For Inspiration] : no accessory muscle use [Auscultation Breath Sounds / Voice Sounds] : lungs were clear to auscultation bilaterally [Normal Conjunctiva] : the conjunctiva exhibited no abnormalities [Eyelids - No Xanthelasma] : the eyelids demonstrated no xanthelasmas [Normal Oral Mucosa] : normal oral mucosa [No Oral Pallor] : no oral pallor [No Oral Cyanosis] : no oral cyanosis [Normal Jugular Venous A Waves Present] : normal jugular venous A waves present [Normal Jugular Venous V Waves Present] : normal jugular venous V waves present [No Jugular Venous Mario A Waves] : no jugular venous mario A waves [Abdomen Soft] : soft [Abdomen Tenderness] : non-tender [Abdomen Mass (___ Cm)] : no abdominal mass palpated [Abnormal Walk] : normal gait [Gait - Sufficient For Exercise Testing] : the gait was sufficient for exercise testing [Cyanosis, Localized] : no localized cyanosis [Nail Clubbing] : no clubbing of the fingernails [Petechial Hemorrhages (___cm)] : no petechial hemorrhages [] : no rash [Skin Color & Pigmentation] : normal skin color and pigmentation [Skin Lesions] : no skin lesions [No Venous Stasis] : no venous stasis [No Xanthoma] : no  xanthoma was observed [No Skin Ulcers] : no skin ulcer [Oriented To Time, Place, And Person] : oriented to person, place, and time [Affect] : the affect was normal [Mood] : the mood was normal [No Anxiety] : not feeling anxious

## 2019-08-23 NOTE — DISCUSSION/SUMMARY
[FreeTextEntry1] : Progressing well post op. \par Amandeepnwilder present medications although patient is anxious to be off atenolol. \par She is to see Dr. Pugh as well to discuss long term AC strategy.

## 2019-08-23 NOTE — HISTORY OF PRESENT ILLNESS
[FreeTextEntry1] : Mitral valve repair 4/16. Today is 4 weeks\par Post op AF and torsades. in the hospital. \par Cath pre-op normal cors\par Echo normal LV function. \par She had severe MR. With no symptoms. \par She has felt well since surgery. She is walking. Pushes a little but not too much. Feels normalish. \par She was asymptomatic with AF \par Was on midodrine but off of it and BP was stable.\par Still a little winded with significant exertion. \par patch on. Lexy.

## 2019-08-26 ENCOUNTER — TRANSCRIPTION ENCOUNTER (OUTPATIENT)
Age: 69
End: 2019-08-26

## 2019-08-28 ENCOUNTER — APPOINTMENT (OUTPATIENT)
Dept: MRI IMAGING | Facility: CLINIC | Age: 69
End: 2019-08-28
Payer: MEDICARE

## 2019-08-28 ENCOUNTER — OUTPATIENT (OUTPATIENT)
Dept: OUTPATIENT SERVICES | Facility: HOSPITAL | Age: 69
LOS: 1 days | End: 2019-08-28
Payer: MEDICARE

## 2019-08-28 DIAGNOSIS — R42 DIZZINESS AND GIDDINESS: ICD-10-CM

## 2019-08-28 DIAGNOSIS — H26.9 UNSPECIFIED CATARACT: Chronic | ICD-10-CM

## 2019-08-28 DIAGNOSIS — Z98.890 OTHER SPECIFIED POSTPROCEDURAL STATES: Chronic | ICD-10-CM

## 2019-08-28 DIAGNOSIS — D24.2 BENIGN NEOPLASM OF LEFT BREAST: Chronic | ICD-10-CM

## 2019-08-28 DIAGNOSIS — I34.8 OTHER NONRHEUMATIC MITRAL VALVE DISORDERS: ICD-10-CM

## 2019-08-28 PROCEDURE — 70551 MRI BRAIN STEM W/O DYE: CPT | Mod: 26

## 2019-08-28 PROCEDURE — 70551 MRI BRAIN STEM W/O DYE: CPT

## 2019-09-03 ENCOUNTER — APPOINTMENT (OUTPATIENT)
Dept: CARDIOTHORACIC SURGERY | Facility: CLINIC | Age: 69
End: 2019-09-03
Payer: MEDICARE

## 2019-09-03 VITALS
RESPIRATION RATE: 12 BRPM | DIASTOLIC BLOOD PRESSURE: 70 MMHG | SYSTOLIC BLOOD PRESSURE: 126 MMHG | TEMPERATURE: 98 F | HEART RATE: 56 BPM | OXYGEN SATURATION: 99 %

## 2019-09-03 VITALS — DIASTOLIC BLOOD PRESSURE: 78 MMHG | SYSTOLIC BLOOD PRESSURE: 131 MMHG

## 2019-09-03 VITALS — DIASTOLIC BLOOD PRESSURE: 78 MMHG | SYSTOLIC BLOOD PRESSURE: 145 MMHG

## 2019-09-03 VITALS — HEIGHT: 64 IN | BODY MASS INDEX: 23.73 KG/M2 | WEIGHT: 139 LBS

## 2019-09-03 DIAGNOSIS — R42 DIZZINESS AND GIDDINESS: ICD-10-CM

## 2019-09-03 PROCEDURE — 99212 OFFICE O/P EST SF 10 MIN: CPT

## 2019-09-11 RX ORDER — ATENOLOL 50 MG/1
50 TABLET ORAL
Qty: 180 | Refills: 2 | Status: COMPLETED | COMMUNITY
End: 2019-09-11

## 2019-09-17 ENCOUNTER — APPOINTMENT (OUTPATIENT)
Dept: FAMILY MEDICINE | Facility: CLINIC | Age: 69
End: 2019-09-17
Payer: MEDICARE

## 2019-09-17 ENCOUNTER — TRANSCRIPTION ENCOUNTER (OUTPATIENT)
Age: 69
End: 2019-09-17

## 2019-09-17 VITALS
HEIGHT: 64 IN | TEMPERATURE: 98.5 F | BODY MASS INDEX: 23.05 KG/M2 | OXYGEN SATURATION: 98 % | SYSTOLIC BLOOD PRESSURE: 140 MMHG | HEART RATE: 87 BPM | DIASTOLIC BLOOD PRESSURE: 72 MMHG | WEIGHT: 135 LBS

## 2019-09-17 DIAGNOSIS — F41.9 ANXIETY DISORDER, UNSPECIFIED: ICD-10-CM

## 2019-09-17 PROCEDURE — G0008: CPT

## 2019-09-17 PROCEDURE — 99213 OFFICE O/P EST LOW 20 MIN: CPT | Mod: 25

## 2019-09-17 PROCEDURE — 90662 IIV NO PRSV INCREASED AG IM: CPT

## 2019-09-22 PROBLEM — F41.9 ANXIETY: Status: ACTIVE | Noted: 2019-09-22

## 2019-09-22 NOTE — PHYSICAL EXAM
[No Acute Distress] : no acute distress [Well Nourished] : well nourished [Well-Appearing] : well-appearing [No Accessory Muscle Use] : no accessory muscle use [Clear to Auscultation] : lungs were clear to auscultation bilaterally [No Respiratory Distress] : no respiratory distress  [Normal Rate] : normal rate  [No Murmur] : no murmur heard [Regular Rhythm] : with a regular rhythm [Normal S1, S2] : normal S1 and S2 [Pedal Pulses Present] : the pedal pulses are present [No Edema] : there was no peripheral edema [Speech Grossly Normal] : speech grossly normal [Normal Affect] : the affect was normal [Memory Grossly Normal] : memory grossly normal [Alert and Oriented x3] : oriented to person, place, and time

## 2019-09-22 NOTE — REVIEW OF SYSTEMS
[Anxiety] : anxiety [Fever] : no fever [Chills] : no chills [Fatigue] : no fatigue [Chest Pain] : no chest pain [Palpitations] : no palpitations [Shortness Of Breath] : no shortness of breath [Lower Ext Edema] : no lower extremity edema [Wheezing] : no wheezing [Cough] : no cough [Suicidal] : not suicidal [Depression] : no depression

## 2019-09-22 NOTE — HISTORY OF PRESENT ILLNESS
[FreeTextEntry1] : F/U FROM CARDIOTHORACIC SURGERY APPOINTMENT [de-identified] : MS. BURKS IS A PLEASANT 68 YO PRESENTING FOR FOLLOW-UP. CHRONIC HISTORY OF ASTHMA, HYPERCHOLESTEROLEMIA AND OSTEOPENIA. STATUS POST MVR PROCEDURE. RECENTLY SAW CARDIOTHORACIC SURGEON FOR FOLLOW-UP POST-PROCEDURE. DUE TO LIGHTHEADEDNESS WAS SWITCHED TO ATENOLOL AND HAD REPEAT ECHOCARDIOGRAM.  IS FEELING BETTER WITH MEDICATION CHANGE.  NO HEADACHES, CHEST PAIN, PALPITATIONS, OR SHORTNESS OF BREATH. SHE WAS PRESCRIBED XANAX TO TAKE FOR ANXIOUS FEELING.  HAS BEEN TAKING DAILY FOR WEEKS.  WAS ADVISED TAPER OFF BY CARDIOLOGIST BUT WOULD LIKE MY OPINION ON HOW TO DO SO.  SHE IS STARTING TO BECOME ACTIVE AGAIN WITH WALKING AND GOLF. HAS HAD NO GI OR URINARY COMPLAINTS. NO OTHER COMPLAINTS TODAY.

## 2019-09-22 NOTE — PLAN
[FreeTextEntry1] : FLU VACCINE GIVEN AND TOLERATED WELL\par DISCUSSED SLOW TAPER OFF XANAX\par STRESS REDUCTION\par FOLLOW-UP WITH ALL SPECIALISTS AS DIRECTED\par CALL WITH ANY QUESTIONS, CONCERNS OR CHANGES \par

## 2019-09-26 ENCOUNTER — FORM ENCOUNTER (OUTPATIENT)
Age: 69
End: 2019-09-26

## 2019-09-27 ENCOUNTER — APPOINTMENT (OUTPATIENT)
Dept: ULTRASOUND IMAGING | Facility: CLINIC | Age: 69
End: 2019-09-27
Payer: MEDICARE

## 2019-09-27 ENCOUNTER — OUTPATIENT (OUTPATIENT)
Dept: OUTPATIENT SERVICES | Facility: HOSPITAL | Age: 69
LOS: 1 days | End: 2019-09-27
Payer: MEDICARE

## 2019-09-27 ENCOUNTER — APPOINTMENT (OUTPATIENT)
Dept: MAMMOGRAPHY | Facility: CLINIC | Age: 69
End: 2019-09-27
Payer: MEDICARE

## 2019-09-27 DIAGNOSIS — Z98.890 OTHER SPECIFIED POSTPROCEDURAL STATES: Chronic | ICD-10-CM

## 2019-09-27 DIAGNOSIS — D24.2 BENIGN NEOPLASM OF LEFT BREAST: Chronic | ICD-10-CM

## 2019-09-27 DIAGNOSIS — H26.9 UNSPECIFIED CATARACT: Chronic | ICD-10-CM

## 2019-09-27 DIAGNOSIS — R92.8 OTHER ABNORMAL AND INCONCLUSIVE FINDINGS ON DIAGNOSTIC IMAGING OF BREAST: ICD-10-CM

## 2019-09-27 PROCEDURE — 77063 BREAST TOMOSYNTHESIS BI: CPT | Mod: 26,59

## 2019-09-27 PROCEDURE — 77067 SCR MAMMO BI INCL CAD: CPT | Mod: 26,59

## 2019-09-27 PROCEDURE — 77065 DX MAMMO INCL CAD UNI: CPT

## 2019-09-27 PROCEDURE — 77065 DX MAMMO INCL CAD UNI: CPT | Mod: 26,GG,RT

## 2019-09-27 PROCEDURE — 76641 ULTRASOUND BREAST COMPLETE: CPT | Mod: 26,50

## 2019-09-27 PROCEDURE — 77067 SCR MAMMO BI INCL CAD: CPT

## 2019-09-27 PROCEDURE — 76641 ULTRASOUND BREAST COMPLETE: CPT

## 2019-09-27 PROCEDURE — 77063 BREAST TOMOSYNTHESIS BI: CPT

## 2019-10-03 ENCOUNTER — CLINICAL ADVICE (OUTPATIENT)
Age: 69
End: 2019-10-03

## 2019-10-07 NOTE — DISCHARGE NOTE PROVIDER - NSDCHHCONTACT_GEN_ALL_CORE_FT
[Joint Stiffness] : joint stiffness [Joint Pain] : joint pain [Negative] : Heme/Lymph As certified below, I, or a nurse practitioner or physician assistant working with me, had a face-to-face encounter that meets the physician face-to-face encounter requirements.

## 2019-10-14 ENCOUNTER — APPOINTMENT (OUTPATIENT)
Dept: CARDIOLOGY | Facility: CLINIC | Age: 69
End: 2019-10-14

## 2019-10-15 RX ORDER — CETIRIZINE HCL 10 MG
TABLET ORAL DAILY
Refills: 0 | Status: ACTIVE | COMMUNITY

## 2019-10-15 RX ORDER — ASPIRIN ENTERIC COATED TABLETS 81 MG 81 MG/1
81 TABLET, DELAYED RELEASE ORAL
Qty: 90 | Refills: 0 | Status: ACTIVE | COMMUNITY

## 2019-10-17 ENCOUNTER — NON-APPOINTMENT (OUTPATIENT)
Age: 69
End: 2019-10-17

## 2019-10-17 ENCOUNTER — APPOINTMENT (OUTPATIENT)
Dept: CARDIOLOGY | Facility: CLINIC | Age: 69
End: 2019-10-17
Payer: MEDICARE

## 2019-10-17 VITALS
HEIGHT: 64 IN | RESPIRATION RATE: 16 BRPM | HEART RATE: 66 BPM | WEIGHT: 135 LBS | SYSTOLIC BLOOD PRESSURE: 118 MMHG | BODY MASS INDEX: 23.05 KG/M2 | DIASTOLIC BLOOD PRESSURE: 80 MMHG | OXYGEN SATURATION: 98 %

## 2019-10-17 DIAGNOSIS — I34.0 NONRHEUMATIC MITRAL (VALVE) INSUFFICIENCY: ICD-10-CM

## 2019-10-17 PROCEDURE — 93000 ELECTROCARDIOGRAM COMPLETE: CPT

## 2019-10-17 PROCEDURE — 99215 OFFICE O/P EST HI 40 MIN: CPT

## 2019-10-17 RX ORDER — METOPROLOL SUCCINATE 50 MG/1
50 TABLET, EXTENDED RELEASE ORAL
Qty: 90 | Refills: 0 | Status: DISCONTINUED | COMMUNITY
Start: 2019-09-25 | End: 2019-10-17

## 2019-10-17 NOTE — ASSESSMENT
[FreeTextEntry1] : ECG: SR, septal Q waves, leftward axis\par \par LHC (pre-op, 4/2019)\par 1. 30% mid LAD stenosis

## 2019-10-17 NOTE — HISTORY OF PRESENT ILLNESS
[FreeTextEntry1] : Patient is a 70yo F with HLD, MVP with severe MR s/p bio MVR, KASIE ligation and MAZE 4/2019, post op AF here for cardiac evaluation. Has been having issues with dizziness and beta blocker increased to treat BP and lower heart rate due to increased gradient across MVR. States when weaned off atenolol had incapacitating episodes of lightheadedness. Got very anxious from symptoms as well. Initially thought related to heat and went to PMD and ED. Subsequently had ECG done and was sinus tach with ectopy. Put on atenolol and then changed to metoprolol, now up to 100mg ER bid and tolerating well. Feeling better but not 100%. Was expecting to do much better post-op than she has done. 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 Lives alone. Plans to go to [place] for winter months. \par \par BP at home ranging mostly 100's, sometimes up to 148 and once down to 88. HR 60-70s. \par \par ROS: GI negative, all others negative

## 2019-10-17 NOTE — DISCUSSION/SUMMARY
[FreeTextEntry1] : Patient is a 70yo F with HLD, MVP with severe MR s/p bio MVR, KASIE ligation and MAZE 4/2019, mild CAD (30% LAD stenosis cath 2019) post op AF here for cardiac evaluation. Post op course complicated and now seems to be slowly improving. REcent symptoms may be related to tachycardia (partly from beta blocker withdrawal when taken off) leading to increased MV gradients. Also having overall slow recovery but expect to continue to improve. NOt much room to increase metoprolol at this time. Recommend cardiac rehab, may also help get resting heart rate down\par \par \par 1. EST prior to starting cardiac rehab, will be going to [place] but would benefit from starting\par 2. Continue ASA/statin/zetia, has mild CAD. Lipids controlled last spring, recheck with PMD\par 3. Continue current antihypertensives, blood pressure/HR  well controlled . Refilled metoprolol\par 4. Increase physical activity as tolerated \par 5. Will re-evaluate MVR next year for surveillance\par 6. NO signs recurrent AF, was post op and had MAZE. Now off A/C, also had KASIE ligation. \par 7. Avoid dehydration \par 8. Follow up 2 months

## 2019-10-17 NOTE — PHYSICAL EXAM
[General Appearance - Well Nourished] : well nourished [General Appearance - Well Developed] : well developed [Normal Conjunctiva] : the conjunctiva exhibited no abnormalities [Normal Oropharynx] : normal oropharynx [Heart Rate And Rhythm] : heart rate and rhythm were normal [Normal Jugular Venous V Waves Present] : normal jugular venous V waves present [] : no respiratory distress [Heart Sounds] : normal S1 and S2 [Respiration, Rhythm And Depth] : normal respiratory rhythm and effort [Auscultation Breath Sounds / Voice Sounds] : lungs were clear to auscultation bilaterally [Bowel Sounds] : normal bowel sounds [Abdomen Soft] : soft [Abdomen Tenderness] : non-tender [Abnormal Walk] : normal gait [Nail Clubbing] : no clubbing of the fingernails [Cyanosis, Localized] : no localized cyanosis [Skin Color & Pigmentation] : normal skin color and pigmentation [Skin Turgor] : normal skin turgor [Oriented To Time, Place, And Person] : oriented to person, place, and time [Affect] : the affect was normal [FreeTextEntry1] : no edema

## 2019-10-18 ENCOUNTER — INBOUND DOCUMENT (OUTPATIENT)
Age: 69
End: 2019-10-18

## 2019-10-21 ENCOUNTER — APPOINTMENT (OUTPATIENT)
Dept: CARDIOLOGY | Facility: CLINIC | Age: 69
End: 2019-10-21
Payer: MEDICARE

## 2019-10-21 PROCEDURE — 93015 CV STRESS TEST SUPVJ I&R: CPT

## 2019-10-22 ENCOUNTER — APPOINTMENT (OUTPATIENT)
Dept: CARDIOLOGY | Facility: CLINIC | Age: 69
End: 2019-10-22

## 2019-10-23 ENCOUNTER — APPOINTMENT (OUTPATIENT)
Dept: CARDIOLOGY | Facility: CLINIC | Age: 69
End: 2019-10-23
Payer: MEDICARE

## 2019-10-23 PROCEDURE — 93798 PHYS/QHP OP CAR RHAB W/ECG: CPT

## 2019-10-25 ENCOUNTER — APPOINTMENT (OUTPATIENT)
Dept: CARDIOLOGY | Facility: CLINIC | Age: 69
End: 2019-10-25
Payer: MEDICARE

## 2019-10-25 PROCEDURE — 93798 PHYS/QHP OP CAR RHAB W/ECG: CPT

## 2019-10-28 ENCOUNTER — APPOINTMENT (OUTPATIENT)
Dept: CARDIOLOGY | Facility: CLINIC | Age: 69
End: 2019-10-28
Payer: MEDICARE

## 2019-10-28 PROCEDURE — 93798 PHYS/QHP OP CAR RHAB W/ECG: CPT

## 2019-10-30 ENCOUNTER — APPOINTMENT (OUTPATIENT)
Dept: CARDIOLOGY | Facility: CLINIC | Age: 69
End: 2019-10-30
Payer: MEDICARE

## 2019-10-30 PROCEDURE — 93798 PHYS/QHP OP CAR RHAB W/ECG: CPT

## 2019-11-01 ENCOUNTER — APPOINTMENT (OUTPATIENT)
Dept: CARDIOLOGY | Facility: CLINIC | Age: 69
End: 2019-11-01
Payer: MEDICARE

## 2019-11-01 PROCEDURE — 93798 PHYS/QHP OP CAR RHAB W/ECG: CPT

## 2019-11-04 ENCOUNTER — APPOINTMENT (OUTPATIENT)
Dept: CARDIOLOGY | Facility: CLINIC | Age: 69
End: 2019-11-04
Payer: MEDICARE

## 2019-11-04 ENCOUNTER — APPOINTMENT (OUTPATIENT)
Dept: DERMATOLOGY | Facility: CLINIC | Age: 69
End: 2019-11-04
Payer: MEDICARE

## 2019-11-04 PROCEDURE — 17000 DESTRUCT PREMALG LESION: CPT

## 2019-11-04 PROCEDURE — 93798 PHYS/QHP OP CAR RHAB W/ECG: CPT

## 2019-11-04 PROCEDURE — 99213 OFFICE O/P EST LOW 20 MIN: CPT | Mod: 25

## 2019-11-06 ENCOUNTER — APPOINTMENT (OUTPATIENT)
Dept: CARDIOLOGY | Facility: CLINIC | Age: 69
End: 2019-11-06
Payer: MEDICARE

## 2019-11-06 PROCEDURE — 93798 PHYS/QHP OP CAR RHAB W/ECG: CPT

## 2019-11-08 ENCOUNTER — APPOINTMENT (OUTPATIENT)
Dept: CARDIOLOGY | Facility: CLINIC | Age: 69
End: 2019-11-08
Payer: MEDICARE

## 2019-11-08 PROCEDURE — 93798 PHYS/QHP OP CAR RHAB W/ECG: CPT

## 2019-11-11 ENCOUNTER — APPOINTMENT (OUTPATIENT)
Dept: CARDIOLOGY | Facility: CLINIC | Age: 69
End: 2019-11-11
Payer: MEDICARE

## 2019-11-11 ENCOUNTER — APPOINTMENT (OUTPATIENT)
Dept: CARDIOLOGY | Facility: CLINIC | Age: 69
End: 2019-11-11

## 2019-11-11 PROCEDURE — 93798 PHYS/QHP OP CAR RHAB W/ECG: CPT

## 2019-11-13 ENCOUNTER — APPOINTMENT (OUTPATIENT)
Dept: CARDIOLOGY | Facility: CLINIC | Age: 69
End: 2019-11-13
Payer: MEDICARE

## 2019-11-13 ENCOUNTER — APPOINTMENT (OUTPATIENT)
Dept: PULMONOLOGY | Facility: CLINIC | Age: 69
End: 2019-11-13
Payer: MEDICARE

## 2019-11-13 VITALS
DIASTOLIC BLOOD PRESSURE: 76 MMHG | HEART RATE: 88 BPM | RESPIRATION RATE: 16 BRPM | SYSTOLIC BLOOD PRESSURE: 124 MMHG | OXYGEN SATURATION: 96 %

## 2019-11-13 VITALS — BODY MASS INDEX: 22.92 KG/M2 | HEIGHT: 63.5 IN | WEIGHT: 131 LBS

## 2019-11-13 PROCEDURE — 99214 OFFICE O/P EST MOD 30 MIN: CPT | Mod: 25

## 2019-11-13 PROCEDURE — 94010 BREATHING CAPACITY TEST: CPT

## 2019-11-13 PROCEDURE — 93798 PHYS/QHP OP CAR RHAB W/ECG: CPT

## 2019-11-13 NOTE — CONSULT LETTER
[Consult Letter:] : I had the pleasure of evaluating your patient, [unfilled]. [Dear  ___] : Dear  [unfilled], [Sincerely,] : Sincerely, [Please see my note below.] : Please see my note below. [Prudencio Guardado MD] : Prudencio Guardado MD [FreeTextEntry3] : Prudencio Guardado MD FCCP\par Pulmonary/Critical Care/Sleep Medicine\par Department of Internal Medicine\par \par [facility] [employer]\par

## 2019-11-13 NOTE — PHYSICAL EXAM
[Normal Appearance] : normal appearance [Well Groomed] : well groomed [General Appearance - Well Developed] : well developed [No Deformities] : no deformities [General Appearance - In No Acute Distress] : no acute distress [General Appearance - Well Nourished] : well nourished [Normal Conjunctiva] : the conjunctiva exhibited no abnormalities [Eyelids - No Xanthelasma] : the eyelids demonstrated no xanthelasmas [Normal Oropharynx] : normal oropharynx [Neck Appearance] : the appearance of the neck was normal [Thyroid Diffuse Enlargement] : the thyroid was not enlarged [Jugular Venous Distention Increased] : there was no jugular-venous distention [Neck Cervical Mass (___cm)] : no neck mass was observed [Thyroid Nodule] : there were no palpable thyroid nodules [Heart Sounds] : normal S1 and S2 [Heart Rate And Rhythm] : heart rate and rhythm were normal [Murmurs] : no murmurs present [Respiration, Rhythm And Depth] : normal respiratory rhythm and effort [Exaggerated Use Of Accessory Muscles For Inspiration] : no accessory muscle use [Kyphosis] : kyphosis [Surgical scars] : surgical scars [Abdomen Soft] : soft [Abdomen Tenderness] : non-tender [Abdomen Mass (___ Cm)] : no abdominal mass palpated [Abnormal Walk] : normal gait [Nail Clubbing] : no clubbing of the fingernails [Gait - Sufficient For Exercise Testing] : the gait was sufficient for exercise testing [Cyanosis, Localized] : no localized cyanosis [Petechial Hemorrhages (___cm)] : no petechial hemorrhages [No Venous Stasis] : no venous stasis [Skin Lesions] : no skin lesions [No Xanthoma] : no  xanthoma was observed [No Skin Ulcers] : no skin ulcer [Deep Tendon Reflexes (DTR)] : deep tendon reflexes were 2+ and symmetric [Sensation] : the sensory exam was normal to light touch and pinprick [Skin Color & Pigmentation] : normal skin color and pigmentation [No Focal Deficits] : no focal deficits [Skin Turgor] : normal skin turgor [] : no rash [FreeTextEntry1] : scattered wheezes bilaterally

## 2019-11-13 NOTE — HISTORY OF PRESENT ILLNESS
[Mild Persistent] : mild persistent [Doing Well] : doing well [Well Controlled] : Well controlled [Adherent] : the patient is adherent with ~his/her~ medication regimen [None] : The patient is currently asymptomatic [PFTs] : pulmonary function tests [de-identified] : s/p MVR 4/16/19

## 2019-11-13 NOTE — DISCUSSION/SUMMARY
[Responding to Treatment] : responding to treatment [Stable] : stable [Asthma] : asthma [Mild Persistent] : mild persistent [None] : There are no changes in medication management [Patient] : the patient

## 2019-11-15 ENCOUNTER — APPOINTMENT (OUTPATIENT)
Dept: CARDIOLOGY | Facility: CLINIC | Age: 69
End: 2019-11-15
Payer: MEDICARE

## 2019-11-15 PROCEDURE — 93798 PHYS/QHP OP CAR RHAB W/ECG: CPT

## 2019-11-16 ENCOUNTER — TRANSCRIPTION ENCOUNTER (OUTPATIENT)
Age: 69
End: 2019-11-16

## 2019-11-18 ENCOUNTER — APPOINTMENT (OUTPATIENT)
Dept: CARDIOLOGY | Facility: CLINIC | Age: 69
End: 2019-11-18
Payer: MEDICARE

## 2019-11-18 PROCEDURE — 93798 PHYS/QHP OP CAR RHAB W/ECG: CPT

## 2019-11-19 ENCOUNTER — APPOINTMENT (OUTPATIENT)
Dept: CARDIOLOGY | Facility: CLINIC | Age: 69
End: 2019-11-19
Payer: MEDICARE

## 2019-11-19 ENCOUNTER — NON-APPOINTMENT (OUTPATIENT)
Age: 69
End: 2019-11-19

## 2019-11-19 VITALS
BODY MASS INDEX: 22.36 KG/M2 | DIASTOLIC BLOOD PRESSURE: 80 MMHG | HEIGHT: 64 IN | WEIGHT: 131 LBS | SYSTOLIC BLOOD PRESSURE: 119 MMHG | HEART RATE: 95 BPM | RESPIRATION RATE: 16 BRPM

## 2019-11-19 PROCEDURE — 93000 ELECTROCARDIOGRAM COMPLETE: CPT

## 2019-11-19 PROCEDURE — 93306 TTE W/DOPPLER COMPLETE: CPT

## 2019-11-19 PROCEDURE — 99214 OFFICE O/P EST MOD 30 MIN: CPT

## 2019-11-19 NOTE — PHYSICAL EXAM
[General Appearance - Well Developed] : well developed [General Appearance - Well Nourished] : well nourished [Normal Oropharynx] : normal oropharynx [Normal Conjunctiva] : the conjunctiva exhibited no abnormalities [Normal Jugular Venous V Waves Present] : normal jugular venous V waves present [] : no respiratory distress [Respiration, Rhythm And Depth] : normal respiratory rhythm and effort [Heart Rate And Rhythm] : heart rate and rhythm were normal [Heart Sounds] : normal S1 and S2 [Bowel Sounds] : normal bowel sounds [Abdomen Soft] : soft [Abdomen Tenderness] : non-tender [Abnormal Walk] : normal gait [Nail Clubbing] : no clubbing of the fingernails [Cyanosis, Localized] : no localized cyanosis [Skin Turgor] : normal skin turgor [Skin Color & Pigmentation] : normal skin color and pigmentation [Affect] : the affect was normal [Oriented To Time, Place, And Person] : oriented to person, place, and time [FreeTextEntry1] : no edema

## 2019-11-19 NOTE — HISTORY OF PRESENT ILLNESS
[FreeTextEntry1] : Patient is a 68yo F with HLD, MVP with severe MR s/p bio MVR, KASIE ligation and MAZE 4/2019, post op AF here for cardiac evaluation. Has been having issues with dizziness and beta blocker increased to treat BP and lower heart rate due to increased gradient across MVR. States when weaned off atenolol had incapacitating episodes of lightheadedness. Got very anxious from symptoms as well. Initially thought related to heat and went to PMD and ED. Subsequently had ECG done and was sinus tach with ectopy. Put on atenolol and then changed to metoprolol, now up to 100mg ER bid and tolerating well. Feeling better but not 100%. Was expecting to do much better post-op than she has done. \par \par Started cardiac rehab after last visit. Was doing well, noted to be in AF yesterday at cardiac rehab. Otherwise feeling much better, energy level improving. Recent URI which exacerbated her asthma but recovering. Was on steroids and antibiotics. 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 Lives alone. Plans to go to [place] for winter months. \par \par ROS: GI and  negative

## 2019-11-19 NOTE — DISCUSSION/SUMMARY
[FreeTextEntry1] : Patient is a 70yo F with HLD, MVP with severe MR s/p bio MVR, KASIE ligation and MAZE 4/2019, mild CAD (30% LAD stenosis cath 2019) post op AF here for cardiac evaluation. Post op course complicated and now seems to be slowly improving. REcent symptoms may be related to tachycardia (partly from beta blocker withdrawal when taken off) leading to increased MV gradients. Also having overall slow recovery but expect to continue to improve. Had PAF during cardiac rehab, also PVCs but no symtpoms. OVerall feeling better and recovering. HR mildly increased since last visit but likely related to recent URI/asthma and steroids.  ECG today with ectopy \par \par \par 1. Continue current dose beta blocker, if difficult to control recurrent AF will consider AAT\par 2. Continue ASA/statin/zetia, has mild CAD. Lipids controlled last spring, recheck with PMD\par 3. Continue current antihypertensives, blood pressure/HR  well controlled. Mild increase in HR from URI\par 4. Increase physical activity as tolerated \par 5. Will re-evaluate MVR and gradients prior to leaving for [place]\par 6. Now off A/C, also had KASIE ligation so continue ASA only. \par 7. Avoid dehydration \par 8. Follow up 6 months (when returns from [place])\par 9. Would benefit from continued cardiac rehab while in [place] and gave patient prescription

## 2019-11-20 ENCOUNTER — APPOINTMENT (OUTPATIENT)
Dept: CARDIOLOGY | Facility: CLINIC | Age: 69
End: 2019-11-20
Payer: MEDICARE

## 2019-11-20 PROCEDURE — 93798 PHYS/QHP OP CAR RHAB W/ECG: CPT

## 2019-11-22 ENCOUNTER — APPOINTMENT (OUTPATIENT)
Dept: CARDIOLOGY | Facility: CLINIC | Age: 69
End: 2019-11-22
Payer: MEDICARE

## 2019-11-22 PROCEDURE — 93798 PHYS/QHP OP CAR RHAB W/ECG: CPT

## 2019-11-22 RX ORDER — METOPROLOL SUCCINATE 100 MG/1
100 TABLET, EXTENDED RELEASE ORAL
Refills: 0 | Status: COMPLETED | COMMUNITY
End: 2019-11-22

## 2019-11-25 ENCOUNTER — APPOINTMENT (OUTPATIENT)
Dept: CARDIOLOGY | Facility: CLINIC | Age: 69
End: 2019-11-25
Payer: MEDICARE

## 2019-11-25 PROCEDURE — 93798 PHYS/QHP OP CAR RHAB W/ECG: CPT

## 2019-11-27 ENCOUNTER — APPOINTMENT (OUTPATIENT)
Dept: CARDIOLOGY | Facility: CLINIC | Age: 69
End: 2019-11-27
Payer: MEDICARE

## 2019-11-27 PROCEDURE — 93798 PHYS/QHP OP CAR RHAB W/ECG: CPT

## 2019-12-02 ENCOUNTER — APPOINTMENT (OUTPATIENT)
Dept: CARDIOLOGY | Facility: CLINIC | Age: 69
End: 2019-12-02
Payer: MEDICARE

## 2019-12-02 PROCEDURE — 93798 PHYS/QHP OP CAR RHAB W/ECG: CPT

## 2019-12-04 ENCOUNTER — APPOINTMENT (OUTPATIENT)
Dept: CARDIOLOGY | Facility: CLINIC | Age: 69
End: 2019-12-04

## 2019-12-06 ENCOUNTER — APPOINTMENT (OUTPATIENT)
Dept: CARDIOLOGY | Facility: CLINIC | Age: 69
End: 2019-12-06

## 2019-12-09 ENCOUNTER — APPOINTMENT (OUTPATIENT)
Dept: CARDIOLOGY | Facility: CLINIC | Age: 69
End: 2019-12-09

## 2019-12-11 ENCOUNTER — APPOINTMENT (OUTPATIENT)
Dept: CARDIOLOGY | Facility: CLINIC | Age: 69
End: 2019-12-11

## 2019-12-13 ENCOUNTER — APPOINTMENT (OUTPATIENT)
Dept: CARDIOLOGY | Facility: CLINIC | Age: 69
End: 2019-12-13

## 2019-12-16 ENCOUNTER — APPOINTMENT (OUTPATIENT)
Dept: CARDIOLOGY | Facility: CLINIC | Age: 69
End: 2019-12-16

## 2019-12-18 ENCOUNTER — APPOINTMENT (OUTPATIENT)
Dept: CARDIOLOGY | Facility: CLINIC | Age: 69
End: 2019-12-18

## 2019-12-20 ENCOUNTER — APPOINTMENT (OUTPATIENT)
Dept: CARDIOLOGY | Facility: CLINIC | Age: 69
End: 2019-12-20

## 2019-12-23 ENCOUNTER — APPOINTMENT (OUTPATIENT)
Dept: CARDIOLOGY | Facility: CLINIC | Age: 69
End: 2019-12-23

## 2019-12-27 ENCOUNTER — APPOINTMENT (OUTPATIENT)
Dept: CARDIOLOGY | Facility: CLINIC | Age: 69
End: 2019-12-27

## 2019-12-30 ENCOUNTER — APPOINTMENT (OUTPATIENT)
Dept: CARDIOLOGY | Facility: CLINIC | Age: 69
End: 2019-12-30

## 2020-01-21 ENCOUNTER — NON-APPOINTMENT (OUTPATIENT)
Age: 70
End: 2020-01-21

## 2020-01-21 ENCOUNTER — APPOINTMENT (OUTPATIENT)
Dept: CARDIOLOGY | Facility: CLINIC | Age: 70
End: 2020-01-21
Payer: MEDICARE

## 2020-01-21 VITALS
DIASTOLIC BLOOD PRESSURE: 74 MMHG | BODY MASS INDEX: 21.68 KG/M2 | WEIGHT: 127 LBS | OXYGEN SATURATION: 99 % | RESPIRATION RATE: 16 BRPM | HEIGHT: 64 IN | SYSTOLIC BLOOD PRESSURE: 139 MMHG | HEART RATE: 60 BPM

## 2020-01-21 VITALS — SYSTOLIC BLOOD PRESSURE: 124 MMHG | DIASTOLIC BLOOD PRESSURE: 80 MMHG

## 2020-01-21 PROCEDURE — 93000 ELECTROCARDIOGRAM COMPLETE: CPT

## 2020-01-21 PROCEDURE — 99214 OFFICE O/P EST MOD 30 MIN: CPT

## 2020-01-21 NOTE — DISCUSSION/SUMMARY
[FreeTextEntry1] : Patient is a 68yo F with HLD, MVP with severe MR s/p bio MVR, KASIE ligation and MAZE 4/2019, mild CAD (30% LAD stenosis cath 2019) post op AF here for cardiac evaluation. Post op course complicated and now seems to be slowly improving. Had one episode PAF during cardiac rehab in the fall. Currently doing quite well and recovering with improved energy and no SOB. \par \par \par 1. Continue current dose beta blocker, if difficult to control recurrent AF will consider AAT\par 2. Continue ASA/statin/zetia, has mild CAD. Lipids controlled last spring,\par 3. Continue with regular exercise as she is doing\par 4. Now off A/C, also had KASIE ligation so continue ASA only. \par 5. Continue statin/zetia, recheck lipids and routine BW with PMD in May. Possibly dc zetia then\par 6. Follow up 6 months \par

## 2020-01-21 NOTE — PHYSICAL EXAM
[General Appearance - Well Developed] : well developed [General Appearance - Well Nourished] : well nourished [Normal Conjunctiva] : the conjunctiva exhibited no abnormalities [Normal Oropharynx] : normal oropharynx [] : no respiratory distress [Normal Jugular Venous V Waves Present] : normal jugular venous V waves present [Respiration, Rhythm And Depth] : normal respiratory rhythm and effort [Heart Rate And Rhythm] : heart rate and rhythm were normal [Heart Sounds] : normal S1 and S2 [Bowel Sounds] : normal bowel sounds [Abdomen Tenderness] : non-tender [Abdomen Soft] : soft [Nail Clubbing] : no clubbing of the fingernails [Abnormal Walk] : normal gait [Cyanosis, Localized] : no localized cyanosis [Skin Color & Pigmentation] : normal skin color and pigmentation [Skin Turgor] : normal skin turgor [Oriented To Time, Place, And Person] : oriented to person, place, and time [Affect] : the affect was normal [FreeTextEntry1] : no edema

## 2020-01-21 NOTE — ASSESSMENT
[FreeTextEntry1] : ECG: SR, septal Q waves, low voltage, PVCs\par \par LHC (pre-op, 4/2019)\par 1. 30% mid LAD stenosis\par \par ECHO 11/2019:\par 1. Mild LVH, EF 60%\par 2. Mild RVE with normal systolic function\par 3. Severe LAE, normal RA\par 4. Mild aortic regurgitation\par 5. Well seated bio MVR, mean gradient 5mmHg, DVI = 1.4 \par 6. Moderate TR, RVSP 37mmHg

## 2020-01-21 NOTE — HISTORY OF PRESENT ILLNESS
[FreeTextEntry1] : Patient is a 68yo F with HLD, MVP with severe MR s/p bio MVR, KASIE ligation and MAZE 4/2019, post op AF here for cardiac followup. Has been having issues with dizziness and beta blocker increased to treat BP and lower heart rate due to increased gradient across MVR. States when weaned off atenolol had incapacitating episodes of lightheadedness. Got very anxious from symptoms as well. Initially thought related to heat and went to PMD and ED. Subsequently had ECG done and was sinus tach with ectopy. Put on atenolol and then changed to metoprolol, now up to 100mg ER bid and tolerating well. Feeling better but not 100%. Was expecting to do much better post-op than she has done. \par \par Started cardiac rehab , did develop recurrent PAF but no symptoms.  Feeling better now. no CP/SOB. No PND/orthopnea/palps/sycnope/edema. Goe to gym 5 days per week and yoga now. FEels very well now. 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 Lives alone. Did not go to [place], has been helping care for her mother who is 102.  \par \par ROS: GI and  negative

## 2020-01-22 ENCOUNTER — APPOINTMENT (OUTPATIENT)
Dept: OBGYN | Facility: CLINIC | Age: 70
End: 2020-01-22
Payer: MEDICARE

## 2020-01-22 VITALS
BODY MASS INDEX: 21.68 KG/M2 | WEIGHT: 127 LBS | HEIGHT: 64 IN | SYSTOLIC BLOOD PRESSURE: 110 MMHG | DIASTOLIC BLOOD PRESSURE: 70 MMHG

## 2020-01-22 DIAGNOSIS — Z01.419 ENCOUNTER FOR GYNECOLOGICAL EXAMINATION (GENERAL) (ROUTINE) W/OUT ABNORMAL FINDINGS: ICD-10-CM

## 2020-01-22 PROCEDURE — G0101: CPT

## 2020-01-22 PROCEDURE — 99397 PER PM REEVAL EST PAT 65+ YR: CPT | Mod: GY

## 2020-01-22 RX ORDER — RANITIDINE HYDROCHLORIDE 150 MG/1
150 CAPSULE ORAL
Refills: 0 | Status: DISCONTINUED | COMMUNITY
End: 2020-01-22

## 2020-01-22 RX ORDER — FLUTICASONE PROPIONATE 220 UG/1
220 AEROSOL, METERED RESPIRATORY (INHALATION)
Qty: 3 | Refills: 1 | Status: DISCONTINUED | COMMUNITY
Start: 2018-09-25 | End: 2020-01-22

## 2020-01-22 NOTE — PHYSICAL EXAM
[Awake] : awake [Alert] : alert [Soft] : soft [Oriented x3] : oriented to person, place, and time [Normal] : uterus [No Bleeding] : there was no active vaginal bleeding [Uterine Adnexae] : were not tender and not enlarged [Acute Distress] : no acute distress [Mass] : no breast mass [Nipple Discharge] : no nipple discharge [Axillary LAD] : no axillary lymphadenopathy [Tender] : non tender [Mass ___ mm] : [unfilled] ~Umm urethral mass

## 2020-01-22 NOTE — HISTORY OF PRESENT ILLNESS
[Last Mammogram ___] : Last Mammogram was [unfilled] [Last Bone Density ___] : Last bone density studies [unfilled] [Last Pap ___] : Last cervical pap smear was [unfilled] [Male ___] : [unfilled] male [Sexually Active] : is not sexually active

## 2020-01-22 NOTE — REVIEW OF SYSTEMS
[Sight Problems] : sight problems [Recent Wt Gain ___ Lbs] : recent [unfilled] ~Ulb weight gain [Nl] : Integumentary

## 2020-01-25 LAB — HPV HIGH+LOW RISK DNA PNL CVX: NOT DETECTED

## 2020-01-26 LAB — CYTOLOGY CVX/VAG DOC THIN PREP: ABNORMAL

## 2020-01-29 NOTE — DISCHARGE NOTE PROVIDER - NSDCQMCOGNITION_NEU_ALL_CORE
[FreeTextEntry1] : Mrs. Nieto presented to the office today for counseling regarding her decision for surgical treatment of her prolapse and incontinence. All pertinent prior studies including urodynamics were reviewed. She was counseled regarding alternative nonsurgical therapies as well as the prognosis with no intervention.\par \par The patient was advised regarding various surgical options including abdominal, laparoscopic and vaginal approaches. The risks and benefits of surgery were fully reviewed. She was informed of the potential for infection, pain, leaking, disturbance in bladder function, any of which may require additional surgery for revision. She is aware that she will not be able to have vaginal intercourse ever again following this obliterative procedure.\par The patient was advised regarding various surgical options for the stress incontinence including abdominal, laparascopic, transurethral and vaginal approaches, allograft (harvesting one's own tissue), xenograft (using tissue from donor) and synthetic grafts.  The risks and benefits of surgery using graft insertion (mesh) were fully reviewed.  She was informed of the potential for improved durability and the inherent risk of mesh use including but not limited to infection, erosion and chronic inflammation, acute and chronic pain, fistula, disturbance in bladder function, any of which may require additional surgery for mesh revision.  She is aware that the mesh used in her surgery is a permanent mesh.  The patient was advised regarding the July 2011 FDA notification regarding these issues and provided the website address for further reference www.fda.gov <http://www.fda.gov>.  \par \par The general risks of the surgery were reviewed including but not limited to infection, bleeding, surrounding organ or tissue injury, failure meaning continued leaking, voiding dysfunction, needing to go home with a catheter, and the risks of anesthesia.\par \par The approximate length of the surgery hospital stay and postoperative recovery period were reviewed, including a general overview of the convalescence and postoperative followup.\par \par She verbalized a desire to proceed with surgery. Appropriate informed consent was obtained for total vaginal hysterectomy, colpcleisis, sling and cysto. All questions were answered to the patient's satisfaction and we are looking to schedule her.\par \par  No difficulties

## 2020-02-09 ENCOUNTER — TRANSCRIPTION ENCOUNTER (OUTPATIENT)
Age: 70
End: 2020-02-09

## 2020-04-02 NOTE — ED STATDOCS - NS ED MD EM SELECTION
Mahendra Antonio is a 58 year old male here for  No chief complaint on file.    Denies latex allergy or sensitivity.    Medication verified, no changes.  PCP and Pharmacy verified.    Social History     Tobacco Use   Smoking Status Never Smoker   Smokeless Tobacco Never Used     Advance Directives Filed: No    ECO - Fully active, able to carry on all predisease activities without restrictions.    Height: No.  Ht Readings from Last 1 Encounters:   20 5' 8.98\" (1.752 m)     Weight:Yes, shoes off.  Wt Readings from Last 3 Encounters:   20 86.1 kg   20 87.7 kg   20 89.8 kg       BMI: Body mass index is 28.04 kg/m².    REVIEW OF SYSTEMS  GENERAL:  Patient denies headache, fevers, chills, night sweats, weight loss, dizziness, but complains of: excessive fatigue and change in appetite, drinking boost.  ALLERGIC/IMMUNOLOGIC: Verified allergies: Yes  EYES:  Patient denies significant visual difficulties, double vision, blurred vision  ENT/MOUTH: Patient denies problems with hearing, sore throat, sinus drainage, but complains of: mouth sores left side back of tongue and roof of mouth.  ENDOCRINE:  Patient denies diabetes, thyroid disease, hormone replacement, hot flashes  HEMATOLOGIC/LYMPHATIC: Patient denies easy bruising, bleeding, tender lymph nodes, swollen lymph nodes  BREASTS: Patient denies abnormal masses of breast, nipple discharge, pain  RESPIRATORY:  Patient denies lung pain with breathing, cough, coughing up blood, shortness of breath  CARDIOVASCULAR:  Patient denies anginal chest pain, palpitations, shortness of breath when lying flat, peripheral edema  GASTROINTESTINAL: Patient denies abdominal pain , nausea, vomiting, diarrhea, GI bleeding, change in bowel habits, heartburn, sensation of feeling full, difficulty swallowing, but complains of: constipation taking senna.  : Patient denies blood in the urine, burning with urination, frequency, urgency, hesitancy,  incontinence  MUSCULOSKELETAL:  Patient denies joint pain, bone pain, joint swelling, redness, decreased range of motion  SKIN:  Patient denies chronic rashes, inflammation, ulcerations, skin changes, itching, but complains of: dry forehead,\" I had pre cancerous in this spot may need to see dermatologist.\"  NEUROLOGIC:  Patient denies loss of balance, areas of focal weakness, abnormal gait, sensory problems, numbness, tingling  PSYCHIATRIC: Patient denies insomnia, depression, anxiety     [id number] Detailed

## 2020-05-12 ENCOUNTER — APPOINTMENT (OUTPATIENT)
Dept: CARDIOLOGY | Facility: CLINIC | Age: 70
End: 2020-05-12

## 2020-06-22 ENCOUNTER — TRANSCRIPTION ENCOUNTER (OUTPATIENT)
Age: 70
End: 2020-06-22

## 2020-07-21 ENCOUNTER — APPOINTMENT (OUTPATIENT)
Dept: CARDIOLOGY | Facility: CLINIC | Age: 70
End: 2020-07-21
Payer: MEDICARE

## 2020-07-21 ENCOUNTER — NON-APPOINTMENT (OUTPATIENT)
Age: 70
End: 2020-07-21

## 2020-07-21 VITALS
OXYGEN SATURATION: 98 % | SYSTOLIC BLOOD PRESSURE: 112 MMHG | RESPIRATION RATE: 16 BRPM | WEIGHT: 131 LBS | TEMPERATURE: 98 F | HEART RATE: 62 BPM | DIASTOLIC BLOOD PRESSURE: 74 MMHG | BODY MASS INDEX: 22.49 KG/M2

## 2020-07-21 PROCEDURE — 99214 OFFICE O/P EST MOD 30 MIN: CPT

## 2020-07-21 PROCEDURE — 93000 ELECTROCARDIOGRAM COMPLETE: CPT

## 2020-07-21 RX ORDER — METOPROLOL SUCCINATE 100 MG/1
100 TABLET, EXTENDED RELEASE ORAL
Qty: 180 | Refills: 2 | Status: DISCONTINUED | COMMUNITY
Start: 2019-09-11 | End: 2020-07-21

## 2020-07-21 NOTE — DISCUSSION/SUMMARY
[FreeTextEntry1] : Patient is a 69yo F with HLD, MVP with severe MR s/p bio MVR, KASIE ligation and MAZE 4/2019, mild CAD (30% LAD stenosis cath 2019) post op AF here for cardiac evaluation. Post op course complicated and now seems to be slowly improving. Had one episode PAF during cardiac rehab in the fall. Currently doing quite well and recovering with improved energy and no SOB. Recent episode of dizziness, possibly related to metoprolol. Will continue with lower dose\par \par \par 1. Continue metoprol ER 50mg bid\par 2. Continue ASA/statin/zetia, has mild CAD. Check lipids\par 3. Continue with regular exercise as she is doing\par 4. Now off A/C, had KASIE ligation so continue ASA only. \par 5. Follow up 4 months with echo to eval MVR\par \par

## 2020-07-21 NOTE — PHYSICAL EXAM
[General Appearance - Well Developed] : well developed [General Appearance - Well Nourished] : well nourished [Normal Oropharynx] : normal oropharynx [Normal Conjunctiva] : the conjunctiva exhibited no abnormalities [Normal Jugular Venous V Waves Present] : normal jugular venous V waves present [] : no respiratory distress [Respiration, Rhythm And Depth] : normal respiratory rhythm and effort [Heart Sounds] : normal S1 and S2 [Heart Rate And Rhythm] : heart rate and rhythm were normal [Bowel Sounds] : normal bowel sounds [Abnormal Walk] : normal gait [Abdomen Soft] : soft [Abdomen Tenderness] : non-tender [Nail Clubbing] : no clubbing of the fingernails [Cyanosis, Localized] : no localized cyanosis [Oriented To Time, Place, And Person] : oriented to person, place, and time [Skin Turgor] : normal skin turgor [Skin Color & Pigmentation] : normal skin color and pigmentation [Affect] : the affect was normal [FreeTextEntry1] : I/VI systolic murmur

## 2020-07-21 NOTE — HISTORY OF PRESENT ILLNESS
[FreeTextEntry1] : Patient is a 70yo F with HLD, MVP with severe MR s/p bio MVR, KASIE ligation and MAZE 4/2019, post op AF here for cardiac followup. Has been having issues with dizziness and beta blocker increased to treat BP and lower heart rate due to increased gradient across MVR. States when weaned off atenolol had incapacitating episodes of lightheadedness. Got very anxious from symptoms as well. Initially thought related to heat and went to PMD and ED. Subsequently had ECG done and was sinus tach with ectopy. Put on atenolol and then changed to metoprolol, now up to 100mg ER bid and tolerating well.\par \par Started cardiac rehab last fall, did develop recurrent PAF but no symptoms.  Feeling better now,  but felt very dizzy last week. OCcurred in middle of night, no vertigo. States symptoms significant and was very uncomfortable.  States HR was in 40-50. Next morning mostly felt better, took it easy that day. Feeling better now. Has cut back metoprolol to 50mg BID. 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 Remains active with yoga/golf. Lives alone.  has been helping care for her mother who was 89 and recently passed earlier this year.  [place] after (March of this year) \par \par ROS: GI and  negative

## 2020-07-21 NOTE — ASSESSMENT
[FreeTextEntry1] : ECG: SR, septal Q waves\par \par LHC (pre-op, 4/2019)\par 1. 30% mid LAD stenosis\par \par ECHO 11/2019:\par 1. Mild LVH, EF 60%\par 2. Mild RVE with normal systolic function\par 3. Severe LAE, normal RA\par 4. Mild aortic regurgitation\par 5. Well seated bio MVR, mean gradient 5mmHg, DVI = 1.4 \par 6. Moderate TR, RVSP 37mmHg

## 2020-08-06 ENCOUNTER — APPOINTMENT (OUTPATIENT)
Dept: FAMILY MEDICINE | Facility: CLINIC | Age: 70
End: 2020-08-06
Payer: MEDICARE

## 2020-08-06 VITALS
BODY MASS INDEX: 22.2 KG/M2 | HEART RATE: 60 BPM | DIASTOLIC BLOOD PRESSURE: 64 MMHG | WEIGHT: 130 LBS | HEIGHT: 64 IN | SYSTOLIC BLOOD PRESSURE: 118 MMHG

## 2020-08-06 LAB
25(OH)D3 SERPL-MCNC: 68.3 NG/ML
ALBUMIN SERPL ELPH-MCNC: 4.5 G/DL
ALBUMIN SERPL ELPH-MCNC: 4.7 G/DL
ALP BLD-CCNC: 93 U/L
ALP BLD-CCNC: 95 U/L
ALT SERPL-CCNC: 29 U/L
ALT SERPL-CCNC: 30 U/L
ANION GAP SERPL CALC-SCNC: 10 MMOL/L
ANION GAP SERPL CALC-SCNC: 11 MMOL/L
APPEARANCE: CLEAR
AST SERPL-CCNC: 30 U/L
AST SERPL-CCNC: 31 U/L
BASOPHILS # BLD AUTO: 0.05 K/UL
BASOPHILS NFR BLD AUTO: 0.9 %
BILIRUB SERPL-MCNC: 0.6 MG/DL
BILIRUB SERPL-MCNC: 0.6 MG/DL
BILIRUBIN URINE: NEGATIVE
BLOOD URINE: NEGATIVE
BUN SERPL-MCNC: 15 MG/DL
BUN SERPL-MCNC: 16 MG/DL
CALCIUM SERPL-MCNC: 10.3 MG/DL
CALCIUM SERPL-MCNC: 10.3 MG/DL
CHLORIDE SERPL-SCNC: 104 MMOL/L
CHLORIDE SERPL-SCNC: 104 MMOL/L
CHOLEST SERPL-MCNC: 165 MG/DL
CHOLEST SERPL-MCNC: 166 MG/DL
CHOLEST/HDLC SERPL: 2.3 RATIO
CHOLEST/HDLC SERPL: 2.4 RATIO
CO2 SERPL-SCNC: 25 MMOL/L
CO2 SERPL-SCNC: 26 MMOL/L
COLOR: NORMAL
CREAT SERPL-MCNC: 0.62 MG/DL
CREAT SERPL-MCNC: 0.66 MG/DL
EOSINOPHIL # BLD AUTO: 0.26 K/UL
EOSINOPHIL NFR BLD AUTO: 4.7 %
ESTIMATED AVERAGE GLUCOSE: 114 MG/DL
GLUCOSE QUALITATIVE U: NEGATIVE
GLUCOSE SERPL-MCNC: 91 MG/DL
GLUCOSE SERPL-MCNC: 92 MG/DL
HBA1C MFR BLD HPLC: 5.6 %
HCT VFR BLD CALC: 44.6 %
HDLC SERPL-MCNC: 69 MG/DL
HDLC SERPL-MCNC: 72 MG/DL
HGB BLD-MCNC: 14.3 G/DL
IMM GRANULOCYTES NFR BLD AUTO: 0.4 %
KETONES URINE: NEGATIVE
LDLC SERPL CALC-MCNC: 79 MG/DL
LDLC SERPL CALC-MCNC: 81 MG/DL
LEUKOCYTE ESTERASE URINE: NEGATIVE
LYMPHOCYTES # BLD AUTO: 1.91 K/UL
LYMPHOCYTES NFR BLD AUTO: 34.2 %
MAGNESIUM SERPL-MCNC: 2.1 MG/DL
MAN DIFF?: NORMAL
MCHC RBC-ENTMCNC: 29.1 PG
MCHC RBC-ENTMCNC: 32.1 GM/DL
MCV RBC AUTO: 90.7 FL
MONOCYTES # BLD AUTO: 0.57 K/UL
MONOCYTES NFR BLD AUTO: 10.2 %
NEUTROPHILS # BLD AUTO: 2.77 K/UL
NEUTROPHILS NFR BLD AUTO: 49.6 %
NITRITE URINE: NEGATIVE
PH URINE: 6.5
PLATELET # BLD AUTO: 237 K/UL
POTASSIUM SERPL-SCNC: 4.3 MMOL/L
POTASSIUM SERPL-SCNC: 4.4 MMOL/L
PROT SERPL-MCNC: 7.8 G/DL
PROT SERPL-MCNC: 7.8 G/DL
PROTEIN URINE: NEGATIVE
RBC # BLD: 4.92 M/UL
RBC # FLD: 13 %
SODIUM SERPL-SCNC: 140 MMOL/L
SODIUM SERPL-SCNC: 141 MMOL/L
SPECIFIC GRAVITY URINE: 1.02
T4 FREE SERPL-MCNC: 1.3 NG/DL
TRIGL SERPL-MCNC: 77 MG/DL
TRIGL SERPL-MCNC: 77 MG/DL
TSH SERPL-ACNC: 1.74 UIU/ML
TSH SERPL-ACNC: 1.74 UIU/ML
UROBILINOGEN URINE: NORMAL
WBC # FLD AUTO: 5.58 K/UL

## 2020-08-06 PROCEDURE — G0438: CPT

## 2020-08-06 PROCEDURE — 36415 COLL VENOUS BLD VENIPUNCTURE: CPT

## 2020-08-06 NOTE — HEALTH RISK ASSESSMENT
[Very Good] : ~his/her~ current health as very good [Good] : ~his/her~  mood as  good [No] : In the past 12 months have you used drugs other than those required for medical reasons? No [No falls in past year] : Patient reported no falls in the past year [HIV test declined] : HIV test declined [Hepatitis C test declined] : Hepatitis C test declined [None] : None [Retired] : retired [Alone] : lives alone [College] : College [Single] : single [# Of Children ___] : has [unfilled] children [Feels Safe at Home] : Feels safe at home [Fully functional (bathing, dressing, toileting, transferring, walking, feeding)] : Fully functional (bathing, dressing, toileting, transferring, walking, feeding) [Fully functional (using the telephone, shopping, preparing meals, housekeeping, doing laundry, using] : Fully functional and needs no help or supervision to perform IADLs (using the telephone, shopping, preparing meals, housekeeping, doing laundry, using transportation, managing medications and managing finances) [Reports normal functional visual acuity (ie: able to read med bottle)] : Reports normal functional visual acuity [Smoke Detector] : smoke detector [Carbon Monoxide Detector] : carbon monoxide detector [Safety elements used in home] : safety elements used in home [Seat Belt] :  uses seat belt [Sunscreen] : uses sunscreen [With Patient/Caregiver] : With Patient/Caregiver [Designated Healthcare Proxy] : Designated healthcare proxy [Name: ___] : Health Care Proxy's Name: [unfilled]  [Relationship: ___] : Relationship: [unfilled] [] : No [Change in mental status noted] : No change in mental status noted [Handling Complex Tasks] : denies difficulty handling complex tasks [Language] : denies difficulty with language [Learning/Retaining New Information] : denies difficulty learning/retaining new information [Reports changes in dental health] : Reports no changes in dental health [Reports changes in hearing] : Reports no changes in hearing [Reports changes in vision] : Reports no changes in vision [PapSmearDate] : 01/20 [MammogramDate] : 09/19 [PapSmearComments] : Becky Montano NP [BoneDensityComments] : has prescription from gyn to go for next [ColonoscopyDate] : 04/15 [BoneDensityDate] : 12/17 [de-identified] : JUST SAW DENTIST [de-identified] : GLASSES FOR DISTANCE AND READING [ColonoscopyComments] : has appointment in september for next colonoscopy [FreeTextEntry4] : secondary health care proxy: brother; Diego Champagne [AdvancecareDate] : 08/20

## 2020-08-06 NOTE — PLAN
[FreeTextEntry1] : VISION SCREENING\par HEALTHY DIET AND LIFESTYLE MODIFICATIONS\par CHECK ROUTINE LAB WORK\par EKG DONE ROUTINELY WITH CARDIOLOGY\par HAS PRESCRIPTION FOR BONE DENSITY AND WILL BE GOING FOR NEXT COLONOSCOPY\par FLU VACCINE IN THE FALL\par CALL WITH ANY QUESTIONS, CONCERNS OR CHANGES

## 2020-08-06 NOTE — HISTORY OF PRESENT ILLNESS
[FreeTextEntry1] : CPE [de-identified] : MS. BURKS IS A PLEASANT 70 YO PRESENTING FOR YEARLY CPE. SHE HAS A CHRONIC HISTORY OF ASTHMA, HYPERCHOLESTEROLEMIA AND OSTEOPENIA. STATUS POST MVR PROCEDURE. ON SIMVASTATIN AND ZETIA FOR CHOLESTEROL.  FOLLOWS ROUTINELY WITH CARDIOLOGY.\par \par PULMONOLOGY: DR. RESTREPO\par GYN: [facility]\par CARDIOLOGY: DR. PANIAGUA\par OPHTHALMOLOGY: DR. LUCERO\par DERMATOLOGY: DR. BAILEY - YEARLY SCREENING\par GI: DR. MONROE

## 2020-08-06 NOTE — PHYSICAL EXAM
[No Acute Distress] : no acute distress [Well-Appearing] : well-appearing [Well Nourished] : well nourished [PERRL] : pupils equal round and reactive to light [Normal Sclera/Conjunctiva] : normal sclera/conjunctiva [Normal Outer Ear/Nose] : the outer ears and nose were normal in appearance [Normal Oropharynx] : the oropharynx was normal [No Lymphadenopathy] : no lymphadenopathy [Normal TMs] : both tympanic membranes were normal [No Respiratory Distress] : no respiratory distress  [Supple] : supple [Clear to Auscultation] : lungs were clear to auscultation bilaterally [No Accessory Muscle Use] : no accessory muscle use [Normal Rate] : normal rate  [Regular Rhythm] : with a regular rhythm [Normal S1, S2] : normal S1 and S2 [Soft] : abdomen soft [Non Tender] : non-tender [Normal Bowel Sounds] : normal bowel sounds [Grossly Normal Strength/Tone] : grossly normal strength/tone [No Joint Swelling] : no joint swelling [No Rash] : no rash [Coordination Grossly Intact] : coordination grossly intact [No Focal Deficits] : no focal deficits [Normal Gait] : normal gait [Deep Tendon Reflexes (DTR)] : deep tendon reflexes were 2+ and symmetric [Speech Grossly Normal] : speech grossly normal [Normal Affect] : the affect was normal [Normal Mood] : the mood was normal

## 2020-08-07 ENCOUNTER — APPOINTMENT (OUTPATIENT)
Dept: RADIOLOGY | Facility: CLINIC | Age: 70
End: 2020-08-07
Payer: MEDICARE

## 2020-08-07 ENCOUNTER — TRANSCRIPTION ENCOUNTER (OUTPATIENT)
Age: 70
End: 2020-08-07

## 2020-08-07 ENCOUNTER — OUTPATIENT (OUTPATIENT)
Dept: OUTPATIENT SERVICES | Facility: HOSPITAL | Age: 70
LOS: 1 days | End: 2020-08-07
Payer: MEDICARE

## 2020-08-07 ENCOUNTER — RESULT REVIEW (OUTPATIENT)
Age: 70
End: 2020-08-07

## 2020-08-07 DIAGNOSIS — Z01.419 ENCOUNTER FOR GYNECOLOGICAL EXAMINATION (GENERAL) (ROUTINE) WITHOUT ABNORMAL FINDINGS: ICD-10-CM

## 2020-08-07 DIAGNOSIS — Z98.890 OTHER SPECIFIED POSTPROCEDURAL STATES: Chronic | ICD-10-CM

## 2020-08-07 DIAGNOSIS — D24.2 BENIGN NEOPLASM OF LEFT BREAST: Chronic | ICD-10-CM

## 2020-08-07 DIAGNOSIS — H26.9 UNSPECIFIED CATARACT: Chronic | ICD-10-CM

## 2020-08-07 LAB
BASOPHILS # BLD AUTO: 0.05 K/UL
BASOPHILS NFR BLD AUTO: 1.2 %
EOSINOPHIL # BLD AUTO: 0.23 K/UL
EOSINOPHIL NFR BLD AUTO: 5.3 %
HCT VFR BLD CALC: 45.1 %
HGB BLD-MCNC: 14.1 G/DL
IMM GRANULOCYTES NFR BLD AUTO: 0.2 %
LYMPHOCYTES # BLD AUTO: 1.38 K/UL
LYMPHOCYTES NFR BLD AUTO: 31.9 %
MAN DIFF?: NORMAL
MCHC RBC-ENTMCNC: 28.4 PG
MCHC RBC-ENTMCNC: 31.3 GM/DL
MCV RBC AUTO: 90.7 FL
MONOCYTES # BLD AUTO: 0.44 K/UL
MONOCYTES NFR BLD AUTO: 10.2 %
NEUTROPHILS # BLD AUTO: 2.21 K/UL
NEUTROPHILS NFR BLD AUTO: 51.2 %
PLATELET # BLD AUTO: 243 K/UL
RBC # BLD: 4.97 M/UL
RBC # FLD: 13.1 %
WBC # FLD AUTO: 4.32 K/UL

## 2020-08-07 PROCEDURE — 77080 DXA BONE DENSITY AXIAL: CPT | Mod: 26

## 2020-08-07 PROCEDURE — 77080 DXA BONE DENSITY AXIAL: CPT

## 2020-08-11 ENCOUNTER — TRANSCRIPTION ENCOUNTER (OUTPATIENT)
Age: 70
End: 2020-08-11

## 2020-08-11 RX ORDER — EZETIMIBE 10 MG/1
10 TABLET ORAL
Qty: 90 | Refills: 0 | Status: DISCONTINUED | COMMUNITY
Start: 2019-08-23 | End: 2020-08-11

## 2020-08-19 LAB — HEMOCCULT STL QL IA: NEGATIVE

## 2020-09-02 RX ORDER — AMOXICILLIN AND CLAVULANATE POTASSIUM 500; 125 MG/1; MG/1
500-125 TABLET, FILM COATED ORAL
Qty: 20 | Refills: 0 | Status: COMPLETED | COMMUNITY
Start: 2020-09-02 | End: 2020-09-02

## 2020-09-08 ENCOUNTER — APPOINTMENT (OUTPATIENT)
Dept: RADIOLOGY | Facility: CLINIC | Age: 70
End: 2020-09-08
Payer: MEDICARE

## 2020-09-08 ENCOUNTER — OUTPATIENT (OUTPATIENT)
Dept: OUTPATIENT SERVICES | Facility: HOSPITAL | Age: 70
LOS: 1 days | End: 2020-09-08
Payer: MEDICARE

## 2020-09-08 ENCOUNTER — RESULT REVIEW (OUTPATIENT)
Age: 70
End: 2020-09-08

## 2020-09-08 DIAGNOSIS — Z98.890 OTHER SPECIFIED POSTPROCEDURAL STATES: Chronic | ICD-10-CM

## 2020-09-08 DIAGNOSIS — Z00.00 ENCOUNTER FOR GENERAL ADULT MEDICAL EXAMINATION WITHOUT ABNORMAL FINDINGS: ICD-10-CM

## 2020-09-08 DIAGNOSIS — D24.2 BENIGN NEOPLASM OF LEFT BREAST: Chronic | ICD-10-CM

## 2020-09-08 DIAGNOSIS — H26.9 UNSPECIFIED CATARACT: Chronic | ICD-10-CM

## 2020-09-08 PROCEDURE — 71046 X-RAY EXAM CHEST 2 VIEWS: CPT | Mod: 26

## 2020-09-08 PROCEDURE — 71046 X-RAY EXAM CHEST 2 VIEWS: CPT

## 2020-09-25 ENCOUNTER — APPOINTMENT (OUTPATIENT)
Dept: SURGERY | Facility: CLINIC | Age: 70
End: 2020-09-25
Payer: MEDICARE

## 2020-09-25 VITALS
BODY MASS INDEX: 22.53 KG/M2 | DIASTOLIC BLOOD PRESSURE: 71 MMHG | HEART RATE: 71 BPM | TEMPERATURE: 97.4 F | HEIGHT: 64 IN | OXYGEN SATURATION: 98 % | RESPIRATION RATE: 16 BRPM | SYSTOLIC BLOOD PRESSURE: 112 MMHG | WEIGHT: 132 LBS

## 2020-09-25 PROCEDURE — 99204 OFFICE O/P NEW MOD 45 MIN: CPT

## 2020-09-25 RX ORDER — AMOXICILLIN 500 MG/1
500 CAPSULE ORAL
Qty: 12 | Refills: 1 | Status: DISCONTINUED | COMMUNITY
Start: 2020-01-21 | End: 2020-09-25

## 2020-09-25 RX ORDER — AMOXICILLIN AND CLAVULANATE POTASSIUM 875; 125 MG/1; MG/1
875-125 TABLET, COATED ORAL
Qty: 40 | Refills: 0 | Status: DISCONTINUED | COMMUNITY
Start: 2020-09-02 | End: 2020-09-25

## 2020-09-25 NOTE — HISTORY OF PRESENT ILLNESS
[de-identified] : The patient comes to the office in consultation by Dr. Tina Gutierrez for evaluation of a mass on the left knee.  The patient reports that she has noted a lump develop over the left patella about 6 months ago and appears to have become larger.  She states that the lump has caused mild discomfort while kneeling or trying to do yoga.  She denies any drainage, redness, or trauma to the area.

## 2020-09-25 NOTE — CONSULT LETTER
[Dear  ___] : Dear  [unfilled], [Consult Letter:] : I had the pleasure of evaluating your patient, [unfilled]. [Please see my note below.] : Please see my note below. [Consult Closing:] : Thank you very much for allowing me to participate in the care of this patient.  If you have any questions, please do not hesitate to contact me. [Sincerely,] : Sincerely, [FreeTextEntry3] : Juan Jose Sánchez MD, FACS\par  of Surgery\par [facility]\par

## 2020-09-25 NOTE — PHYSICAL EXAM
[JVD] : no jugular venous distention  [No Rash or Lesion] : No rash or lesion [Purpura] : no purpura  [Petechiae] : no petechiae [Skin Ulcer] : no ulcer [Skin Induration] : no induration [Alert] : alert [Oriented to Person] : oriented to person [Oriented to Place] : oriented to place [Oriented to Time] : oriented to time [Calm] : calm [de-identified] : non toxic, in no acute distress  [de-identified] : NC/AT PERRL EOMI no scleral icterus  [de-identified] : trachea midline, no gross mass  [de-identified] : no audible wheezing or stridor  [de-identified] : non distended  [de-identified] : FROM of all extremities with no gross deformity or angulation  [de-identified] : there is a 1 by .5 cm mobile rubbery mass overlying the left patella that appeats to be consistent with a likely lipoma versus cyst [de-identified] : mood is calm

## 2020-09-25 NOTE — ASSESSMENT
[FreeTextEntry1] : The patient is a 70 year old female with a left knee mass.  The patient wishes to have the mass removed.  The risks, benefits, and alternatives including the option of doing nothing to excision of a left knee mass were discussed.  The potential complications including but not limited to infection, bleeding, wound dehiscence, and possible recurrence of the mass were discussed.  The patient understands and wishes to proceed at the next available time.\par \par \par \par

## 2020-09-29 ENCOUNTER — APPOINTMENT (OUTPATIENT)
Dept: MAMMOGRAPHY | Facility: CLINIC | Age: 70
End: 2020-09-29
Payer: MEDICARE

## 2020-09-29 ENCOUNTER — RESULT REVIEW (OUTPATIENT)
Age: 70
End: 2020-09-29

## 2020-09-29 ENCOUNTER — APPOINTMENT (OUTPATIENT)
Dept: RADIOLOGY | Facility: CLINIC | Age: 70
End: 2020-09-29
Payer: MEDICARE

## 2020-09-29 ENCOUNTER — APPOINTMENT (OUTPATIENT)
Dept: ULTRASOUND IMAGING | Facility: CLINIC | Age: 70
End: 2020-09-29
Payer: MEDICARE

## 2020-09-29 ENCOUNTER — APPOINTMENT (OUTPATIENT)
Dept: RHEUMATOLOGY | Facility: CLINIC | Age: 70
End: 2020-09-29
Payer: MEDICARE

## 2020-09-29 ENCOUNTER — OUTPATIENT (OUTPATIENT)
Dept: OUTPATIENT SERVICES | Facility: HOSPITAL | Age: 70
LOS: 1 days | End: 2020-09-29
Payer: MEDICARE

## 2020-09-29 VITALS
TEMPERATURE: 97.7 F | HEIGHT: 64 IN | SYSTOLIC BLOOD PRESSURE: 122 MMHG | BODY MASS INDEX: 22.53 KG/M2 | DIASTOLIC BLOOD PRESSURE: 80 MMHG | OXYGEN SATURATION: 95 % | WEIGHT: 132 LBS | HEART RATE: 85 BPM | RESPIRATION RATE: 17 BRPM

## 2020-09-29 DIAGNOSIS — Z86.39 PERSONAL HISTORY OF OTHER ENDOCRINE, NUTRITIONAL AND METABOLIC DISEASE: ICD-10-CM

## 2020-09-29 DIAGNOSIS — Z00.00 ENCOUNTER FOR GENERAL ADULT MEDICAL EXAMINATION WITHOUT ABNORMAL FINDINGS: ICD-10-CM

## 2020-09-29 DIAGNOSIS — Z13.820 ENCOUNTER FOR SCREENING FOR OSTEOPOROSIS: ICD-10-CM

## 2020-09-29 DIAGNOSIS — M40.209 UNSPECIFIED KYPHOSIS, SITE UNSPECIFIED: ICD-10-CM

## 2020-09-29 DIAGNOSIS — Z98.890 OTHER SPECIFIED POSTPROCEDURAL STATES: Chronic | ICD-10-CM

## 2020-09-29 DIAGNOSIS — R92.8 OTHER ABNORMAL AND INCONCLUSIVE FINDINGS ON DIAGNOSTIC IMAGING OF BREAST: ICD-10-CM

## 2020-09-29 DIAGNOSIS — H26.9 UNSPECIFIED CATARACT: Chronic | ICD-10-CM

## 2020-09-29 DIAGNOSIS — Z83.49 FAMILY HISTORY OF OTHER ENDOCRINE, NUTRITIONAL AND METABOLIC DISEASES: ICD-10-CM

## 2020-09-29 DIAGNOSIS — D24.2 BENIGN NEOPLASM OF LEFT BREAST: Chronic | ICD-10-CM

## 2020-09-29 DIAGNOSIS — Z82.49 FAMILY HISTORY OF ISCHEMIC HEART DISEASE AND OTHER DISEASES OF THE CIRCULATORY SYSTEM: ICD-10-CM

## 2020-09-29 PROCEDURE — 99203 OFFICE O/P NEW LOW 30 MIN: CPT

## 2020-09-29 PROCEDURE — 77067 SCR MAMMO BI INCL CAD: CPT | Mod: 26

## 2020-09-29 PROCEDURE — 76641 ULTRASOUND BREAST COMPLETE: CPT | Mod: 26,50

## 2020-09-29 PROCEDURE — 77063 BREAST TOMOSYNTHESIS BI: CPT | Mod: 26

## 2020-09-29 PROCEDURE — 77067 SCR MAMMO BI INCL CAD: CPT

## 2020-09-29 PROCEDURE — 76641 ULTRASOUND BREAST COMPLETE: CPT

## 2020-09-29 PROCEDURE — 77063 BREAST TOMOSYNTHESIS BI: CPT

## 2020-09-29 NOTE — HISTORY OF PRESENT ILLNESS
[FreeTextEntry1] : 70 year old female with PMH as listed below presents today for an initial evaluation for kyphosis. \par \par Patient notes that over that last few years she has excessive curvature of the thoracic spine. \par Denies thoracic pain, decreased pulmonary function, limited physical function.\par \par Last BMD 08/2020: Osteopenia. AP spine L-L4: -0.6. Femoral neck: -1.9. LTH: -1.5. Frax major fx: 16%, hip fx: 4.2. \par \par Previously treated with Fosamax and calcium supplements > 3 years ago. Currently taking vitamin d supplements prn. Has hx of fibular fx after fall. Father with hip fracture. She was on short period of hormone replacement therapy for 1 year in 2000. denies steroid use. denies hx of inflammatory arthritides. denies hx of recent falls. denies any upcoming or planned jaw surgery ,extractions, etc. denies cigarette/alcohol. menopause age: 50\par

## 2020-09-29 NOTE — PHYSICAL EXAM
[General Appearance - Alert] : alert [General Appearance - In No Acute Distress] : in no acute distress [General Appearance - Well Nourished] : well nourished [General Appearance - Well Developed] : well developed [Sclera] : the sclera and conjunctiva were normal [PERRL With Normal Accommodation] : pupils were equal in size, round, and reactive to light [Extraocular Movements] : extraocular movements were intact [Outer Ear] : the ears and nose were normal in appearance [Examination Of The Oral Cavity] : the lips and gums were normal [Oropharynx] : the oropharynx was normal [Neck Appearance] : the appearance of the neck was normal [Jugular Venous Distention Increased] : there was no jugular-venous distention [Auscultation Breath Sounds / Voice Sounds] : lungs were clear to auscultation bilaterally [Heart Rate And Rhythm] : heart rate was normal and rhythm regular [Heart Sounds] : normal S1 and S2 [Heart Sounds Gallop] : no gallops [Murmurs] : no murmurs [Heart Sounds Pericardial Friction Rub] : no pericardial rub [Bowel Sounds] : normal bowel sounds [Abdomen Soft] : soft [Abdomen Tenderness] : non-tender [Abnormal Walk] : normal gait [Nail Clubbing] : no clubbing  or cyanosis of the fingernails [Involuntary Movements] : no involuntary movements were seen [Musculoskeletal - Swelling] : no joint swelling seen [Motor Tone] : muscle strength and tone were normal [Skin Color & Pigmentation] : normal skin color and pigmentation [Skin Turgor] : normal skin turgor [] : no rash [Skin Lesions] : no skin lesions [Sensation] : the sensory exam was normal to light touch and pinprick [Motor Exam] : the motor exam was normal [No Focal Deficits] : no focal deficits [Oriented To Time, Place, And Person] : oriented to person, place, and time [Impaired Insight] : insight and judgment were intact [Affect] : the affect was normal [Mood] : the mood was normal [No CVA Tenderness] : no ~M costovertebral angle tenderness [No Spinal Tenderness] : no spinal tenderness

## 2020-09-29 NOTE — ASSESSMENT
[FreeTextEntry1] : Osteopenia with elevated FRAX for hip fx. \par - previously treated with Fosamax > 3 years ago\par - c/w vitamin d supplementation. \par - regular exercise: aerobic and resistance\par - fall prevention\par - consider restarting therapy with bisphosphate vs prolia (pt would like to hold off for now)\par \par Kyphosis\par - Asymptomatic. \par - Denies thoracic pain, decreased pulmonary function, limited physical function.\par - consider PT for postural alignment, strengthening of back extensor muscles\par \par Discussed treatment plan with the patient. The patient was given the opportunity to ask questions and all questions were answered to their satisfaction.

## 2020-09-30 ENCOUNTER — TRANSCRIPTION ENCOUNTER (OUTPATIENT)
Age: 70
End: 2020-09-30

## 2020-10-06 ENCOUNTER — OUTPATIENT (OUTPATIENT)
Dept: OUTPATIENT SERVICES | Facility: HOSPITAL | Age: 70
LOS: 1 days | End: 2020-10-06
Payer: MEDICARE

## 2020-10-06 DIAGNOSIS — Z98.890 OTHER SPECIFIED POSTPROCEDURAL STATES: Chronic | ICD-10-CM

## 2020-10-06 DIAGNOSIS — Z01.818 ENCOUNTER FOR OTHER PREPROCEDURAL EXAMINATION: ICD-10-CM

## 2020-10-06 DIAGNOSIS — H26.9 UNSPECIFIED CATARACT: Chronic | ICD-10-CM

## 2020-10-06 DIAGNOSIS — D24.2 BENIGN NEOPLASM OF LEFT BREAST: Chronic | ICD-10-CM

## 2020-10-06 LAB
ANION GAP SERPL CALC-SCNC: 10 MMOL/L — SIGNIFICANT CHANGE UP (ref 5–17)
APTT BLD: 30.3 SEC — SIGNIFICANT CHANGE UP (ref 27.5–35.5)
BASOPHILS # BLD AUTO: 0.04 K/UL — SIGNIFICANT CHANGE UP (ref 0–0.2)
BASOPHILS NFR BLD AUTO: 0.8 % — SIGNIFICANT CHANGE UP (ref 0–2)
BUN SERPL-MCNC: 21 MG/DL — HIGH (ref 8–20)
CALCIUM SERPL-MCNC: 9.9 MG/DL — SIGNIFICANT CHANGE UP (ref 8.6–10.2)
CHLORIDE SERPL-SCNC: 105 MMOL/L — SIGNIFICANT CHANGE UP (ref 98–107)
CO2 SERPL-SCNC: 25 MMOL/L — SIGNIFICANT CHANGE UP (ref 22–29)
CREAT SERPL-MCNC: 0.52 MG/DL — SIGNIFICANT CHANGE UP (ref 0.5–1.3)
EOSINOPHIL # BLD AUTO: 0.17 K/UL — SIGNIFICANT CHANGE UP (ref 0–0.5)
EOSINOPHIL NFR BLD AUTO: 3.3 % — SIGNIFICANT CHANGE UP (ref 0–6)
GLUCOSE SERPL-MCNC: 95 MG/DL — SIGNIFICANT CHANGE UP (ref 70–99)
HCT VFR BLD CALC: 43.7 % — SIGNIFICANT CHANGE UP (ref 34.5–45)
HGB BLD-MCNC: 13.9 G/DL — SIGNIFICANT CHANGE UP (ref 11.5–15.5)
IMM GRANULOCYTES NFR BLD AUTO: 0.2 % — SIGNIFICANT CHANGE UP (ref 0–1.5)
INR BLD: 0.95 RATIO — SIGNIFICANT CHANGE UP (ref 0.88–1.16)
LYMPHOCYTES # BLD AUTO: 2.02 K/UL — SIGNIFICANT CHANGE UP (ref 1–3.3)
LYMPHOCYTES # BLD AUTO: 39.3 % — SIGNIFICANT CHANGE UP (ref 13–44)
MCHC RBC-ENTMCNC: 28.8 PG — SIGNIFICANT CHANGE UP (ref 27–34)
MCHC RBC-ENTMCNC: 31.8 GM/DL — LOW (ref 32–36)
MCV RBC AUTO: 90.5 FL — SIGNIFICANT CHANGE UP (ref 80–100)
MONOCYTES # BLD AUTO: 0.69 K/UL — SIGNIFICANT CHANGE UP (ref 0–0.9)
MONOCYTES NFR BLD AUTO: 13.4 % — SIGNIFICANT CHANGE UP (ref 2–14)
NEUTROPHILS # BLD AUTO: 2.21 K/UL — SIGNIFICANT CHANGE UP (ref 1.8–7.4)
NEUTROPHILS NFR BLD AUTO: 43 % — SIGNIFICANT CHANGE UP (ref 43–77)
PLATELET # BLD AUTO: 234 K/UL — SIGNIFICANT CHANGE UP (ref 150–400)
POTASSIUM SERPL-MCNC: 4.3 MMOL/L — SIGNIFICANT CHANGE UP (ref 3.5–5.3)
POTASSIUM SERPL-SCNC: 4.3 MMOL/L — SIGNIFICANT CHANGE UP (ref 3.5–5.3)
PROTHROM AB SERPL-ACNC: 11 SEC — SIGNIFICANT CHANGE UP (ref 10.6–13.6)
RBC # BLD: 4.83 M/UL — SIGNIFICANT CHANGE UP (ref 3.8–5.2)
RBC # FLD: 13 % — SIGNIFICANT CHANGE UP (ref 10.3–14.5)
SODIUM SERPL-SCNC: 140 MMOL/L — SIGNIFICANT CHANGE UP (ref 135–145)
WBC # BLD: 5.14 K/UL — SIGNIFICANT CHANGE UP (ref 3.8–10.5)
WBC # FLD AUTO: 5.14 K/UL — SIGNIFICANT CHANGE UP (ref 3.8–10.5)

## 2020-10-06 PROCEDURE — 36415 COLL VENOUS BLD VENIPUNCTURE: CPT

## 2020-10-06 PROCEDURE — 80048 BASIC METABOLIC PNL TOTAL CA: CPT

## 2020-10-06 PROCEDURE — 85610 PROTHROMBIN TIME: CPT

## 2020-10-06 PROCEDURE — G0463: CPT

## 2020-10-06 PROCEDURE — 85730 THROMBOPLASTIN TIME PARTIAL: CPT

## 2020-10-06 PROCEDURE — 93005 ELECTROCARDIOGRAM TRACING: CPT

## 2020-10-06 PROCEDURE — 93010 ELECTROCARDIOGRAM REPORT: CPT

## 2020-10-06 PROCEDURE — 85025 COMPLETE CBC W/AUTO DIFF WBC: CPT

## 2020-10-07 ENCOUNTER — APPOINTMENT (OUTPATIENT)
Dept: FAMILY MEDICINE | Facility: CLINIC | Age: 70
End: 2020-10-07
Payer: MEDICARE

## 2020-10-07 ENCOUNTER — NON-APPOINTMENT (OUTPATIENT)
Age: 70
End: 2020-10-07

## 2020-10-07 ENCOUNTER — APPOINTMENT (OUTPATIENT)
Dept: CARDIOLOGY | Facility: CLINIC | Age: 70
End: 2020-10-07
Payer: MEDICARE

## 2020-10-07 VITALS
HEIGHT: 64 IN | SYSTOLIC BLOOD PRESSURE: 120 MMHG | DIASTOLIC BLOOD PRESSURE: 70 MMHG | BODY MASS INDEX: 22.2 KG/M2 | HEART RATE: 72 BPM | WEIGHT: 130 LBS | RESPIRATION RATE: 17 BRPM | TEMPERATURE: 97.6 F

## 2020-10-07 VITALS
HEIGHT: 64 IN | SYSTOLIC BLOOD PRESSURE: 118 MMHG | DIASTOLIC BLOOD PRESSURE: 60 MMHG | HEART RATE: 78 BPM | BODY MASS INDEX: 22.53 KG/M2 | WEIGHT: 132 LBS

## 2020-10-07 DIAGNOSIS — Z23 ENCOUNTER FOR IMMUNIZATION: ICD-10-CM

## 2020-10-07 DIAGNOSIS — Z01.818 ENCOUNTER FOR OTHER PREPROCEDURAL EXAMINATION: ICD-10-CM

## 2020-10-07 PROCEDURE — 99214 OFFICE O/P EST MOD 30 MIN: CPT

## 2020-10-07 PROCEDURE — G0008: CPT

## 2020-10-07 PROCEDURE — 90662 IIV NO PRSV INCREASED AG IM: CPT

## 2020-10-07 PROCEDURE — 93000 ELECTROCARDIOGRAM COMPLETE: CPT

## 2020-10-07 PROCEDURE — 99215 OFFICE O/P EST HI 40 MIN: CPT | Mod: 25

## 2020-10-07 RX ORDER — BACILLUS COAGULANS 1B CELL
CAPSULE ORAL
Qty: 30 | Refills: 0 | Status: DISCONTINUED | COMMUNITY
Start: 2020-09-04 | End: 2020-10-07

## 2020-10-07 RX ORDER — METRONIDAZOLE 500 MG/1
500 TABLET ORAL EVERY 6 HOURS
Qty: 118 | Refills: 0 | Status: DISCONTINUED | COMMUNITY
Start: 2020-09-02 | End: 2020-10-07

## 2020-10-07 NOTE — DISCUSSION/SUMMARY
[FreeTextEntry1] : Case and plan discussed with Dr. Pierce.\par \par 1 - Mitral valve prolapse with severe MR status-post bio MVR, KASIE ligation and MAZE 4/2019:  patient is stable at this time.  Denies complaint of shortness of breath.  Most recent echocardiogram reveals an EF of 60%.  \par \par 2 - Coronary artery disease:  patient without complaints of chest pain, shortness of breath or palpitations. Cardiac catheterization April 2019 shows LAD with 30% stenosis.\par \par 3 - Paroxysmal atrial fibrillation:  today's EKG reveals sinus rhythm with PACs.  Denies shortness of breath or palpitaitons.  No need for anticoagulation as patient is status-post KASIE ligation and MAZE 4/2019.\par \par 4 - At this time there are no absolute cardiac contraindications for Mrs. Champagne to proceed with her scheduled procedure.  She has been advised to hold her aspirin for 7 days prior to the procedure, which she has already started.  She is to hold her multivitamin as well.  She may take all of her other medications up to and including the morning of the procedure with a small sip of water.  Patient is to receive prophylactic Cefazolin 2gm IV within 60 minutes of surgical incision.\par \par 5 - Patient is scheduled for follow up with Dr. Diop on 10/26/2020.\par \par

## 2020-10-07 NOTE — PHYSICAL EXAM
[General Appearance - Well Developed] : well developed [General Appearance - Well Nourished] : well nourished [General Appearance - In No Acute Distress] : no acute distress [Normal Conjunctiva] : the conjunctiva exhibited no abnormalities [Normal Oral Mucosa] : normal oral mucosa [] : no respiratory distress [Heart Rate And Rhythm] : heart rate and rhythm were normal [Heart Sounds] : normal S1 and S2 [Bowel Sounds] : normal bowel sounds [Abnormal Walk] : normal gait [Skin Color & Pigmentation] : normal skin color and pigmentation [Skin Turgor] : normal skin turgor [Oriented To Time, Place, And Person] : oriented to person, place, and time [Affect] : the affect was normal [Mood] : the mood was normal [Auscultation Breath Sounds / Voice Sounds] : lungs were clear to auscultation bilaterally [Edema] : no peripheral edema present [FreeTextEntry1] : No edema

## 2020-10-07 NOTE — REASON FOR VISIT
[FreeTextEntry1] : The patient is a 70-year-old white female with a past medical history significant for hyperlipidemia, mitral valve prolapse with severe MR status-post bio MVR, KASIE ligation and MAZE in April 2019, post-op atrial fibrillation who presents for cardiac clearance for an upcoming excision of left knee mass.

## 2020-10-07 NOTE — HISTORY OF PRESENT ILLNESS
[Coronary Artery Disease] : coronary artery disease [Asthma] : asthma [No Adverse Anesthesia Reaction] : no adverse anesthesia reaction in self or family member [(Patient denies any chest pain, claudication, dyspnea on exertion, orthopnea, palpitations or syncope)] : Patient denies any chest pain, claudication, dyspnea on exertion, orthopnea, palpitations or syncope [Aortic Stenosis] : no aortic stenosis [Recent Myocardial Infarction] : no recent myocardial infarction [Implantable Device/Pacemaker] : no implantable device/pacemaker [COPD] : no COPD [Sleep Apnea] : no sleep apnea [Smoker] : not a smoker [Chronic Anticoagulation] : no chronic anticoagulation [Chronic Kidney Disease] : no chronic kidney disease [Diabetes] : no diabetes [FreeTextEntry1] : EXCISION OF LEFT KNEE MASS [FreeTextEntry2] : October 13, 2020 [FreeTextEntry3] : Dr. Sánchez [FreeTextEntry4] : PRESENTING FOR MEDICAL CLEARANCE FOR UPCOMING SURGERY.  FEELING WELL TODAY.  SHE HAS A CHRONIC HISTORY OF CAD, ASTHMA, HYPERCHOLESTEROLEMIA AND OSTEOPENIA. HISTORY OF MITRAL VALVE PROLAPSE WITH SEVERE MRI S/P BIO MVR, KASIE LIGATION AND MAZE APRIL 2019 WITH POST-OP ATRIAL FIBRILLATION.  IS ON SIMVASTATIN AND ZETIA FOR CHOLESTEROL.  HAS NOT NEEDED RESCUE INHALER IN OVER A YEAR.  SEEING PULMONOLOGY IN A COUPLE WEEKS FOR ROUTINE FOLLOW-UP.  NO HISTORY OF MI, STROKE OR SEIZURE.  NO PERSONAL OR FAMILY HISTORY OF BLOOD DISORDERS.  DENIES EASY BLEEDING OR BRUISING.  IS ABLE TO WALK MULTIPLE BLOCKS AND GO UP MULTIPLE FLIGHTS OF STAIRS WITHOUT DIFFICULTY.   CAN WALK MILES.  HAS HAD NO HEADACHE, DIZZINESS, CHEST PAIN, SHORTNESS OF BREATH, GI OR URINARY COMPLAINTS.  NO OTHER COMPLAINTS TODAY.  [FreeTextEntry5] : POST OP PAROXYSMAL AFIB APRIL 2019 [FreeTextEntry7] : SAW CARDIOLOGY FOR CLEARANCE THIS MORNING

## 2020-10-07 NOTE — HISTORY OF PRESENT ILLNESS
[FreeTextEntry1] : Presently, Mrs. Champagne is feeling well.  Denies complaints of chest pain, shortness of breath, palpitations, lightheadedness or syncope.  She is scheduled for excision of left knee mass on 10/13/2020 with Dr. Sánchez.  Has already started to hold her baby aspirin in preparation for the procedure.

## 2020-10-07 NOTE — PLAN
[FreeTextEntry1] : OPTIMIZED MEDICALLY FOR PROPOSED SURGICAL PROCEDURE\par PRE-OPERATIVE TESTING REVIEWED\par SEE CARDIOLOGY CLEARANCE AND RECOMMENDATIONS\par EXCELLENT EXERCISE CAPACITY\par GI/DVT PROPHYLAXIS PER SURGERY\par AVOIDANCE OF NSAIDS, VITAMINS AND ASPIRIN CONTAINING PRODUCTS PRIOR TO SURGERY\par FLU VACCINE GIVEN AND TOLERATED WELL\par CALL WITH ANY QUESTIONS, CONCERNS OR CHANGES PRIOR TO PROCEDURE\par SUPPORT PROVIDED\par FOLLOW-UP POST-OPERATIVELY

## 2020-10-07 NOTE — PHYSICAL EXAM
[No Acute Distress] : no acute distress [Well Nourished] : well nourished [Well-Appearing] : well-appearing [Normal Sclera/Conjunctiva] : normal sclera/conjunctiva [PERRL] : pupils equal round and reactive to light [Normal Outer Ear/Nose] : the outer ears and nose were normal in appearance [Normal Oropharynx] : the oropharynx was normal [Normal TMs] : both tympanic membranes were normal [No Lymphadenopathy] : no lymphadenopathy [Supple] : supple [No Respiratory Distress] : no respiratory distress  [No Accessory Muscle Use] : no accessory muscle use [Clear to Auscultation] : lungs were clear to auscultation bilaterally [Normal Rate] : normal rate  [Regular Rhythm] : with a regular rhythm [Normal S1, S2] : normal S1 and S2 [Soft] : abdomen soft [Non Tender] : non-tender [Normal Bowel Sounds] : normal bowel sounds [No Joint Swelling] : no joint swelling [Grossly Normal Strength/Tone] : grossly normal strength/tone [No Rash] : no rash [Coordination Grossly Intact] : coordination grossly intact [No Focal Deficits] : no focal deficits [Normal Gait] : normal gait [Deep Tendon Reflexes (DTR)] : deep tendon reflexes were 2+ and symmetric [Speech Grossly Normal] : speech grossly normal [Normal Affect] : the affect was normal [Normal Mood] : the mood was normal [de-identified] : MOBILE MASS OVER LEFT PATELLA

## 2020-10-10 ENCOUNTER — APPOINTMENT (OUTPATIENT)
Dept: DISASTER EMERGENCY | Facility: CLINIC | Age: 70
End: 2020-10-10

## 2020-10-11 LAB — SARS-COV-2 N GENE NPH QL NAA+PROBE: NOT DETECTED

## 2020-10-12 ENCOUNTER — APPOINTMENT (OUTPATIENT)
Dept: PULMONOLOGY | Facility: CLINIC | Age: 70
End: 2020-10-12
Payer: MEDICARE

## 2020-10-12 VITALS — SYSTOLIC BLOOD PRESSURE: 110 MMHG | OXYGEN SATURATION: 96 % | DIASTOLIC BLOOD PRESSURE: 60 MMHG | HEART RATE: 71 BPM

## 2020-10-12 PROCEDURE — 99214 OFFICE O/P EST MOD 30 MIN: CPT | Mod: 25

## 2020-10-12 PROCEDURE — 94010 BREATHING CAPACITY TEST: CPT

## 2020-10-12 RX ORDER — FLUTICASONE PROPIONATE 220 MCG
AEROSOL WITH ADAPTER (GRAM) INHALATION
Refills: 0 | Status: DISCONTINUED | COMMUNITY
End: 2020-10-12

## 2020-10-12 NOTE — CONSULT LETTER
[Dear  ___] : Dear  [unfilled], [Consult Letter:] : I had the pleasure of evaluating your patient, [unfilled]. [Please see my note below.] : Please see my note below. [Sincerely,] : Sincerely, [FreeTextEntry3] : Prudencio Guardado MD FCCP\par Pulmonary/Critical Care/Sleep Medicine\par Department of Internal Medicine\par \par [facility] [employer]\par

## 2020-10-12 NOTE — HISTORY OF PRESENT ILLNESS
[Mild Persistent] : mild persistent [Doing Well] : doing well [Well Controlled] : Well controlled [None] : The patient is currently asymptomatic [Adherent] : the patient is adherent with ~his/her~ medication regimen [PFTs] : pulmonary function tests [de-identified] : s/p MVR 4/16/19, aspiration during colonoscopy 8/2020 and cleared

## 2020-10-12 NOTE — DISCUSSION/SUMMARY
[Asthma] : asthma [Stable] : stable [Responding to Treatment] : responding to treatment [Mild Persistent] : mild persistent [Patient] : the patient [Medication Changes Per Orders] : Medication changes are as documented in orders

## 2020-10-13 ENCOUNTER — RESULT REVIEW (OUTPATIENT)
Age: 70
End: 2020-10-13

## 2020-10-13 ENCOUNTER — APPOINTMENT (OUTPATIENT)
Dept: SURGERY | Facility: AMBULATORY SURGERY CENTER | Age: 70
End: 2020-10-13
Payer: MEDICARE

## 2020-10-13 PROCEDURE — 11402 EXC TR-EXT B9+MARG 1.1-2 CM: CPT

## 2020-10-14 ENCOUNTER — APPOINTMENT (OUTPATIENT)
Dept: DERMATOLOGY | Facility: CLINIC | Age: 70
End: 2020-10-14
Payer: MEDICARE

## 2020-10-14 PROCEDURE — 99214 OFFICE O/P EST MOD 30 MIN: CPT | Mod: 25

## 2020-10-14 PROCEDURE — 17000 DESTRUCT PREMALG LESION: CPT

## 2020-10-19 ENCOUNTER — APPOINTMENT (OUTPATIENT)
Dept: SURGERY | Facility: CLINIC | Age: 70
End: 2020-10-19
Payer: MEDICARE

## 2020-10-19 ENCOUNTER — APPOINTMENT (OUTPATIENT)
Dept: CARDIOLOGY | Facility: CLINIC | Age: 70
End: 2020-10-19
Payer: MEDICARE

## 2020-10-19 VITALS
BODY MASS INDEX: 22.88 KG/M2 | WEIGHT: 134 LBS | OXYGEN SATURATION: 96 % | HEIGHT: 64 IN | TEMPERATURE: 97.4 F | HEART RATE: 73 BPM | SYSTOLIC BLOOD PRESSURE: 108 MMHG | DIASTOLIC BLOOD PRESSURE: 63 MMHG

## 2020-10-19 DIAGNOSIS — R22.42 LOCALIZED SWELLING, MASS AND LUMP, LEFT LOWER LIMB: ICD-10-CM

## 2020-10-19 PROCEDURE — 99024 POSTOP FOLLOW-UP VISIT: CPT

## 2020-10-19 PROCEDURE — 93306 TTE W/DOPPLER COMPLETE: CPT

## 2020-10-19 NOTE — PHYSICAL EXAM
[JVD] : no jugular venous distention  [No Rash or Lesion] : No rash or lesion [Purpura] : no purpura  [Petechiae] : no petechiae [Skin Ulcer] : no ulcer [Alert] : alert [Skin Induration] : no induration [Oriented to Person] : oriented to person [Oriented to Place] : oriented to place [Oriented to Time] : oriented to time [Calm] : calm [de-identified] : NC/AT PERRL EOMI no scleral icterus  [de-identified] : non toxic, in no acute distress  [de-identified] : trachea midline, no gross mass  [de-identified] : no audible wheezing or stridor  [de-identified] : non distended  [de-identified] : FROM of all extremities with no gross deformity or angulation  [de-identified] : the left knee mass is surgically absent, the incision is healing well without infection there is no drainage or fluid in the surgical site  [de-identified] : mood is calm

## 2020-10-19 NOTE — HISTORY OF PRESENT ILLNESS
[de-identified] : The patient is without complaints.  She has no pain, no swelling, and no drainage from the incision site.  She is very pleased with her surgical results thus far.

## 2020-10-19 NOTE — ASSESSMENT
[FreeTextEntry1] : The patient is stable and doing well following excision of a left knee mass that turned out to be a benign cyst.  She will follow up as needed from this point forward.

## 2020-10-26 ENCOUNTER — APPOINTMENT (OUTPATIENT)
Dept: CARDIOLOGY | Facility: CLINIC | Age: 70
End: 2020-10-26
Payer: MEDICARE

## 2020-10-26 ENCOUNTER — NON-APPOINTMENT (OUTPATIENT)
Age: 70
End: 2020-10-26

## 2020-10-26 VITALS
DIASTOLIC BLOOD PRESSURE: 78 MMHG | WEIGHT: 133 LBS | RESPIRATION RATE: 15 BRPM | HEART RATE: 70 BPM | TEMPERATURE: 96.4 F | SYSTOLIC BLOOD PRESSURE: 112 MMHG | HEIGHT: 64 IN | BODY MASS INDEX: 22.71 KG/M2

## 2020-10-26 PROCEDURE — 93000 ELECTROCARDIOGRAM COMPLETE: CPT

## 2020-10-26 PROCEDURE — 99214 OFFICE O/P EST MOD 30 MIN: CPT

## 2020-10-26 NOTE — DISCUSSION/SUMMARY
[FreeTextEntry1] : Patient is a 69yo F with HLD, MVP with severe MR s/p bio MVR, KASIE ligation and MAZE 4/2019, mild CAD (30% LAD stenosis cath 2019) post op AF here for cardiac evaluation. Post op course complicated and now seems to be slowly improving. Had one episode PAF during cardiac rehab fall 2019. Currently doing quite well and recovering with improved energy and no SOB. Echo with well seated MVR, no change in gradient. Echo essentially unchanged.Treated for aspiration PNA in Septmeber and has recovered well. \par \par \par 1. Continue metoprol ER 50mg bid\par 2. Continue ASA/statin/zetia, has mild CAD.\par 3. Continue with regular exercise as she is doing\par 4. Now off A/C, had KASIE ligation so continue ASA only. \par 5. Follow up 6 months \par \par

## 2020-10-26 NOTE — ASSESSMENT
[FreeTextEntry1] : ECG: SR, septal Q waves, NSST\par \par LHC (pre-op, 4/2019)\par 1. 30% mid LAD stenosis\par \par ECHO 10/2020:\par 1. Sigmoid septum without obstruction\par 2. NOrmal LV function, EF 65-70%\par 3. Grade I diastolic dysfunction\par 4. Low normal RV function\par 5. Severe LAE, mild KALPANA\par 6. S/P MVR, mean gradient 4mmHg, PHT 144msec, trace regurgitaiton\par 7. MOderate TR, RVSP 35mmHg\par \par ECHO 11/2019:\par 1. Mild LVH, EF 60%\par 2. Mild RVE with normal systolic function\par 3. Severe LAE, normal RA\par 4. Mild aortic regurgitation\par 5. Well seated bio MVR, mean gradient 5mmHg, DVI = 1.4 \par 6. Moderate TR, RVSP 37mmHg

## 2020-10-26 NOTE — PHYSICAL EXAM
[General Appearance - Well Developed] : well developed [General Appearance - Well Nourished] : well nourished [Normal Conjunctiva] : the conjunctiva exhibited no abnormalities [Normal Oropharynx] : normal oropharynx [Normal Jugular Venous V Waves Present] : normal jugular venous V waves present [] : no respiratory distress [Respiration, Rhythm And Depth] : normal respiratory rhythm and effort [Heart Rate And Rhythm] : heart rate and rhythm were normal [Heart Sounds] : normal S1 and S2 [Bowel Sounds] : normal bowel sounds [Abdomen Soft] : soft [Abdomen Tenderness] : non-tender [Abnormal Walk] : normal gait [Nail Clubbing] : no clubbing of the fingernails Normal rate, regular rhythm.  Heart sounds S1, S2.  No murmurs, rubs or gallops. [Cyanosis, Localized] : no localized cyanosis [Skin Color & Pigmentation] : normal skin color and pigmentation [Skin Turgor] : normal skin turgor [Oriented To Time, Place, And Person] : oriented to person, place, and time [Affect] : the affect was normal [FreeTextEntry1] : no edema

## 2020-10-26 NOTE — HISTORY OF PRESENT ILLNESS
[FreeTextEntry1] : Patient is a 70yo F with HLD, MVP with severe MR s/p bio MVR, KASIE ligation and MAZE 4/2019, post op AF here for cardiac followup. Had been having issues with dizziness and beta blocker increased to treat BP and lower heart rate due to increased gradient across MVR. States when weaned off atenolol had incapacitating episodes of lightheadedness. Put on atenolol and then changed to metoprolol\par \par Feeling fairly well. No new complaints, had recent knee surgery to aspirate cyst. Also had aspiration event after colonoscopy in SEptember. Had PNA and was on antibiotics. 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 Lives alone.  has been helping care for her mother who was 89 and recently passed earlier this year.  Will be going to [place] soon. \par \par ROS: GI and  negative

## 2020-12-23 PROBLEM — Z01.419 ENCOUNTER FOR GYNECOLOGICAL EXAMINATION WITH PAPANICOLAOU SMEAR OF CERVIX: Status: RESOLVED | Noted: 2020-01-22 | Resolved: 2020-12-23

## 2021-05-10 ENCOUNTER — APPOINTMENT (OUTPATIENT)
Dept: OBGYN | Facility: CLINIC | Age: 71
End: 2021-05-10

## 2021-05-24 ENCOUNTER — RX RENEWAL (OUTPATIENT)
Age: 71
End: 2021-05-24

## 2021-05-27 ENCOUNTER — APPOINTMENT (OUTPATIENT)
Dept: CARDIOLOGY | Facility: CLINIC | Age: 71
End: 2021-05-27
Payer: MEDICARE

## 2021-05-27 ENCOUNTER — NON-APPOINTMENT (OUTPATIENT)
Age: 71
End: 2021-05-27

## 2021-05-27 VITALS
SYSTOLIC BLOOD PRESSURE: 103 MMHG | HEIGHT: 64 IN | WEIGHT: 137 LBS | TEMPERATURE: 96.5 F | BODY MASS INDEX: 23.39 KG/M2 | HEART RATE: 68 BPM | DIASTOLIC BLOOD PRESSURE: 69 MMHG | RESPIRATION RATE: 15 BRPM

## 2021-05-27 DIAGNOSIS — R06.00 DYSPNEA, UNSPECIFIED: ICD-10-CM

## 2021-05-27 DIAGNOSIS — E78.00 PURE HYPERCHOLESTEROLEMIA, UNSPECIFIED: ICD-10-CM

## 2021-05-27 PROCEDURE — 93000 ELECTROCARDIOGRAM COMPLETE: CPT

## 2021-05-27 PROCEDURE — 99214 OFFICE O/P EST MOD 30 MIN: CPT

## 2021-05-27 NOTE — ASSESSMENT
[FreeTextEntry1] : ECG: SR vs ectopic atrial rhythm, short CA, septal Q waves (unchanged from prior)\par \par LHC (pre-op, 4/2019)\par 1. 30% mid LAD stenosis\par \par ECHO 10/2020:\par 1. Sigmoid septum without obstruction\par 2. NOrmal LV function, EF 65-70%\par 3. Grade I diastolic dysfunction\par 4. Low normal RV function\par 5. Severe LAE, mild KALPANA\par 6. S/P MVR, mean gradient 4mmHg, PHT 144msec, trace regurgitaiton\par 7. MOderate TR, RVSP 35mmHg\par \par ECHO 11/2019:\par 1. Mild LVH, EF 60%\par 2. Mild RVE with normal systolic function\par 3. Severe LAE, normal RA\par 4. Mild aortic regurgitation\par 5. Well seated bio MVR, mean gradient 5mmHg, DVI = 1.4 \par 6. Moderate TR, RVSP 37mmHg

## 2021-05-27 NOTE — PHYSICAL EXAM
[General Appearance - Well Developed] : well developed [General Appearance - Well Nourished] : well nourished [Normal Conjunctiva] : the conjunctiva exhibited no abnormalities [Normal Oropharynx] : normal oropharynx [Normal Jugular Venous V Waves Present] : normal jugular venous V waves present [] : no respiratory distress [Respiration, Rhythm And Depth] : normal respiratory rhythm and effort [Heart Rate And Rhythm] : heart rate and rhythm were normal [Heart Sounds] : normal S1 and S2 [Bowel Sounds] : normal bowel sounds [Abdomen Soft] : soft [Abdomen Tenderness] : non-tender [Abnormal Walk] : normal gait [Nail Clubbing] : no clubbing of the fingernails [Cyanosis, Localized] : no localized cyanosis [Skin Color & Pigmentation] : normal skin color and pigmentation [Skin Turgor] : normal skin turgor [Oriented To Time, Place, And Person] : oriented to person, place, and time [Affect] : the affect was normal [FreeTextEntry1] : no edema

## 2021-05-27 NOTE — HISTORY OF PRESENT ILLNESS
[FreeTextEntry1] : Patient is a yo F with HLD, MVP with severe MR s/p bio MVR, KASIE ligation and MAZE 4/2019, post op AF here for cardiac followup. Since being back from [place], felt some dyspnea with exertion. Also couple episodes of dizziness. HAd to sit down after walking into room. NO vertigo, normal fluid intake during the day. Another episode while at a store. NO palpitations at the time. BP running 90s/50s, sometimes up to 100. NO CP. No PND/orthopnea/palps. Mild allergy symptoms, feels pollen in her throat she states. HAd cerumen impaction and had removed by ENT, no symptoms since. 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 Lives alone. \par \par ROS: GI and  negative

## 2021-05-27 NOTE — DISCUSSION/SUMMARY
[FreeTextEntry1] : Patient is a 6yo F with HLD, MVP with severe MR s/p bio MVR, KASIE ligation and MAZE 4/2019, mild CAD (30% LAD stenosis cath 2019) post op AF here for follow up. \par -Had one episode PAF during cardiac rehab fall 2019, none since. \par -Echo fall 2020 with well seated MVR, meand gradient 4mmHg, severe LAE\par -REcent mild dyspnea, and episodes dizziness. Dizziness possilby related to cerumen impaction. Dyspnea less clear. \par \par \par 1. Trial of changing metoprolol to 25mg qam and 50gm qhs due to dizziness and low BP\par 2. Echo to evaluate MVR due to dyspnea/dizziness. Will consider stress echo if sx continue\par 3. Continue ASA/statin/zetia, has mild CAD.\par 4. Continue with regular exercise as she is doing\par 4. Now off A/C, had KASIE ligation so continue ASA only. \par 5. Follow up in next few weeks \par 6. Check BW, CMP, Mg, CBC\par \par

## 2021-05-28 LAB
ALBUMIN SERPL ELPH-MCNC: 4.3 G/DL
ALP BLD-CCNC: 95 U/L
ALT SERPL-CCNC: 20 U/L
ANION GAP SERPL CALC-SCNC: 11 MMOL/L
AST SERPL-CCNC: 27 U/L
BASOPHILS # BLD AUTO: 0.04 K/UL
BASOPHILS NFR BLD AUTO: 0.7 %
BILIRUB SERPL-MCNC: 0.4 MG/DL
BUN SERPL-MCNC: 20 MG/DL
CALCIUM SERPL-MCNC: 10 MG/DL
CHLORIDE SERPL-SCNC: 105 MMOL/L
CO2 SERPL-SCNC: 22 MMOL/L
CREAT SERPL-MCNC: 0.65 MG/DL
EOSINOPHIL # BLD AUTO: 0.19 K/UL
EOSINOPHIL NFR BLD AUTO: 3.3 %
GLUCOSE SERPL-MCNC: 101 MG/DL
HCT VFR BLD CALC: 44.6 %
HGB BLD-MCNC: 14 G/DL
IMM GRANULOCYTES NFR BLD AUTO: 0.2 %
LYMPHOCYTES # BLD AUTO: 2.09 K/UL
LYMPHOCYTES NFR BLD AUTO: 35.8 %
MAGNESIUM SERPL-MCNC: 2.1 MG/DL
MAN DIFF?: NORMAL
MCHC RBC-ENTMCNC: 29 PG
MCHC RBC-ENTMCNC: 31.4 GM/DL
MCV RBC AUTO: 92.3 FL
MONOCYTES # BLD AUTO: 0.63 K/UL
MONOCYTES NFR BLD AUTO: 10.8 %
NEUTROPHILS # BLD AUTO: 2.88 K/UL
NEUTROPHILS NFR BLD AUTO: 49.2 %
NT-PROBNP SERPL-MCNC: 309 PG/ML
PLATELET # BLD AUTO: 240 K/UL
POTASSIUM SERPL-SCNC: 4.4 MMOL/L
PROT SERPL-MCNC: 7.6 G/DL
RBC # BLD: 4.83 M/UL
RBC # FLD: 13 %
SODIUM SERPL-SCNC: 139 MMOL/L
WBC # FLD AUTO: 5.84 K/UL

## 2021-06-15 ENCOUNTER — APPOINTMENT (OUTPATIENT)
Dept: OBGYN | Facility: CLINIC | Age: 71
End: 2021-06-15

## 2021-06-17 ENCOUNTER — APPOINTMENT (OUTPATIENT)
Dept: CARDIOLOGY | Facility: CLINIC | Age: 71
End: 2021-06-17
Payer: MEDICARE

## 2021-06-17 PROCEDURE — 93306 TTE W/DOPPLER COMPLETE: CPT

## 2021-06-24 ENCOUNTER — APPOINTMENT (OUTPATIENT)
Dept: CARDIOLOGY | Facility: CLINIC | Age: 71
End: 2021-06-24
Payer: MEDICARE

## 2021-06-24 ENCOUNTER — NON-APPOINTMENT (OUTPATIENT)
Age: 71
End: 2021-06-24

## 2021-06-24 VITALS
BODY MASS INDEX: 23.05 KG/M2 | DIASTOLIC BLOOD PRESSURE: 68 MMHG | SYSTOLIC BLOOD PRESSURE: 107 MMHG | WEIGHT: 135 LBS | HEART RATE: 63 BPM | HEIGHT: 64 IN | OXYGEN SATURATION: 94 % | RESPIRATION RATE: 16 BRPM

## 2021-06-24 DIAGNOSIS — R53.83 OTHER FATIGUE: ICD-10-CM

## 2021-06-24 PROCEDURE — 93000 ELECTROCARDIOGRAM COMPLETE: CPT

## 2021-06-24 PROCEDURE — 99214 OFFICE O/P EST MOD 30 MIN: CPT

## 2021-06-24 NOTE — ASSESSMENT
[FreeTextEntry1] : ECG: SR, septal Q waves, NSST \par \par  (5/2021)\par CBC/CMP/Mg unremarkable (5/2021)\par \par \par ECHO 6/2021:\par 1. Mildly increased septal wall thickness, EF 70-75%\par 2. Mild RVE with normal function\par 3. Severe LAE, mild KALPANA\par 4. Well seated bio MVR, DVI = 1.98, mean gradient 4.4mmHg, PHT 102msec\par 5. Moderate TR, RVSP 34mmHg\par \par ECHO 10/2020:\par 1. Sigmoid septum without obstruction\par 2. NOrmal LV function, EF 65-70%\par 3. Grade I diastolic dysfunction\par 4. Low normal RV function\par 5. Severe LAE, mild KALPANA\par 6. S/P MVR, mean gradient 4mmHg, PHT 144msec, trace regurgitaiton\par 7. MOderate TR, RVSP 35mmHg\par \par LHC (pre-op, 4/2019)\par 1. 30% mid LAD stenosis\par \par \par ECHO 11/2019:\par 1. Mild LVH, EF 60%\par 2. Mild RVE with normal systolic function\par 3. Severe LAE, normal RA\par 4. Mild aortic regurgitation\par 5. Well seated bio MVR, mean gradient 5mmHg, DVI = 1.4 \par 6. Moderate TR, RVSP 37mmHg

## 2021-06-24 NOTE — DISCUSSION/SUMMARY
[FreeTextEntry1] : Patient is a 72yo F with HLD, MVP with severe MR s/p bio MVR, KASIE ligation and MAZE 4/2019, mild CAD (30% LAD stenosis cath 2019) post op AF here for follow up. \par -Had one episode PAF during cardiac rehab fall 2019, none since. \par -Echo 6/2021 with well seated MVR, meand gradient 4.4mmHg, severe LAE, ,moderate TR, RVSP 34\par -REcent mild dyspnea, and episodes dizziness. Dizziness possibly related to cerumen impaction. Dyspnea less clear. Echo unchanged, BW unremarkable (CBC/CMP/BNP)\par -Suspect symptoms maybe anxiety/depression, has been very difficult year for her. Immediately prior to pandemic mother passed as well. She contacted therapist and will follow up\par \par \par 1. CV stable at this time. Increase metoprolol back to 50mg po bid as seemed to feel better on this\par 2. Follow up with therapist to help with anxiety/depression\par 3. Continue ASA/statin/zetia, has mild CAD.\par 4. Continue with regular exercise \par 4. Now off A/C, had KASIE ligation so continue ASA only. \par 5. Follow up 6 months\par \par

## 2021-06-24 NOTE — HISTORY OF PRESENT ILLNESS
[FreeTextEntry1] : Patient is a 70yo F with HLD, MVP with severe MR s/p bio MVR, KASIE ligation and MAZE 4/2019, post op AF here for cardiac followup. Since being back from [place], felt some dyspnea with exertion. Also couple episodes of dizziness. HAd to sit down after walking into room. NO vertigo, normal fluid intake during the day. Another episode while at a store. NO palpitations at the time. BP running 90s/50s, sometimes up to 100. NO CP. No PND/orthopnea/palps. Mild allergy symptoms, feels pollen in her throat she states. HAd cerumen impaction and had removed by ENT, no symptoms since. Echo done , metoprolol changed to 25mg qam and 50mg qpm. Thinks was feeling better on 50mg po bid. Energy a bit down, but no SOB. No recurrent dizziness. Poor sleep since COVID and anxiety as well. 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 Lives alone. \par \par ROS: GI and  negative

## 2021-07-06 ENCOUNTER — APPOINTMENT (OUTPATIENT)
Dept: OBGYN | Facility: CLINIC | Age: 71
End: 2021-07-06
Payer: MEDICARE

## 2021-07-06 VITALS
TEMPERATURE: 97.3 F | DIASTOLIC BLOOD PRESSURE: 70 MMHG | HEIGHT: 64 IN | WEIGHT: 137 LBS | SYSTOLIC BLOOD PRESSURE: 118 MMHG | BODY MASS INDEX: 23.39 KG/M2

## 2021-07-06 DIAGNOSIS — Z01.419 ENCOUNTER FOR GYNECOLOGICAL EXAMINATION (GENERAL) (ROUTINE) W/OUT ABNORMAL FINDINGS: ICD-10-CM

## 2021-07-06 DIAGNOSIS — R42 DIZZINESS AND GIDDINESS: ICD-10-CM

## 2021-07-06 DIAGNOSIS — M85.80 OTHER SPECIFIED DISORDERS OF BONE DENSITY AND STRUCTURE, UNSPECIFIED SITE: ICD-10-CM

## 2021-07-06 PROCEDURE — G0101: CPT

## 2021-07-06 NOTE — REVIEW OF SYSTEMS
[Patient Intake Form Reviewed] : Patient intake form was reviewed [Sight Problems] : sight problems [Sleep Disturbances] : sleep disturbances [Negative] : Heme/Lymph

## 2021-07-06 NOTE — COUNSELING
[Nutrition/ Exercise/ Weight Management] : nutrition, exercise, weight management [Vitamins/Supplements] : vitamins/supplements [Sunscreen] : sunscreen [Breast Self Exam] : breast self exam [Vaccines] : vaccines

## 2021-07-06 NOTE — PLAN
[FreeTextEntry1] : Follow-up with cardiology and primary care for any dizziness-Importance emphasized

## 2021-07-06 NOTE — HISTORY OF PRESENT ILLNESS
[N] : Patient denies prior pregnancies [LMP unknown] : LMP unknown [No] : Patient does not have concerns regarding sex [Previously active] : previously active [TextBox_4] : 71-year-old patient presents for annual GYN exam\par Patient denies any vaginal bleeding\par \par She is having a rough year after her mother , on top of the isolation from Covid and her recent half-way from nursing has her left feeling out of sorts-feels she has severe phobia of getting Covid which is not allowing her to live a normal life-she is in fear\par I encouraged therapy and patient is interested in a referral was given to Amira craven-patient denies ideation.  Patient did receive the Covid vaccine\par \par Patient has a history of heart surgery with Kobe Bergman\par \par Patient stating she has had episodes of dizziness twice for which I advised she follow-up with cardiology and consider EPS-she is presently asymptomatic-denies chest pain shortness of breath palpitations or feeling dizzy-warning signs for immediate ER attention reviewed\par \par Patient is living half the year in [place] now\par \par Last bone density  noting osteopenia\par Last mammogram 2020 BI-RADS 2\par Last Pap smear was 2020 -\par Last colonoscopy was -patient had a post procedure reaction possible aspiration with severe fever chills-\par  [Mammogramdate] : 9/29/20 [TextBox_19] : br 2 [TextBox_25] : br 2 [PapSmeardate] : 1/22/20 [TextBox_31] : negative [PGHxTotal] : 0 [PGHxFullTerm] : 0 [[facility]] : 0

## 2021-07-11 ENCOUNTER — NON-APPOINTMENT (OUTPATIENT)
Age: 71
End: 2021-07-11

## 2021-07-19 LAB
CYTOLOGY CVX/VAG DOC THIN PREP: ABNORMAL
HPV HIGH+LOW RISK DNA PNL CVX: NOT DETECTED

## 2021-08-10 ENCOUNTER — APPOINTMENT (OUTPATIENT)
Dept: FAMILY MEDICINE | Facility: CLINIC | Age: 71
End: 2021-08-10
Payer: MEDICARE

## 2021-08-10 ENCOUNTER — NON-APPOINTMENT (OUTPATIENT)
Age: 71
End: 2021-08-10

## 2021-08-10 VITALS
DIASTOLIC BLOOD PRESSURE: 70 MMHG | BODY MASS INDEX: 23.05 KG/M2 | HEART RATE: 70 BPM | SYSTOLIC BLOOD PRESSURE: 120 MMHG | HEIGHT: 64 IN | WEIGHT: 135 LBS

## 2021-08-10 PROCEDURE — 36415 COLL VENOUS BLD VENIPUNCTURE: CPT

## 2021-08-10 PROCEDURE — G0439: CPT

## 2021-08-10 PROCEDURE — G0444 DEPRESSION SCREEN ANNUAL: CPT | Mod: 59

## 2021-08-10 NOTE — HISTORY OF PRESENT ILLNESS
[FreeTextEntry1] : WELLNESS EXAM [de-identified] : MS. BURKS IS A PLEASANT 72 YO PRESENTING FOR YEARLY WELLNESS EXAM.  CHRONIC HISTORY OF CAD, ASTHMA, HYPERCHOLESTEROLEMIA AND OSTEOPENIA. HISTORY OF MITRAL VALVE PROLAPSE WITH SEVERE MRI S/P BIO MVR, KASIE LIGATION AND MAZE APRIL 2019 WITH POST-OP ATRIAL FIBRILLATION. IS ON SIMVASTATIN FOR CHOLESTEROL.  ROUTINELY FOLLOWING UP WITH CARDIOLOGY.  RECENT GYN FOR ANNUAL EXAM.  SEES DERMATOLOGY YEARLY FOR SCREENING.  ROUTINE EYE EXAMS.  PULMONOLOGY SEEN IN OCTOBER IN FOLLOW-UP OF ASTHMA WHICH HAS BEEN CONTROLLED.  FEELING ANXIOUS WITH COVID PANDEMIC.  STARTED SEEING THERAPIST THE PAST TWO WEEKS.

## 2021-08-10 NOTE — PLAN
[FreeTextEntry1] : VISION SCREENING\par HEALTHY DIET AND LIFESTYLE MODIFICATIONS\par CHECK ROUTINE LAB WORK\par VACCINATIONS REVIEWED; FLU VACCINE IN THE FALL\par UP TO DATE WITH GYN, MAMMOGRAM, BONE DENSITY AND COLONOSCOPY\par FOLLOW-UP ALL SPECIALISTS AS DIRECTED \par STRESS REDUCTION\par CALL WITH ANY QUESTIONS, CONCERNS OR CHANGES

## 2021-08-10 NOTE — HEALTH RISK ASSESSMENT
[Very Good] : ~his/her~ current health as very good [Good] : ~his/her~  mood as  good [Yes] : Yes [Monthly or less (1 pt)] : Monthly or less (1 point) [1 or 2 (0 pts)] : 1 or 2 (0 points) [Never (0 pts)] : Never (0 points) [No] : In the past 12 months have you used drugs other than those required for medical reasons? No [No falls in past year] : Patient reported no falls in the past year [HIV Test offered] : HIV Test offered [Hepatitis C test offered] : Hepatitis C test offered [None] : None [Alone] : lives alone [Retired] : retired [College] : College [Single] : single [Feels Safe at Home] : Feels safe at home [Fully functional (bathing, dressing, toileting, transferring, walking, feeding)] : Fully functional (bathing, dressing, toileting, transferring, walking, feeding) [Fully functional (using the telephone, shopping, preparing meals, housekeeping, doing laundry, using] : Fully functional and needs no help or supervision to perform IADLs (using the telephone, shopping, preparing meals, housekeeping, doing laundry, using transportation, managing medications and managing finances) [Reports normal functional visual acuity (ie: able to read med bottle)] : Reports normal functional visual acuity [Smoke Detector] : smoke detector [Carbon Monoxide Detector] : carbon monoxide detector [Safety elements used in home] : safety elements used in home [Seat Belt] :  uses seat belt [Sunscreen] : uses sunscreen [With Patient/Caregiver] : , with patient/caregiver [Designated Healthcare Proxy] : Designated healthcare proxy [Name: ___] : Health Care Proxy's Name: [unfilled]  [Relationship: ___] : Relationship: [unfilled] [] : No [Audit-CScore] : 1 [de-identified] : golfing, walking, yoga, swimming [de-identified] : "pretty good" [Change in mental status noted] : No change in mental status noted [Language] : denies difficulty with language [Learning/Retaining New Information] : denies difficulty learning/retaining new information [Handling Complex Tasks] : denies difficulty handling complex tasks [Sexually Active] : not sexually active [High Risk Behavior] : no high risk behavior [Reports changes in hearing] : Reports no changes in hearing [Reports changes in vision] : Reports no changes in vision [Reports changes in dental health] : Reports no changes in dental health [MammogramDate] : 09/20 [PapSmearDate] : 07/21 [BoneDensityDate] : 08/20 [ColonoscopyDate] : 09/20 [de-identified] : glasses for distance and reading - recent eye exam [de-identified] : appt next week with dentist [AdvancecareDate] : 08/21 [FreeTextEntry4] : secondary heatlh care proxy: brother - Diego Champagne

## 2021-08-11 LAB
25(OH)D3 SERPL-MCNC: 55.8 NG/ML
APPEARANCE: CLEAR
BILIRUBIN URINE: NEGATIVE
BLOOD URINE: NEGATIVE
CHOLEST SERPL-MCNC: 173 MG/DL
COLOR: YELLOW
ESTIMATED AVERAGE GLUCOSE: 111 MG/DL
GLUCOSE QUALITATIVE U: NEGATIVE
HBA1C MFR BLD HPLC: 5.5 %
HDLC SERPL-MCNC: 71 MG/DL
KETONES URINE: NEGATIVE
LDLC SERPL CALC-MCNC: 87 MG/DL
LEUKOCYTE ESTERASE URINE: NEGATIVE
NITRITE URINE: NEGATIVE
NONHDLC SERPL-MCNC: 103 MG/DL
PH URINE: 6.5
PROTEIN URINE: NORMAL
SPECIFIC GRAVITY URINE: 1.03
T4 FREE SERPL-MCNC: 1.2 NG/DL
TRIGL SERPL-MCNC: 80 MG/DL
TSH SERPL-ACNC: 2.16 UIU/ML
UROBILINOGEN URINE: NORMAL

## 2021-09-07 ENCOUNTER — RX RENEWAL (OUTPATIENT)
Age: 71
End: 2021-09-07

## 2021-09-13 ENCOUNTER — RESULT CHARGE (OUTPATIENT)
Age: 71
End: 2021-09-13

## 2021-09-13 ENCOUNTER — APPOINTMENT (OUTPATIENT)
Age: 71
End: 2021-09-13
Payer: MEDICARE

## 2021-09-13 ENCOUNTER — APPOINTMENT (OUTPATIENT)
Age: 71
End: 2021-09-13

## 2021-09-13 VITALS
DIASTOLIC BLOOD PRESSURE: 78 MMHG | BODY MASS INDEX: 22.53 KG/M2 | WEIGHT: 132 LBS | SYSTOLIC BLOOD PRESSURE: 138 MMHG | HEIGHT: 64 IN

## 2021-09-13 DIAGNOSIS — N36.2 URETHRAL CARUNCLE: ICD-10-CM

## 2021-09-13 DIAGNOSIS — N81.9 FEMALE GENITAL PROLAPSE, UNSPECIFIED: ICD-10-CM

## 2021-09-13 DIAGNOSIS — R35.1 NOCTURIA: ICD-10-CM

## 2021-09-13 LAB
BILIRUB UR QL STRIP: NEGATIVE
CLARITY UR: CLEAR
COLLECTION METHOD: NORMAL
GLUCOSE UR-MCNC: NEGATIVE
HCG UR QL: 0.2 EU/DL
HGB UR QL STRIP.AUTO: NEGATIVE
KETONES UR-MCNC: NEGATIVE
LEUKOCYTE ESTERASE UR QL STRIP: NORMAL
NITRITE UR QL STRIP: NEGATIVE
PH UR STRIP: 5.5
PROT UR STRIP-MCNC: NEGATIVE
SP GR UR STRIP: 1.02

## 2021-09-13 PROCEDURE — 99205 OFFICE O/P NEW HI 60 MIN: CPT | Mod: 25

## 2021-09-13 PROCEDURE — 81003 URINALYSIS AUTO W/O SCOPE: CPT | Mod: QW

## 2021-09-13 PROCEDURE — 51701 INSERT BLADDER CATHETER: CPT

## 2021-09-13 NOTE — PHYSICAL EXAM
[Chaperone Present] : A chaperone was present in the examining room during all aspects of the physical examination [No Acute Distress] : in no acute distress [Well developed] : well developed [Well Nourished] : ~L well nourished [Oriented x3] : oriented to person, place, and time [Normal Memory] : ~T memory was ~L unimpaired [Normal Mood/Affect] : mood and affect are normal [Cough] : no cough [No Edema] : ~T edema was not present [Tenderness] : ~T no ~M abdominal tenderness observed [Distended] : not distended [None] : no CVA tenderness [Inguinal LAD] : no adenopathy was noted in the inguinal lymph nodes [Warm and Dry] : was warm and dry to touch [Normal Gait] : gait was normal [Vulvar Atrophy] : vulvar atrophy [Labia Majora] : were normal [Labia Minora] : were normal [Atrophy] : atrophy [2] : 2 [Soft] :  the cervix was soft [Uterine Adnexae] : were not tender and not enlarged [Normal] : no abnormalities [Post Void Residual ____ml] : post void residual was [unfilled] ml [FreeTextEntry3] : 3mm urethral polyp [de-identified] : no prolapse

## 2021-09-13 NOTE — LETTER BODY
[Dear  ___] : Dear ~JEANNETTE, [Attached please find my note.] : Attached please find my note. [Thank you very much for allowing me to participate in the care of this patient. If you have any questions, please do not hesitate to contact me] : Thank you very much for allowing me to participate in the care of this patient. If you have any questions, please do not hesitate to contact me.

## 2021-09-13 NOTE — HISTORY OF PRESENT ILLNESS
[Cystocele (Obstetric)] : no [Vaginal Wall Prolapse] : no [Rectal Prolapse] : no [Unable To Restrain Bowel Movement] : no [Urinary Tract Infection] : no [x1] : nocturia once nightly [Urinary Frequency] : no [Feelings Of Urinary Urgency] : no [Pain During Urination (Dysuria)] : no [Constipation Obstructed Defecation] : no [] : no [Pelvic Pain] : no [Vaginal Pain] : no [Vulvar Pain] : no [FreeTextEntry1] : \par 70 y/o referred for prolapse, reports the bulge is not bothersome,was told by GYN she has prolapse, no urinary symptoms, thinks she empties, maybe not at the end of the day, denies incontinence, denies frequency/urgency, denies hematuria, denies UTIs, denies vaginal bleeding, denies splinting, denies constipation, she has a urethral polyp, not using estrogen \par

## 2021-09-13 NOTE — PROCEDURE
[Straight Catheterization] : insertion of a straight catheter [Other ___] : [unfilled] [Patient] : the patient [None] : none [___ Fr Straight Tip] : a [unfilled] in Chinese straight tip catheter [Clear] : clear [Culture] : culture [Urinalysis] : urinalysis [No Complications] : no complications [Tolerated Well] : the patient tolerated the procedure well [Post procedure instructions and information given] : Post procedure instructions and information were given and reviewed with patient. [0] : 0

## 2021-09-13 NOTE — DISCUSSION/SUMMARY
[FreeTextEntry1] : \par Amrita presents for eval of urethral polyp, small benign appearing polyp on exam with severe atrophy. No POP, normal PVR, negative CST. Discussed risks/benefits of vaginal estrogen and she will start premarin, instructions for use reviewed. will return in 6 months for f/u, at this time there is no indication for surgical management. All questions were answered.\par \par [] u/a, cx\par [] premarin\par [] return in 6 months

## 2021-09-13 NOTE — OB HISTORY
[Vaginal ___] : [unfilled] vaginal delivery(s) [Definite ___ (Date)] : the last menstrual period was [unfilled] [Last Pap Smear ___] : date of last pap smear was on [unfilled] [Very Satisfied] : very satisfied [Sexually Active] : is not sexually active

## 2021-09-15 LAB
APPEARANCE: CLEAR
BACTERIA UR CULT: NORMAL
BACTERIA: NEGATIVE
BILIRUBIN URINE: NEGATIVE
BLOOD URINE: NEGATIVE
COLOR: YELLOW
GLUCOSE QUALITATIVE U: NEGATIVE
HYALINE CASTS: 1 /LPF
KETONES URINE: NEGATIVE
LEUKOCYTE ESTERASE URINE: NEGATIVE
MICROSCOPIC-UA: NORMAL
NITRITE URINE: NEGATIVE
PH URINE: 5.5
PROTEIN URINE: NORMAL
RED BLOOD CELLS URINE: 1 /HPF
SPECIFIC GRAVITY URINE: 1.03
SQUAMOUS EPITHELIAL CELLS: 1 /HPF
UROBILINOGEN URINE: NORMAL
WHITE BLOOD CELLS URINE: 2 /HPF

## 2021-09-22 RX ORDER — CONJUGATED ESTROGENS 0.62 MG/G
0.62 CREAM VAGINAL
Qty: 1 | Refills: 0 | Status: DISCONTINUED | COMMUNITY
Start: 2021-09-13 | End: 2021-09-22

## 2021-10-01 ENCOUNTER — RESULT REVIEW (OUTPATIENT)
Age: 71
End: 2021-10-01

## 2021-10-01 ENCOUNTER — APPOINTMENT (OUTPATIENT)
Dept: MAMMOGRAPHY | Facility: CLINIC | Age: 71
End: 2021-10-01
Payer: MEDICARE

## 2021-10-01 ENCOUNTER — APPOINTMENT (OUTPATIENT)
Dept: ULTRASOUND IMAGING | Facility: CLINIC | Age: 71
End: 2021-10-01
Payer: MEDICARE

## 2021-10-01 ENCOUNTER — OUTPATIENT (OUTPATIENT)
Dept: OUTPATIENT SERVICES | Facility: HOSPITAL | Age: 71
LOS: 1 days | End: 2021-10-01
Payer: MEDICARE

## 2021-10-01 DIAGNOSIS — Z00.00 ENCOUNTER FOR GENERAL ADULT MEDICAL EXAMINATION WITHOUT ABNORMAL FINDINGS: ICD-10-CM

## 2021-10-01 DIAGNOSIS — H26.9 UNSPECIFIED CATARACT: Chronic | ICD-10-CM

## 2021-10-01 DIAGNOSIS — D24.2 BENIGN NEOPLASM OF LEFT BREAST: Chronic | ICD-10-CM

## 2021-10-01 DIAGNOSIS — Z98.890 OTHER SPECIFIED POSTPROCEDURAL STATES: Chronic | ICD-10-CM

## 2021-10-01 PROCEDURE — 76641 ULTRASOUND BREAST COMPLETE: CPT

## 2021-10-01 PROCEDURE — 77063 BREAST TOMOSYNTHESIS BI: CPT

## 2021-10-01 PROCEDURE — 77063 BREAST TOMOSYNTHESIS BI: CPT | Mod: 26

## 2021-10-01 PROCEDURE — 76641 ULTRASOUND BREAST COMPLETE: CPT | Mod: 26,50

## 2021-10-01 PROCEDURE — 77067 SCR MAMMO BI INCL CAD: CPT | Mod: 26

## 2021-10-01 PROCEDURE — 77067 SCR MAMMO BI INCL CAD: CPT

## 2021-10-05 ENCOUNTER — APPOINTMENT (OUTPATIENT)
Dept: DERMATOLOGY | Facility: CLINIC | Age: 71
End: 2021-10-05
Payer: MEDICARE

## 2021-10-05 ENCOUNTER — TRANSCRIPTION ENCOUNTER (OUTPATIENT)
Age: 71
End: 2021-10-05

## 2021-10-05 PROCEDURE — 17003 DESTRUCT PREMALG LES 2-14: CPT

## 2021-10-05 PROCEDURE — 99214 OFFICE O/P EST MOD 30 MIN: CPT | Mod: 25

## 2021-10-05 PROCEDURE — 17000 DESTRUCT PREMALG LESION: CPT

## 2021-10-11 ENCOUNTER — APPOINTMENT (OUTPATIENT)
Dept: PULMONOLOGY | Facility: CLINIC | Age: 71
End: 2021-10-11
Payer: MEDICARE

## 2021-10-11 VITALS
HEART RATE: 64 BPM | RESPIRATION RATE: 14 BRPM | SYSTOLIC BLOOD PRESSURE: 140 MMHG | DIASTOLIC BLOOD PRESSURE: 74 MMHG | OXYGEN SATURATION: 98 %

## 2021-10-11 PROCEDURE — 99214 OFFICE O/P EST MOD 30 MIN: CPT

## 2021-10-11 NOTE — HISTORY OF PRESENT ILLNESS
[Mild Persistent] : mild persistent [Doing Well] : doing well [Well Controlled] : Well controlled [None] : The patient is currently asymptomatic [Adherent] : the patient is adherent with ~his/her~ medication regimen [PFTs] : pulmonary function tests [de-identified] : s/p MVR 4/16/19, aspiration during colonoscopy 8/2020 and cleared

## 2021-10-11 NOTE — DISCUSSION/SUMMARY
[Asthma] : asthma [Stable] : stable [Responding to Treatment] : responding to treatment [Mild Persistent] : mild persistent [Patient] : the patient [None] : There are no changes in medication management

## 2021-10-11 NOTE — CONSULT LETTER
[Dear  ___] : Dear  [unfilled], [Consult Letter:] : I had the pleasure of evaluating your patient, [unfilled]. [Please see my note below.] : Please see my note below. [Sincerely,] : Sincerely, [FreeTextEntry3] : Prudencio Guardado MD FCCP\par Pulmonary/Critical Care/Sleep Medicine\par Department of Internal Medicine\par \par [facility] [employer]\par

## 2021-10-13 ENCOUNTER — APPOINTMENT (OUTPATIENT)
Dept: DERMATOLOGY | Facility: CLINIC | Age: 71
End: 2021-10-13

## 2021-10-21 ENCOUNTER — NON-APPOINTMENT (OUTPATIENT)
Age: 71
End: 2021-10-21

## 2021-10-21 ENCOUNTER — APPOINTMENT (OUTPATIENT)
Dept: CARDIOLOGY | Facility: CLINIC | Age: 71
End: 2021-10-21
Payer: MEDICARE

## 2021-10-21 VITALS
HEART RATE: 67 BPM | DIASTOLIC BLOOD PRESSURE: 68 MMHG | OXYGEN SATURATION: 96 % | WEIGHT: 140 LBS | RESPIRATION RATE: 16 BRPM | HEIGHT: 64 IN | SYSTOLIC BLOOD PRESSURE: 112 MMHG | BODY MASS INDEX: 23.9 KG/M2

## 2021-10-21 DIAGNOSIS — I47.2 VENTRICULAR TACHYCARDIA: ICD-10-CM

## 2021-10-21 PROCEDURE — 93000 ELECTROCARDIOGRAM COMPLETE: CPT

## 2021-10-21 PROCEDURE — 99214 OFFICE O/P EST MOD 30 MIN: CPT

## 2021-10-21 NOTE — ASSESSMENT
[FreeTextEntry1] : ECG: SR, septal Q waves, NSST\par \par  (5/2021)\par CBC/CMP/Mg unremarkable (5/2021)\par \par \par ECHO 6/2021:\par 1. Mildly increased septal wall thickness, EF 70-75%\par 2. Mild RVE with normal function\par 3. Severe LAE, mild KALPANA\par 4. Well seated bio MVR, DVI = 1.98, mean gradient 4.4mmHg, PHT 102msec\par 5. Moderate TR, RVSP 34mmHg\par \par ECHO 10/2020:\par 1. Sigmoid septum without obstruction\par 2. NOrmal LV function, EF 65-70%\par 3. Grade I diastolic dysfunction\par 4. Low normal RV function\par 5. Severe LAE, mild KALPANA\par 6. S/P MVR, mean gradient 4mmHg, PHT 144msec, trace regurgitaiton\par 7. MOderate TR, RVSP 35mmHg\par \par LHC (pre-op, 4/2019)\par 1. 30% mid LAD stenosis\par \par \par ECHO 11/2019:\par 1. Mild LVH, EF 60%\par 2. Mild RVE with normal systolic function\par 3. Severe LAE, normal RA\par 4. Mild aortic regurgitation\par 5. Well seated bio MVR, mean gradient 5mmHg, DVI = 1.4 \par 6. Moderate TR, RVSP 37mmHg

## 2021-10-21 NOTE — DISCUSSION/SUMMARY
[FreeTextEntry1] : Patient is a 70yo F with HLD, MVP with severe MR s/p bio MVR, KASIE ligation and MAZE 4/2019, mild CAD (30% LAD stenosis cath 2019) post op AF here for follow up. \par -Had one episode PAF during cardiac rehab fall 2019, none since. \par -Echo 6/2021 with well seated MVR, meand gradient 4.4mmHg, severe LAE, ,moderate TR, RVSP 34\par \par 1. CV stable, no med changes\par 2. Continue ASA/statin/zetia, has mild CAD.\par 3. Continue with regular exercise without limitation\par 4. Now off A/C, had KASIE ligation so continue ASA only. \par 5. Follow up 6 months, echo 1 month prior to eval MVR \par \par

## 2021-10-21 NOTE — HISTORY OF PRESENT ILLNESS
[FreeTextEntry1] : Patient is a 72yo F with HLD, MVP with severe MR s/p bio MVR, KASIE ligation and MAZE 4/2019, post op AF here for cardiac followup. Patient has been doing well without any chest pain or shortness of breath. Patient denies PND/orthopnea/edema/palpitations/syncope/claudication. Started seeing therapist after last OV and started feeling better. SLeeping beter. 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 Lives alone.  has been very difficult year for her. Immediately prior to pandemic mother passed\par \par ROS: GI and  negative

## 2021-10-25 ENCOUNTER — TRANSCRIPTION ENCOUNTER (OUTPATIENT)
Age: 71
End: 2021-10-25

## 2022-05-23 ENCOUNTER — TRANSCRIPTION ENCOUNTER (OUTPATIENT)
Age: 72
End: 2022-05-23

## 2022-06-06 ENCOUNTER — APPOINTMENT (OUTPATIENT)
Dept: UROGYNECOLOGY | Facility: CLINIC | Age: 72
End: 2022-06-06
Payer: MEDICARE

## 2022-06-06 DIAGNOSIS — N95.2 POSTMENOPAUSAL ATROPHIC VAGINITIS: ICD-10-CM

## 2022-06-06 PROCEDURE — 99212 OFFICE O/P EST SF 10 MIN: CPT

## 2022-06-06 NOTE — HISTORY OF PRESENT ILLNESS
[FreeTextEntry1] : \par Pt seen 9/13/21 for prolapse noted on GYN exam.  Pt was asymptomatic for prolapse at visit but noted to have severe atrophy.  She was started on low dose vaginal estrogen.  Today pt states she is doing well with this.  She denies any vaginal dryness.  She is not SA.  Denies any UTI symptoms today and has not been treated for any UTI since last visit.  She remains asymptomatic for prolapse.  Denies any incomplete emptying, frequency or urgency.  Denies any vaginal bleeding, no constipation.  Urethral polyp remains asymptomatic.  She is overall doing well.

## 2022-06-06 NOTE — DISCUSSION/SUMMARY
[FreeTextEntry1] : Pt to continue on low dose vaginal estrogen.  Risks/benefits discussed.  She will f/u in 1 year or sooner if needed.  She will continue care with primary GYN (due to see next week).  If GYN will rx vaginal estrogen, she will f/u here as needed.  Instructed to call with any questions or concerns and she verbalizes understanding.

## 2022-08-02 ENCOUNTER — APPOINTMENT (OUTPATIENT)
Dept: OBGYN | Facility: CLINIC | Age: 72
End: 2022-08-02

## 2022-08-02 VITALS
SYSTOLIC BLOOD PRESSURE: 122 MMHG | WEIGHT: 134 LBS | BODY MASS INDEX: 22.88 KG/M2 | DIASTOLIC BLOOD PRESSURE: 69 MMHG | HEIGHT: 64 IN

## 2022-08-02 DIAGNOSIS — R92.2 INCONCLUSIVE MAMMOGRAM: ICD-10-CM

## 2022-08-02 DIAGNOSIS — Z13.820 ENCOUNTER FOR SCREENING FOR OSTEOPOROSIS: ICD-10-CM

## 2022-08-02 DIAGNOSIS — Z12.39 ENCOUNTER FOR OTHER SCREENING FOR MALIGNANT NEOPLASM OF BREAST: ICD-10-CM

## 2022-08-02 DIAGNOSIS — Z12.4 ENCOUNTER FOR SCREENING FOR MALIGNANT NEOPLASM OF CERVIX: ICD-10-CM

## 2022-08-02 PROCEDURE — G0101: CPT

## 2022-08-02 NOTE — PLAN
[FreeTextEntry1] : The importance of an annual skin screening exam with dermatology and early detection and prevention of skin cancer was reviewed with the patient\par \par The importance of daily exercise calcium vitamin D and bone density every 2 years was reviewed with the patient\par \par Follow-up in 1 year or sooner as needed

## 2022-08-02 NOTE — HISTORY OF PRESENT ILLNESS
[LMP unknown] : LMP unknown [postmenopausal] : postmenopausal [N] : Patient denies prior pregnancies [unknown] : Patient is unsure of the date of her LMP [No] : Patient does not have concerns regarding sex [Previously active] : previously active [FreeTextEntry1] : 72-year-old G0 postmenopausal patient presents for annual GYN exam without complaints\par \par Patient had a colonoscopy last in 2020.  She denies any change in bowel habits and her last 2 colonoscopies have been negative.  \par \par  Her last Pap smear was normal July 2021 with negative high-risk HPV.\par \par Patient had a normal mammogram and screening breast ultrasound October 2021 BI-RADS 2 and she denies any lump on her self breast exam .\par \par Patient denies any postmenopausal bleeding and requests  annual Pap screening since she has a secondary insurance.\par \par Patient worked at [employer] for a long time but recently retired and spends half the year in [place] doing well.  Her back pain is still nonexistent after having back surgery and suffering for a long time.  She also has no changes from a cardiac perspective [Mammogramdate] : 10/01/21 [TextBox_19] : BR2 [BreastSonogramDate] : 10/01/21 [TextBox_25] : BR2 [PapSmeardate] : 07/06/21 [TextBox_31] : NEG [BoneDensityDate] : 08/07/20 [ColonoscopyDate] : 2020 [HPVDate] : 07/06/21 [TextBox_78] : NEG [PGHxTotal] : 0

## 2022-08-05 LAB — HPV HIGH+LOW RISK DNA PNL CVX: NOT DETECTED

## 2022-08-11 ENCOUNTER — APPOINTMENT (OUTPATIENT)
Dept: FAMILY MEDICINE | Facility: CLINIC | Age: 72
End: 2022-08-11

## 2022-08-11 ENCOUNTER — NON-APPOINTMENT (OUTPATIENT)
Age: 72
End: 2022-08-11

## 2022-08-11 ENCOUNTER — LABORATORY RESULT (OUTPATIENT)
Age: 72
End: 2022-08-11

## 2022-08-11 VITALS
HEART RATE: 69 BPM | BODY MASS INDEX: 22.53 KG/M2 | OXYGEN SATURATION: 95 % | SYSTOLIC BLOOD PRESSURE: 96 MMHG | DIASTOLIC BLOOD PRESSURE: 74 MMHG | WEIGHT: 132 LBS | HEIGHT: 64 IN

## 2022-08-11 DIAGNOSIS — I50.30 UNSPECIFIED DIASTOLIC (CONGESTIVE) HEART FAILURE: Chronic | ICD-10-CM

## 2022-08-11 DIAGNOSIS — R09.82 POSTNASAL DRIP: ICD-10-CM

## 2022-08-11 PROCEDURE — 36415 COLL VENOUS BLD VENIPUNCTURE: CPT

## 2022-08-11 PROCEDURE — 99214 OFFICE O/P EST MOD 30 MIN: CPT | Mod: 25

## 2022-08-11 PROCEDURE — G0439: CPT

## 2022-08-11 PROCEDURE — G0444 DEPRESSION SCREEN ANNUAL: CPT | Mod: 59

## 2022-08-11 RX ORDER — FLUTICASONE PROPIONATE 50 UG/1
50 SPRAY, METERED NASAL DAILY
Qty: 1 | Refills: 1 | Status: ACTIVE | COMMUNITY
Start: 2022-08-11 | End: 1900-01-01

## 2022-08-13 LAB — CYTOLOGY CVX/VAG DOC THIN PREP: NORMAL

## 2022-08-14 PROBLEM — I50.30 DIASTOLIC HEART FAILURE: Chronic | Status: ACTIVE | Noted: 2019-09-03

## 2022-08-14 PROBLEM — R09.82 POSTNASAL DRIP: Status: ACTIVE | Noted: 2022-08-11

## 2022-08-14 NOTE — HISTORY OF PRESENT ILLNESS
[FreeTextEntry8] : MS. BURKS IS A PLEASANT 71 YO PRESENTING FOR ACUTE VISIT.  HAS HAD ASTHMA FOR YEARS  NOW SINUSES BOTHERING HER.  SINUS PRESSURE 2 DAYS. POSTNASAL DRIP.  HOME COVID NEGATIVE LAST NIGHT.  NO FEVER, NO SORE THROAT.  HAS HAD NO N/V/D.  DENIES CHEST PAIN.  HAD WHEEZING AT NIGHT.  COUGH WORSE AT NIGHT SINCE MONDAY.  WENT TO URGENT CARE TUESDAY.  GIVEN STEROIDS.  NO SWAB DONE.  FEELING TIRED.  NOT USING NASAL SPRAY. [FreeTextEntry1] : WELLNESS EXAM [de-identified] : MS. BURKS IS A PLEASANT 73 YO PRESENTING FOR YEARLY WELLNESS EXAM.  CHRONIC HISTORY OF CAD, ASTHMA, HYPERCHOLESTEROLEMIA, OSTEOPENIA, MITRAL VALVE PROLAPSE WITH SEVERE MRI S/P BIO MVR, KASIE LIGATION AND MAZE APRIL 2019 WITH POST-OP ATRIAL FIBRILLATION. FOLLOWS ROUTINELY WITH CARDIOLOGY AND PULMONOLOGY.\par SEE SEPARATE SAME DAY ACUTE NOTE.\par \par DERMATOLOGY: DR. BAILEY

## 2022-08-14 NOTE — PLAN
[FreeTextEntry1] : VISION SCREENING\par SAFETY / HEALTHY HABITS REVIEWED\par HEALTHY DIET AND LIFESTYLE MODIFICATIONS\par VACCINATIONS DISCUSSED: FLU IN THE FALL\par CHECK ROUTINE LAB WORK\par FOLLOW-UP ALL SPECIALISTS AS DIRECTED\par CALL WITH ANY QUESTIONS, CONCERNS OR CHANGES

## 2022-08-14 NOTE — HISTORY OF PRESENT ILLNESS
[FreeTextEntry8] : MS. BURKS IS A PLEASANT 71 YO PRESENTING FOR ACUTE VISIT.  HAS HAD ASTHMA FOR YEARS  NOW SINUSES BOTHERING HER.  SINUS PRESSURE 2 DAYS. POSTNASAL DRIP.  HOME COVID NEGATIVE LAST NIGHT.  NO FEVER, NO SORE THROAT.  HAS HAD NO N/V/D.  DENIES CHEST PAIN.  HAD WHEEZING AT NIGHT.  COUGH WORSE AT NIGHT SINCE MONDAY.  WENT TO URGENT CARE TUESDAY.  GIVEN STEROIDS.  NO SWAB DONE.  FEELING TIRED.  NOT USING NASAL SPRAY. [FreeTextEntry1] : WELLNESS EXAM [de-identified] : MS. BURKS IS A PLEASANT 71 YO PRESENTING FOR YEARLY WELLNESS EXAM.  CHRONIC HISTORY OF CAD, ASTHMA, HYPERCHOLESTEROLEMIA, OSTEOPENIA, MITRAL VALVE PROLAPSE WITH SEVERE MRI S/P BIO MVR, KASIE LIGATION AND MAZE APRIL 2019 WITH POST-OP ATRIAL FIBRILLATION. FOLLOWS ROUTINELY WITH CARDIOLOGY AND PULMONOLOGY.\par SEE SEPARATE SAME DAY ACUTE NOTE.\par \par DERMATOLOGY: DR. BAILEY

## 2022-08-14 NOTE — HEALTH RISK ASSESSMENT
[Alone] : lives alone [Retired] : retired [College] : College [Single] : single [Very Good] : ~his/her~ current health as very good [Good] : ~his/her~  mood as  good [Never] : Never [Yes] : Yes [2 - 4 times a month (2 pts)] : 2-4 times a month (2 points) [1 or 2 (0 pts)] : 1 or 2 (0 points) [Never (0 pts)] : Never (0 points) [No] : In the past 12 months have you used drugs other than those required for medical reasons? No [No falls in past year] : Patient reported no falls in the past year [0] : 2) Feeling down, depressed, or hopeless: Not at all (0) [PHQ-2 Negative - No further assessment needed] : PHQ-2 Negative - No further assessment needed [HIV Test offered] : HIV Test offered [Hepatitis C test offered] : Hepatitis C test offered [None] : None [Feels Safe at Home] : Feels safe at home [Fully functional (bathing, dressing, toileting, transferring, walking, feeding)] : Fully functional (bathing, dressing, toileting, transferring, walking, feeding) [Fully functional (using the telephone, shopping, preparing meals, housekeeping, doing laundry, using] : Fully functional and needs no help or supervision to perform IADLs (using the telephone, shopping, preparing meals, housekeeping, doing laundry, using transportation, managing medications and managing finances) [Reports changes in hearing] : Reports changes in hearing [Reports normal functional visual acuity (ie: able to read med bottle)] : Reports normal functional visual acuity [Smoke Detector] : smoke detector [Carbon Monoxide Detector] : carbon monoxide detector [Safety elements used in home] : safety elements used in home [Seat Belt] :  uses seat belt [Sunscreen] : uses sunscreen [With Patient/Caregiver] : , with patient/caregiver [Designated Healthcare Proxy] : Designated healthcare proxy [Name: ___] : Health Care Proxy's Name: [unfilled]  [Relationship: ___] : Relationship: [unfilled] [Audit-CScore] : 2 [de-identified] : PICKLEBALL, YOGA, WALKING, GYM A LITTLE BIT [de-identified] : "GOOD" [[id number]] : 0 [Change in mental status noted] : No change in mental status noted [Language] : denies difficulty with language [Learning/Retaining New Information] : denies difficulty learning/retaining new information [Handling Complex Tasks] : denies difficulty handling complex tasks [Sexually Active] : not sexually active [High Risk Behavior] : no high risk behavior [Reports changes in vision] : Reports no changes in vision [Reports changes in dental health] : Reports no changes in dental health [MammogramDate] : 10/21 [PapSmearDate] : 08/22 [BoneDensityDate] : 08/20 [ColonoscopyDate] : 09/20 [BoneDensityComments] : ORDERED BY GYN [ColonoscopyComments] : FIVE YEAR FOLLOW-UP ADVISED [de-identified] : HAS GONE FOR AUDIOLOGY [de-identified] : GLASSES FOR DISTANCE AND READING [AdvancecareDate] : 08/22 [FreeTextEntry4] : Secondary Health Care Proxy: Brother: Diego Champagne

## 2022-08-14 NOTE — PHYSICAL EXAM
[No Acute Distress] : no acute distress [Well Nourished] : well nourished [Well-Appearing] : well-appearing [Normal Sclera/Conjunctiva] : normal sclera/conjunctiva [PERRL] : pupils equal round and reactive to light [Normal Outer Ear/Nose] : the outer ears and nose were normal in appearance [Normal Oropharynx] : the oropharynx was normal [Normal TMs] : both tympanic membranes were normal [No Lymphadenopathy] : no lymphadenopathy [Supple] : supple [No Respiratory Distress] : no respiratory distress  [Clear to Auscultation] : lungs were clear to auscultation bilaterally [No Accessory Muscle Use] : no accessory muscle use [Normal Rate] : normal rate  [Regular Rhythm] : with a regular rhythm [No Joint Swelling] : no joint swelling [Speech Grossly Normal] : speech grossly normal [Memory Grossly Normal] : memory grossly normal [Normal Mood] : the mood was normal [de-identified] : SEE ACUTE NOTE

## 2022-08-14 NOTE — REVIEW OF SYSTEMS
[Fatigue] : fatigue [Negative] : Psychiatric [Fever] : no fever [Chills] : no chills [FreeTextEntry4] : SEE HPI [FreeTextEntry6] : SEE HPI [FreeTextEntry2] : SEE ACUTE NOTE

## 2022-08-14 NOTE — PHYSICAL EXAM
[No Acute Distress] : no acute distress [Well Nourished] : well nourished [Well-Appearing] : well-appearing [Normal Sclera/Conjunctiva] : normal sclera/conjunctiva [PERRL] : pupils equal round and reactive to light [Normal Outer Ear/Nose] : the outer ears and nose were normal in appearance [Normal Oropharynx] : the oropharynx was normal [Normal TMs] : both tympanic membranes were normal [No Lymphadenopathy] : no lymphadenopathy [Supple] : supple [No Respiratory Distress] : no respiratory distress  [No Accessory Muscle Use] : no accessory muscle use [Clear to Auscultation] : lungs were clear to auscultation bilaterally [Normal Rate] : normal rate  [Regular Rhythm] : with a regular rhythm [No Joint Swelling] : no joint swelling [Speech Grossly Normal] : speech grossly normal [Memory Grossly Normal] : memory grossly normal [Normal Mood] : the mood was normal [de-identified] : SEE ACUTE NOTE

## 2022-08-14 NOTE — HEALTH RISK ASSESSMENT
[Alone] : lives alone [Retired] : retired [College] : College [Single] : single [Very Good] : ~his/her~ current health as very good [Good] : ~his/her~  mood as  good [Never] : Never [Yes] : Yes [2 - 4 times a month (2 pts)] : 2-4 times a month (2 points) [1 or 2 (0 pts)] : 1 or 2 (0 points) [Never (0 pts)] : Never (0 points) [No] : In the past 12 months have you used drugs other than those required for medical reasons? No [No falls in past year] : Patient reported no falls in the past year [0] : 2) Feeling down, depressed, or hopeless: Not at all (0) [PHQ-2 Negative - No further assessment needed] : PHQ-2 Negative - No further assessment needed [HIV Test offered] : HIV Test offered [Hepatitis C test offered] : Hepatitis C test offered [None] : None [Feels Safe at Home] : Feels safe at home [Fully functional (bathing, dressing, toileting, transferring, walking, feeding)] : Fully functional (bathing, dressing, toileting, transferring, walking, feeding) [Fully functional (using the telephone, shopping, preparing meals, housekeeping, doing laundry, using] : Fully functional and needs no help or supervision to perform IADLs (using the telephone, shopping, preparing meals, housekeeping, doing laundry, using transportation, managing medications and managing finances) [Reports changes in hearing] : Reports changes in hearing [Reports normal functional visual acuity (ie: able to read med bottle)] : Reports normal functional visual acuity [Smoke Detector] : smoke detector [Carbon Monoxide Detector] : carbon monoxide detector [Safety elements used in home] : safety elements used in home [Seat Belt] :  uses seat belt [Sunscreen] : uses sunscreen [With Patient/Caregiver] : , with patient/caregiver [Designated Healthcare Proxy] : Designated healthcare proxy [Name: ___] : Health Care Proxy's Name: [unfilled]  [Relationship: ___] : Relationship: [unfilled] [Audit-CScore] : 2 [de-identified] : PICKLEBALL, YOGA, WALKING, GYM A LITTLE BIT [de-identified] : "GOOD" [[id number]] : 0 [Change in mental status noted] : No change in mental status noted [Language] : denies difficulty with language [Learning/Retaining New Information] : denies difficulty learning/retaining new information [Handling Complex Tasks] : denies difficulty handling complex tasks [Sexually Active] : not sexually active [High Risk Behavior] : no high risk behavior [Reports changes in vision] : Reports no changes in vision [Reports changes in dental health] : Reports no changes in dental health [MammogramDate] : 10/21 [PapSmearDate] : 08/22 [BoneDensityDate] : 08/20 [ColonoscopyDate] : 09/20 [BoneDensityComments] : ORDERED BY GYN [ColonoscopyComments] : FIVE YEAR FOLLOW-UP ADVISED [de-identified] : HAS GONE FOR AUDIOLOGY [de-identified] : GLASSES FOR DISTANCE AND READING [AdvancecareDate] : 08/22 [FreeTextEntry4] : Secondary Health Care Proxy: Brother: Diego Champagne

## 2022-08-15 LAB
25(OH)D3 SERPL-MCNC: 53.6 NG/ML
ALBUMIN SERPL ELPH-MCNC: 4.4 G/DL
ALP BLD-CCNC: 101 U/L
ALT SERPL-CCNC: 22 U/L
ANION GAP SERPL CALC-SCNC: 11 MMOL/L
APPEARANCE: CLEAR
AST SERPL-CCNC: 30 U/L
BILIRUB SERPL-MCNC: 0.5 MG/DL
BILIRUBIN URINE: NEGATIVE
BLOOD URINE: NEGATIVE
BUN SERPL-MCNC: 11 MG/DL
CALCIUM SERPL-MCNC: 10.1 MG/DL
CHLORIDE SERPL-SCNC: 105 MMOL/L
CHOLEST SERPL-MCNC: 156 MG/DL
CO2 SERPL-SCNC: 22 MMOL/L
COLOR: YELLOW
CREAT SERPL-MCNC: 0.62 MG/DL
EGFR: 95 ML/MIN/1.73M2
ESTIMATED AVERAGE GLUCOSE: 114 MG/DL
GLUCOSE QUALITATIVE U: NEGATIVE
GLUCOSE SERPL-MCNC: 128 MG/DL
HBA1C MFR BLD HPLC: 5.6 %
HDLC SERPL-MCNC: 80 MG/DL
INFLUENZA A RESULT: NOT DETECTED
INFLUENZA B RESULT: NOT DETECTED
KETONES URINE: NEGATIVE
LDLC SERPL CALC-MCNC: 65 MG/DL
LEUKOCYTE ESTERASE URINE: NEGATIVE
NITRITE URINE: NEGATIVE
NONHDLC SERPL-MCNC: 76 MG/DL
PH URINE: 6
POTASSIUM SERPL-SCNC: 4.7 MMOL/L
PROT SERPL-MCNC: 8.2 G/DL
PROTEIN URINE: ABNORMAL
RESP SYN VIRUS RESULT: NOT DETECTED
SARS-COV-2 RESULT: NOT DETECTED
SODIUM SERPL-SCNC: 138 MMOL/L
SPECIFIC GRAVITY URINE: 1.03
T4 FREE SERPL-MCNC: 1.3 NG/DL
TRIGL SERPL-MCNC: 53 MG/DL
TSH SERPL-ACNC: 1.42 UIU/ML
UROBILINOGEN URINE: NORMAL

## 2022-08-16 LAB
BASOPHILS # BLD AUTO: 0.04 K/UL
BASOPHILS NFR BLD AUTO: 0.6 %
EOSINOPHIL # BLD AUTO: 0.02 K/UL
EOSINOPHIL NFR BLD AUTO: 0.3 %
HCT VFR BLD CALC: 46.2 %
HGB BLD-MCNC: 14.1 G/DL
IMM GRANULOCYTES NFR BLD AUTO: 0.3 %
LYMPHOCYTES # BLD AUTO: 1.7 K/UL
LYMPHOCYTES NFR BLD AUTO: 23.5 %
MAN DIFF?: NORMAL
MCHC RBC-ENTMCNC: 28.5 PG
MCHC RBC-ENTMCNC: 30.5 GM/DL
MCV RBC AUTO: 93.5 FL
MONOCYTES # BLD AUTO: 0.46 K/UL
MONOCYTES NFR BLD AUTO: 6.4 %
NEUTROPHILS # BLD AUTO: 4.99 K/UL
NEUTROPHILS NFR BLD AUTO: 68.9 %
PLATELET # BLD AUTO: 231 K/UL
RBC # BLD: 4.94 M/UL
RBC # FLD: 13.6 %
WBC # FLD AUTO: 7.23 K/UL

## 2022-08-17 LAB — HEMOCCULT STL QL IA: NEGATIVE

## 2022-08-20 ENCOUNTER — TRANSCRIPTION ENCOUNTER (OUTPATIENT)
Age: 72
End: 2022-08-20

## 2022-08-20 ENCOUNTER — NON-APPOINTMENT (OUTPATIENT)
Age: 72
End: 2022-08-20

## 2022-08-20 DIAGNOSIS — R80.9 PROTEINURIA, UNSPECIFIED: ICD-10-CM

## 2022-08-29 ENCOUNTER — TRANSCRIPTION ENCOUNTER (OUTPATIENT)
Age: 72
End: 2022-08-29

## 2022-09-07 ENCOUNTER — TRANSCRIPTION ENCOUNTER (OUTPATIENT)
Age: 72
End: 2022-09-07

## 2022-09-14 ENCOUNTER — APPOINTMENT (OUTPATIENT)
Dept: FAMILY MEDICINE | Facility: CLINIC | Age: 72
End: 2022-09-14

## 2022-09-26 ENCOUNTER — APPOINTMENT (OUTPATIENT)
Dept: CARDIOLOGY | Facility: CLINIC | Age: 72
End: 2022-09-26

## 2022-09-26 PROCEDURE — 93306 TTE W/DOPPLER COMPLETE: CPT

## 2022-10-03 ENCOUNTER — APPOINTMENT (OUTPATIENT)
Dept: ULTRASOUND IMAGING | Facility: CLINIC | Age: 72
End: 2022-10-03

## 2022-10-03 ENCOUNTER — RESULT REVIEW (OUTPATIENT)
Age: 72
End: 2022-10-03

## 2022-10-03 ENCOUNTER — APPOINTMENT (OUTPATIENT)
Dept: RADIOLOGY | Facility: CLINIC | Age: 72
End: 2022-10-03

## 2022-10-03 ENCOUNTER — OUTPATIENT (OUTPATIENT)
Dept: OUTPATIENT SERVICES | Facility: HOSPITAL | Age: 72
LOS: 1 days | End: 2022-10-03
Payer: MEDICARE

## 2022-10-03 ENCOUNTER — APPOINTMENT (OUTPATIENT)
Dept: MAMMOGRAPHY | Facility: CLINIC | Age: 72
End: 2022-10-03

## 2022-10-03 DIAGNOSIS — H26.9 UNSPECIFIED CATARACT: Chronic | ICD-10-CM

## 2022-10-03 DIAGNOSIS — R92.8 OTHER ABNORMAL AND INCONCLUSIVE FINDINGS ON DIAGNOSTIC IMAGING OF BREAST: ICD-10-CM

## 2022-10-03 DIAGNOSIS — Z98.890 OTHER SPECIFIED POSTPROCEDURAL STATES: Chronic | ICD-10-CM

## 2022-10-03 DIAGNOSIS — Z13.820 ENCOUNTER FOR SCREENING FOR OSTEOPOROSIS: ICD-10-CM

## 2022-10-03 DIAGNOSIS — D24.2 BENIGN NEOPLASM OF LEFT BREAST: Chronic | ICD-10-CM

## 2022-10-03 PROCEDURE — 77067 SCR MAMMO BI INCL CAD: CPT | Mod: 26

## 2022-10-03 PROCEDURE — 77063 BREAST TOMOSYNTHESIS BI: CPT

## 2022-10-03 PROCEDURE — 77063 BREAST TOMOSYNTHESIS BI: CPT | Mod: 26

## 2022-10-03 PROCEDURE — 76641 ULTRASOUND BREAST COMPLETE: CPT

## 2022-10-03 PROCEDURE — 77085 DXA BONE DENSITY AXL VRT FX: CPT | Mod: 26

## 2022-10-03 PROCEDURE — 76641 ULTRASOUND BREAST COMPLETE: CPT | Mod: 26,50

## 2022-10-03 PROCEDURE — 77085 DXA BONE DENSITY AXL VRT FX: CPT

## 2022-10-03 PROCEDURE — 77067 SCR MAMMO BI INCL CAD: CPT

## 2022-10-04 ENCOUNTER — APPOINTMENT (OUTPATIENT)
Dept: PULMONOLOGY | Facility: CLINIC | Age: 72
End: 2022-10-04

## 2022-10-04 VITALS
DIASTOLIC BLOOD PRESSURE: 70 MMHG | SYSTOLIC BLOOD PRESSURE: 130 MMHG | BODY MASS INDEX: 23.17 KG/M2 | WEIGHT: 135 LBS | HEART RATE: 69 BPM | OXYGEN SATURATION: 96 % | RESPIRATION RATE: 16 BRPM

## 2022-10-04 PROCEDURE — 99213 OFFICE O/P EST LOW 20 MIN: CPT

## 2022-10-04 RX ORDER — BENZONATATE 100 MG/1
100 CAPSULE ORAL 3 TIMES DAILY
Qty: 21 | Refills: 0 | Status: COMPLETED | COMMUNITY
Start: 2022-08-11 | End: 2022-10-04

## 2022-10-04 RX ORDER — MOMETASONE FUROATE 1 MG/ML
0.1 SOLUTION TOPICAL
Qty: 60 | Refills: 0 | Status: COMPLETED | COMMUNITY
Start: 2021-10-05 | End: 2022-10-04

## 2022-10-04 RX ORDER — PREDNISONE 20 MG/1
20 TABLET ORAL
Qty: 5 | Refills: 0 | Status: COMPLETED | COMMUNITY
Start: 2022-08-09 | End: 2022-10-04

## 2022-10-04 NOTE — DISCUSSION/SUMMARY
[Asthma] : asthma [Stable] : stable [Responding to Treatment] : responding to treatment [Mild Persistent] : mild persistent [None] : There are no changes in medication management [Patient] : the patient

## 2022-10-04 NOTE — HISTORY OF PRESENT ILLNESS
[Mild Persistent] : mild persistent [Doing Well] : doing well [Well Controlled] : Well controlled [None] : The patient is currently asymptomatic [Adherent] : the patient is adherent with ~his/her~ medication regimen [PFTs] : pulmonary function tests [de-identified] : s/p MVR 4/16/19 [de-identified] : exacerbation August rxed pred x 5 days

## 2022-10-04 NOTE — CONSULT LETTER
[Dear  ___] : Dear  [unfilled], [Consult Letter:] : I had the pleasure of evaluating your patient, [unfilled]. [Please see my note below.] : Please see my note below. [Sincerely,] : Sincerely, [FreeTextEntry3] : Prudencio Guardado MD FCCP\par Pulmonary/Critical Care/Sleep Medicine\par Department of Internal Medicine\par \par [facility] [employer]\par

## 2022-10-06 ENCOUNTER — NON-APPOINTMENT (OUTPATIENT)
Age: 72
End: 2022-10-06

## 2022-10-06 ENCOUNTER — APPOINTMENT (OUTPATIENT)
Dept: CARDIOLOGY | Facility: CLINIC | Age: 72
End: 2022-10-06

## 2022-10-06 VITALS
SYSTOLIC BLOOD PRESSURE: 104 MMHG | HEART RATE: 68 BPM | HEIGHT: 64 IN | RESPIRATION RATE: 16 BRPM | OXYGEN SATURATION: 93 % | DIASTOLIC BLOOD PRESSURE: 69 MMHG

## 2022-10-06 PROCEDURE — 93000 ELECTROCARDIOGRAM COMPLETE: CPT

## 2022-10-06 PROCEDURE — 99214 OFFICE O/P EST MOD 30 MIN: CPT

## 2022-10-06 NOTE — HISTORY OF PRESENT ILLNESS
[FreeTextEntry1] : Patient is a 72yo F with HLD, MVP with severe MR s/p bio MVR, KASIE ligation and MAZE 4/2019, post op AF here for cardiac followup. Patient has been doing well without any chest pain or shortness of breath. Patient denies PND/orthopnea/edema/palpitations/syncope/claudication. Started seeing therapist last year and started feeling better. \par \par States had asthma exacerbation then sinusitis in early august. 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 Lives alone.  has been very difficult year for her. Immediately prior to pandemic mother passed\par \par ROS: GI and  negative

## 2022-10-06 NOTE — DISCUSSION/SUMMARY
[FreeTextEntry1] : Patient is a 70yo F with HLD, MVP with severe MR s/p bio MVR, KASIE ligation and MAZE 4/2019, mild CAD (30% LAD stenosis cath 2019) post op AF here for follow up. \par -Had one episode PAF during cardiac rehab fall 2019, none since. \par -Echo 9/2022 with well seated MVR, mean gradient 3.7mmHg, severe LAE, ,moderate TR, RVSP 32, mild RVE and low normal RV function\par -FEeling well, no symptoms at this time\par \par 1. CV stable, no med changes\par 2. Continue ASA/statin/zetia, has mild CAD.\par 3. Continue with regular exercise without limitation\par 4. Now off A/C, had KASIE ligation so continue ASA only. \par 5. Follow up 1 year, echo then to re-evaluate MVR and RV function\par 6. Will be away in [place] till May, sees cardio there as well \par

## 2022-10-06 NOTE — ASSESSMENT
[FreeTextEntry1] : ECG: SR, septal Q waves, leftward axis\par \par  (5/2021)\par CBC/CMP/Mg unremarkable (5/2021)\par \par ECHO 9/2022:\par 1. Normal LV size and function, EF 60-65%\par 2. Grade II diastolic dysfunction \par 3. Mild RVE, low normal function\par 4. Severe LAE, boderline KALPANA\par 5. Well seated MVR, mean gradient 3.6mmHg, PHT 128msec and mild MR\par 6. Moderate TR, RVSP 32mmHg\par \par ECHO 6/2021:\par 1. Mildly increased septal wall thickness, EF 70-75%\par 2. Mild RVE with normal function\par 3. Severe LAE, mild KALPANA\par 4. Well seated bio MVR, DVI = 1.98, mean gradient 4.4mmHg, PHT 102msec\par 5. Moderate TR, RVSP 34mmHg\par \par ECHO 10/2020:\par 1. Sigmoid septum without obstruction\par 2. NOrmal LV function, EF 65-70%\par 3. Grade I diastolic dysfunction\par 4. Low normal RV function\par 5. Severe LAE, mild KALPANA\par 6. S/P MVR, mean gradient 4mmHg, PHT 144msec, trace regurgitaiton\par 7. MOderate TR, RVSP 35mmHg\par \par LHC (pre-op, 4/2019)\par 1. 30% mid LAD stenosis\par \par \par ECHO 11/2019:\par 1. Mild LVH, EF 60%\par 2. Mild RVE with normal systolic function\par 3. Severe LAE, normal RA\par 4. Mild aortic regurgitation\par 5. Well seated bio MVR, mean gradient 5mmHg, DVI = 1.4 \par 6. Moderate TR, RVSP 37mmHg

## 2022-10-25 ENCOUNTER — APPOINTMENT (OUTPATIENT)
Dept: DERMATOLOGY | Facility: CLINIC | Age: 72
End: 2022-10-25

## 2022-10-25 PROCEDURE — 99213 OFFICE O/P EST LOW 20 MIN: CPT

## 2022-11-07 ENCOUNTER — TRANSCRIPTION ENCOUNTER (OUTPATIENT)
Age: 72
End: 2022-11-07

## 2022-12-02 ENCOUNTER — TRANSCRIPTION ENCOUNTER (OUTPATIENT)
Age: 72
End: 2022-12-02

## 2022-12-06 ENCOUNTER — TRANSCRIPTION ENCOUNTER (OUTPATIENT)
Age: 72
End: 2022-12-06

## 2023-04-19 ENCOUNTER — RX RENEWAL (OUTPATIENT)
Age: 73
End: 2023-04-19

## 2023-04-21 NOTE — DISCHARGE NOTE NURSING/CASE MANAGEMENT/SOCIAL WORK - NSDCPEPTCOWAFU_GEN_ALL_CORE
Fall Go for blood test as directed. Because your dose is based on the INR blood test it is very important that you get your blood tested on the date and time that you are scheduled and keep your healthcare providers appointments.   Please follow up with your doctor within 3 days of discharge to schedule your next blood test.

## 2023-04-26 ENCOUNTER — RX RENEWAL (OUTPATIENT)
Age: 73
End: 2023-04-26

## 2023-04-29 ENCOUNTER — TRANSCRIPTION ENCOUNTER (OUTPATIENT)
Age: 73
End: 2023-04-29

## 2023-05-31 ENCOUNTER — RX RENEWAL (OUTPATIENT)
Age: 73
End: 2023-05-31

## 2023-05-31 ENCOUNTER — TRANSCRIPTION ENCOUNTER (OUTPATIENT)
Age: 73
End: 2023-05-31

## 2023-05-31 RX ORDER — ESTRADIOL 0.1 MG/G
0.1 CREAM VAGINAL
Qty: 42.5 | Refills: 0 | Status: ACTIVE | COMMUNITY
Start: 2021-09-22 | End: 1900-01-01

## 2023-06-01 ENCOUNTER — TRANSCRIPTION ENCOUNTER (OUTPATIENT)
Age: 73
End: 2023-06-01

## 2023-06-05 RX ORDER — METHYLPREDNISOLONE 4 MG/1
4 TABLET ORAL
Qty: 1 | Refills: 0 | Status: DISCONTINUED | COMMUNITY
Start: 2017-01-24 | End: 2017-03-08

## 2023-07-27 ENCOUNTER — RX RENEWAL (OUTPATIENT)
Age: 73
End: 2023-07-27

## 2023-08-23 ENCOUNTER — NON-APPOINTMENT (OUTPATIENT)
Age: 73
End: 2023-08-23

## 2023-08-30 ENCOUNTER — NON-APPOINTMENT (OUTPATIENT)
Age: 73
End: 2023-08-30

## 2023-09-01 ENCOUNTER — NON-APPOINTMENT (OUTPATIENT)
Age: 73
End: 2023-09-01

## 2023-09-01 ENCOUNTER — EMERGENCY (EMERGENCY)
Facility: HOSPITAL | Age: 73
LOS: 1 days | End: 2023-09-01
Attending: EMERGENCY MEDICINE
Payer: MEDICARE

## 2023-09-01 VITALS
HEIGHT: 64 IN | WEIGHT: 137.13 LBS | HEART RATE: 79 BPM | TEMPERATURE: 98 F | RESPIRATION RATE: 18 BRPM | SYSTOLIC BLOOD PRESSURE: 115 MMHG | OXYGEN SATURATION: 98 % | DIASTOLIC BLOOD PRESSURE: 72 MMHG

## 2023-09-01 DIAGNOSIS — D24.2 BENIGN NEOPLASM OF LEFT BREAST: Chronic | ICD-10-CM

## 2023-09-01 DIAGNOSIS — H26.9 UNSPECIFIED CATARACT: Chronic | ICD-10-CM

## 2023-09-01 DIAGNOSIS — Z98.890 OTHER SPECIFIED POSTPROCEDURAL STATES: Chronic | ICD-10-CM

## 2023-09-01 LAB
ALBUMIN SERPL ELPH-MCNC: 3.9 G/DL — SIGNIFICANT CHANGE UP (ref 3.3–5.2)
ALP SERPL-CCNC: 93 U/L — SIGNIFICANT CHANGE UP (ref 40–120)
ALT FLD-CCNC: 19 U/L — SIGNIFICANT CHANGE UP
ANION GAP SERPL CALC-SCNC: 12 MMOL/L — SIGNIFICANT CHANGE UP (ref 5–17)
AST SERPL-CCNC: 24 U/L — SIGNIFICANT CHANGE UP
BASOPHILS # BLD AUTO: 0.06 K/UL — SIGNIFICANT CHANGE UP (ref 0–0.2)
BASOPHILS NFR BLD AUTO: 0.6 % — SIGNIFICANT CHANGE UP (ref 0–2)
BILIRUB SERPL-MCNC: 0.2 MG/DL — LOW (ref 0.4–2)
BUN SERPL-MCNC: 14.9 MG/DL — SIGNIFICANT CHANGE UP (ref 8–20)
CALCIUM SERPL-MCNC: 9.6 MG/DL — SIGNIFICANT CHANGE UP (ref 8.4–10.5)
CHLORIDE SERPL-SCNC: 101 MMOL/L — SIGNIFICANT CHANGE UP (ref 96–108)
CO2 SERPL-SCNC: 23 MMOL/L — SIGNIFICANT CHANGE UP (ref 22–29)
CREAT SERPL-MCNC: 0.64 MG/DL — SIGNIFICANT CHANGE UP (ref 0.5–1.3)
EGFR: 93 ML/MIN/1.73M2 — SIGNIFICANT CHANGE UP
EOSINOPHIL # BLD AUTO: 0.22 K/UL — SIGNIFICANT CHANGE UP (ref 0–0.5)
EOSINOPHIL NFR BLD AUTO: 2.2 % — SIGNIFICANT CHANGE UP (ref 0–6)
GLUCOSE SERPL-MCNC: 124 MG/DL — HIGH (ref 70–99)
HCT VFR BLD CALC: 43.3 % — SIGNIFICANT CHANGE UP (ref 34.5–45)
HGB BLD-MCNC: 14.6 G/DL — SIGNIFICANT CHANGE UP (ref 11.5–15.5)
IMM GRANULOCYTES NFR BLD AUTO: 0.9 % — SIGNIFICANT CHANGE UP (ref 0–0.9)
LYMPHOCYTES # BLD AUTO: 2.18 K/UL — SIGNIFICANT CHANGE UP (ref 1–3.3)
LYMPHOCYTES # BLD AUTO: 21.7 % — SIGNIFICANT CHANGE UP (ref 13–44)
MCHC RBC-ENTMCNC: 29.3 PG — SIGNIFICANT CHANGE UP (ref 27–34)
MCHC RBC-ENTMCNC: 33.7 GM/DL — SIGNIFICANT CHANGE UP (ref 32–36)
MCV RBC AUTO: 86.8 FL — SIGNIFICANT CHANGE UP (ref 80–100)
MONOCYTES # BLD AUTO: 1.04 K/UL — HIGH (ref 0–0.9)
MONOCYTES NFR BLD AUTO: 10.4 % — SIGNIFICANT CHANGE UP (ref 2–14)
NEUTROPHILS # BLD AUTO: 6.44 K/UL — SIGNIFICANT CHANGE UP (ref 1.8–7.4)
NEUTROPHILS NFR BLD AUTO: 64.2 % — SIGNIFICANT CHANGE UP (ref 43–77)
PLATELET # BLD AUTO: 281 K/UL — SIGNIFICANT CHANGE UP (ref 150–400)
POTASSIUM SERPL-MCNC: 4.3 MMOL/L — SIGNIFICANT CHANGE UP (ref 3.5–5.3)
POTASSIUM SERPL-SCNC: 4.3 MMOL/L — SIGNIFICANT CHANGE UP (ref 3.5–5.3)
PROT SERPL-MCNC: 8.5 G/DL — SIGNIFICANT CHANGE UP (ref 6.6–8.7)
RBC # BLD: 4.99 M/UL — SIGNIFICANT CHANGE UP (ref 3.8–5.2)
RBC # FLD: 12.6 % — SIGNIFICANT CHANGE UP (ref 10.3–14.5)
SODIUM SERPL-SCNC: 136 MMOL/L — SIGNIFICANT CHANGE UP (ref 135–145)
WBC # BLD: 10.03 K/UL — SIGNIFICANT CHANGE UP (ref 3.8–10.5)
WBC # FLD AUTO: 10.03 K/UL — SIGNIFICANT CHANGE UP (ref 3.8–10.5)

## 2023-09-01 PROCEDURE — 85025 COMPLETE CBC W/AUTO DIFF WBC: CPT

## 2023-09-01 PROCEDURE — 70487 CT MAXILLOFACIAL W/DYE: CPT | Mod: MG

## 2023-09-01 PROCEDURE — 99285 EMERGENCY DEPT VISIT HI MDM: CPT | Mod: FS

## 2023-09-01 PROCEDURE — 87040 BLOOD CULTURE FOR BACTERIA: CPT

## 2023-09-01 PROCEDURE — 96374 THER/PROPH/DIAG INJ IV PUSH: CPT | Mod: XU

## 2023-09-01 PROCEDURE — 99284 EMERGENCY DEPT VISIT MOD MDM: CPT | Mod: 25

## 2023-09-01 PROCEDURE — 70487 CT MAXILLOFACIAL W/DYE: CPT | Mod: 26,MG

## 2023-09-01 PROCEDURE — G1004: CPT

## 2023-09-01 PROCEDURE — 80053 COMPREHEN METABOLIC PANEL: CPT

## 2023-09-01 PROCEDURE — 36415 COLL VENOUS BLD VENIPUNCTURE: CPT

## 2023-09-01 RX ORDER — AMPICILLIN SODIUM AND SULBACTAM SODIUM 250; 125 MG/ML; MG/ML
3 INJECTION, POWDER, FOR SUSPENSION INTRAMUSCULAR; INTRAVENOUS ONCE
Refills: 0 | Status: COMPLETED | OUTPATIENT
Start: 2023-09-01 | End: 2023-09-01

## 2023-09-01 RX ORDER — MUPIROCIN 20 MG/G
1 OINTMENT TOPICAL
Qty: 1 | Refills: 0
Start: 2023-09-01 | End: 2023-09-07

## 2023-09-01 RX ADMIN — AMPICILLIN SODIUM AND SULBACTAM SODIUM 200 GRAM(S): 250; 125 INJECTION, POWDER, FOR SUSPENSION INTRAMUSCULAR; INTRAVENOUS at 06:13

## 2023-09-01 NOTE — ED PROVIDER NOTE - ATTENDING APP SHARED VISIT CONTRIBUTION OF CARE
I performed a history and physical exam of the patient and discussed their management with the advanced care provider. I reviewed the advanced care provider's note and agree with the documented findings and plan of care. My medical decision making and objective findings are found below.     73-year-old female past medical history of hypertension, hyperlipidemia presents to the ED with rash to face.  Patient states that she had a fever yesterday associated with sinus pain, started on Augmentin.  She states that today her rash is gotten worse, however pain is felt bilateral.  Normal temp today.  On exam, patient with malar rash, no crepitus.  EOMI without pain or entrapment.  Plan to obtain labs, CT max face to rule out abscess, IV antibiotics, and reassess.

## 2023-09-01 NOTE — ED PROVIDER NOTE - PHYSICAL EXAMINATION
General: non-toxic appearing, in no acute distress, A+Ox3  HENT: normocephalic. EAC without erythema, TMs translucent, hearing intact. Nasal mucosa non-inflamed, septum and turbinates normal. Mucous membranes moist, no gingival inflammation, no tonsillar hypertrophy or exudates, uvula midline. Neck supple without bruits or adenopathy   heent: EOMI  CV: RRR, nl s1/s2.  Resp: CTAB, normal rate and effort  Skin: malar facial rash well-demarcated

## 2023-09-01 NOTE — ED ADULT NURSE NOTE - NSFALLUNIVINTERV_ED_ALL_ED
----- Message from Daisy Alex MD sent at 1/2/2019  4:26 PM CST -----  Labs and MRI are normal.  ----- Message -----  From: Vic Palma MA  Sent: 1/2/2019   1:52 PM  To: MD Julian Harper calling for test results.   ----- Message -----  From: Kiara Paula  Sent: 1/2/2019  12:56 PM  To: [facility] Staff    Test Results    Type of Test:MRI [facility]   Date of Test:12-17-18  Communication Preference:mom [phone]  Additional Information:      
Mom informed and verbalized understanding.   
Bed/Stretcher in lowest position, wheels locked, appropriate side rails in place/Call bell, personal items and telephone in reach/Instruct patient to call for assistance before getting out of bed/chair/stretcher/Non-slip footwear applied when patient is off stretcher/[place] to call system/Physically safe environment - no spills, clutter or unnecessary equipment/Purposeful proactive rounding/Room/bathroom lighting operational, light cord in reach

## 2023-09-01 NOTE — ED PROVIDER NOTE - PATIENT PORTAL LINK FT
You can access the FollowMyHealth Patient Portal offered by [facility] by registering at the following website: [email or web address]. By joining [facility]’s FollowMyHealth portal, you will also be able to view your health information using other applications (apps) compatible with our system.

## 2023-09-01 NOTE — ED ADULT TRIAGE NOTE - CHIEF COMPLAINT QUOTE
pt states she has facial swelling, red rash noted to center of face, started yesterday  A&Ox3, resp wnl, went to urgent care yesterday was given Augmenten, had 101.7 fever yesterday

## 2023-09-01 NOTE — ED ADULT NURSE NOTE - OBJECTIVE STATEMENT
pt to ED with complaints of facial rash. [facility] mitral valve repair 2019. pt states she went on a trip out of the country and returned with runny nose and generalized not feeling well. pt states she felt she had covid so she went to  and was tested which was negative, but was prescribed augmentin for sinusitis. pt states prior to starting the augmentin she developed a slight red discoloration rash on her cheeks and nose, nontender. pt states she took two doses of augmentin and now presents with redness, swelling and tenderness for nose and b/l cheeks. pt reports fevers, tmax 101.7. denies any vision changes, sob, headache. pt denies use of new soaps, detergents, food.

## 2023-09-01 NOTE — ED ADULT NURSE REASSESSMENT NOTE - NS ED NURSE REASSESS COMMENT FT1
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 Pt a&ox3, pt note with facial rash, IV abx completed as change of shift. pt transported to CT via wheel chair. safety maintained.

## 2023-09-01 NOTE — ED PROVIDER NOTE - OBJECTIVE STATEMENT
74yo F pmh HTN, HLD, asthma presents to ED c/o facial rash x1d. pt reports being dx with sinusitis and URI by UC yesterday and started on Augmentin. pt reports has rash before starting drug but rash has progressively gotten worse. oterwise pt reports URI sx have improved. reports some tenderness along facial rash. has fever this week, yesterday temp was 100.7, normal temp today. pt denies ever having similar rash. denies rash any where else, new foods/lotions, pruritis, abdominal pain, change in vision, pain during EOM.

## 2023-09-02 ENCOUNTER — APPOINTMENT (OUTPATIENT)
Dept: OBGYN | Facility: CLINIC | Age: 73
End: 2023-09-02
Payer: MEDICARE

## 2023-09-02 VITALS
SYSTOLIC BLOOD PRESSURE: 102 MMHG | DIASTOLIC BLOOD PRESSURE: 70 MMHG | BODY MASS INDEX: 23.22 KG/M2 | WEIGHT: 136 LBS | HEIGHT: 64 IN

## 2023-09-02 DIAGNOSIS — Z12.39 ENCOUNTER FOR OTHER SCREENING FOR MALIGNANT NEOPLASM OF BREAST: ICD-10-CM

## 2023-09-02 DIAGNOSIS — R92.30 DENSE BREASTS, UNSPECIFIED: ICD-10-CM

## 2023-09-02 DIAGNOSIS — Z01.419 ENCOUNTER FOR GYNECOLOGICAL EXAMINATION (GENERAL) (ROUTINE) W/OUT ABNORMAL FINDINGS: ICD-10-CM

## 2023-09-02 DIAGNOSIS — Z12.4 ENCOUNTER FOR SCREENING FOR MALIGNANT NEOPLASM OF CERVIX: ICD-10-CM

## 2023-09-02 PROCEDURE — G0101: CPT

## 2023-09-02 PROCEDURE — 99397 PER PM REEVAL EST PAT 65+ YR: CPT | Mod: GY

## 2023-09-02 NOTE — HISTORY OF PRESENT ILLNESS
[LMP unknown] : LMP unknown [N] : Patient denies prior pregnancies [Mammogramdate] : 10/03/22 [TextBox_19] : BR2 [BreastSonogramDate] : 10/03/22 [PapSmeardate] : 08/02/22 [TextBox_25] : BR2 [TextBox_31] : NEG [ColonoscopyDate] : 09/2020 [HPVDate] : 08/02/22 [TextBox_78] : NEG [PGHxTotal] : 0 [unknown] : Patient is unsure of the date of her LMP [No] : Patient does not have concerns regarding sex

## 2023-09-05 ENCOUNTER — OFFICE (OUTPATIENT)
Dept: URBAN - METROPOLITAN AREA CLINIC 6 | Facility: CLINIC | Age: 73
Setting detail: OPHTHALMOLOGY
End: 2023-09-05
Payer: MEDICARE

## 2023-09-05 DIAGNOSIS — Z96.1: ICD-10-CM

## 2023-09-05 DIAGNOSIS — H02.834: ICD-10-CM

## 2023-09-05 DIAGNOSIS — H16.223: ICD-10-CM

## 2023-09-05 DIAGNOSIS — H35.372: ICD-10-CM

## 2023-09-05 DIAGNOSIS — H11.152: ICD-10-CM

## 2023-09-05 DIAGNOSIS — H02.831: ICD-10-CM

## 2023-09-05 DIAGNOSIS — H43.393: ICD-10-CM

## 2023-09-05 PROCEDURE — 92014 COMPRE OPH EXAM EST PT 1/>: CPT | Performed by: OPHTHALMOLOGY

## 2023-09-05 PROCEDURE — 92134 CPTRZ OPH DX IMG PST SGM RTA: CPT | Performed by: OPHTHALMOLOGY

## 2023-09-05 ASSESSMENT — REFRACTION_MANIFEST
OS_VA1: 20/20-2
OS_AXIS: 080
OS_CYLINDER: -1.25
OS_SPHERE: +0.25
OD_SPHERE: 0.00
OU_VA: 20/20
OD_CYLINDER: -0.75
OD_VA1: 20/20-1
OD_AXIS: 075

## 2023-09-05 ASSESSMENT — KERATOMETRY
OD_K1POWER_DIOPTERS: 43.00
OS_AXISANGLE_DEGREES: 167
OS_K2POWER_DIOPTERS: 44.00
OS_K1POWER_DIOPTERS: 43.00
METHOD_AUTO_MANUAL: AUTO
OD_AXISANGLE_DEGREES: 178
OD_K2POWER_DIOPTERS: 43.50

## 2023-09-05 ASSESSMENT — REFRACTION_CURRENTRX
OD_AXIS: 062
OS_VPRISM_DIRECTION: PROGS
OD_ADD: +2.75
OD_SPHERE: PLANO
OS_CYLINDER: -0.50
OS_AXIS: 077
OS_ADD: +2.75
OD_OVR_VA: 20/
OS_SPHERE: PLANO
OS_OVR_VA: 20/
OD_VPRISM_DIRECTION: PROGS
OD_CYLINDER: -0.50

## 2023-09-05 ASSESSMENT — VISUAL ACUITY
OD_BCVA: 20/20
OS_BCVA: 20/20

## 2023-09-05 ASSESSMENT — SUPERFICIAL PUNCTATE KERATITIS (SPK)
OD_SPK: T
OS_SPK: T

## 2023-09-05 ASSESSMENT — TONOMETRY
OS_IOP_MMHG: 14
OD_IOP_MMHG: 13

## 2023-09-05 ASSESSMENT — CONFRONTATIONAL VISUAL FIELD TEST (CVF)
OS_FINDINGS: FULL
OD_FINDINGS: FULL

## 2023-09-05 ASSESSMENT — REFRACTION_AUTOREFRACTION
OD_SPHERE: PLANO
OD_AXIS: 070
OS_CYLINDER: -1.25
OS_AXIS: 079
OD_CYLINDER: -0.50
OS_SPHERE: PLANO

## 2023-09-05 ASSESSMENT — SPHEQUIV_DERIVED
OS_SPHEQUIV: -0.375
OD_SPHEQUIV: -0.375

## 2023-09-05 ASSESSMENT — LID POSITION - DERMATOCHALASIS
OS_DERMATOCHALASIS: LUL 1+ 2+
OD_DERMATOCHALASIS: RUL 1+ 2+

## 2023-09-05 ASSESSMENT — AXIALLENGTH_DERIVED
OD_AL: 23.8323
OS_AL: 23.7389

## 2023-09-06 LAB
CULTURE RESULTS: SIGNIFICANT CHANGE UP
CULTURE RESULTS: SIGNIFICANT CHANGE UP
SPECIMEN SOURCE: SIGNIFICANT CHANGE UP
SPECIMEN SOURCE: SIGNIFICANT CHANGE UP

## 2023-09-08 ENCOUNTER — APPOINTMENT (OUTPATIENT)
Dept: FAMILY MEDICINE | Facility: CLINIC | Age: 73
End: 2023-09-08
Payer: MEDICARE

## 2023-09-08 VITALS
BODY MASS INDEX: 23.22 KG/M2 | SYSTOLIC BLOOD PRESSURE: 102 MMHG | DIASTOLIC BLOOD PRESSURE: 66 MMHG | OXYGEN SATURATION: 96 % | WEIGHT: 136 LBS | HEIGHT: 64 IN | HEART RATE: 65 BPM

## 2023-09-08 DIAGNOSIS — Z00.00 ENCOUNTER FOR GENERAL ADULT MEDICAL EXAMINATION W/OUT ABNORMAL FINDINGS: ICD-10-CM

## 2023-09-08 DIAGNOSIS — R73.01 IMPAIRED FASTING GLUCOSE: ICD-10-CM

## 2023-09-08 PROCEDURE — G0439: CPT

## 2023-09-08 PROCEDURE — 36415 COLL VENOUS BLD VENIPUNCTURE: CPT

## 2023-09-08 RX ORDER — ALENDRONATE SODIUM 70 MG/1
70 TABLET ORAL
Refills: 0 | Status: ACTIVE | COMMUNITY

## 2023-09-08 NOTE — HEALTH RISK ASSESSMENT
[Little interest or pleasure doing things] : 1) Little interest or pleasure doing things [Feeling down, depressed, or hopeless] : 2) Feeling down, depressed, or hopeless [0] : 2) Feeling down, depressed, or hopeless: Not at all (0) [PHQ-2 Negative - No further assessment needed] : PHQ-2 Negative - No further assessment needed [Alone] : lives alone [Retired] : retired [College] : College [Single] : single [Never] : Never [Very Good] : ~his/her~  mood as very good [Yes] : Yes [2 - 3 times a week (3 pts)] : 2 - 3  times a week (3 points) [1 or 2 (0 pts)] : 1 or 2 (0 points) [Never (0 pts)] : Never (0 points) [No] : In the past 12 months have you used drugs other than those required for medical reasons? No [No falls in past year] : Patient reported no falls in the past year [HIV Test offered] : HIV Test offered [Hepatitis C test offered] : Hepatitis C test offered [None] : None [Feels Safe at Home] : Feels safe at home [Fully functional (bathing, dressing, toileting, transferring, walking, feeding)] : Fully functional (bathing, dressing, toileting, transferring, walking, feeding) [Fully functional (using the telephone, shopping, preparing meals, housekeeping, doing laundry, using] : Fully functional and needs no help or supervision to perform IADLs (using the telephone, shopping, preparing meals, housekeeping, doing laundry, using transportation, managing medications and managing finances) [Reports normal functional visual acuity (ie: able to read med bottle)] : Reports normal functional visual acuity [Smoke Detector] : smoke detector [Carbon Monoxide Detector] : carbon monoxide detector [Safety elements used in home] : safety elements used in home [Seat Belt] :  uses seat belt [Sunscreen] : uses sunscreen [With Patient/Caregiver] : , with patient/caregiver [Designated Healthcare Proxy] : Designated healthcare proxy [Name: ___] : Health Care Proxy's Name: [unfilled]  [Relationship: ___] : Relationship: [unfilled] [Audit-CScore] : 3 [de-identified] : PICKLE BALL, YOGA, WALKING, GOLF [de-identified] : VERY HEALTHY [[id number]] : 0 [Change in mental status noted] : No change in mental status noted [Language] : denies difficulty with language [Learning/Retaining New Information] : denies difficulty learning/retaining new information [Handling Complex Tasks] : denies difficulty handling complex tasks [Reports changes in hearing] : Reports no changes in hearing [Reports changes in vision] : Reports no changes in vision [Reports changes in dental health] : Reports no changes in dental health [MammogramDate] : 10/22 [PapSmearDate] : 08/22 [BoneDensityDate] : 10/22 [ColonoscopyDate] : 09/20 [de-identified] : JUST SAW OPHTHALMOLOGY TWO DAYS AGO, GLASSES FOR READING AND DISTANCE [AdvancecareDate] : 09/23 [FreeTextEntry4] : Secondary Health Care Proxy: Brother; Diego Champagne

## 2023-09-08 NOTE — HISTORY OF PRESENT ILLNESS
Minoxidil Pregnancy And Lactation Text: This medication has not been assigned a Pregnancy Risk Category but animal studies failed to show danger with the topical medication. It is unknown if the medication is excreted in breast milk. [FreeTextEntry1] : wellness exam [de-identified] : MS. BURKS IS A PLEASANT 71 YO PRESENTING FOR YEARLY WELLNESS EXAM. CHRONIC HISTORY OF CAD, ASTHMA, HYPERCHOLESTEROLEMIA, OSTEOPENIA, MITRAL VALVE PROLAPSE WITH SEVERE MRI S/P BIO MVR, KASIE LIGATION AND MAZE APRIL 2019 WITH POST-OP ATRIAL FIBRILLATION. FOLLOWS ROUTINELY WITH CARDIOLOGY AND PULMONOLOGY.   DERMATOLOGY: DR. BAILEY RHEUMATOLOGY: ON FOSAMAX FOR OSTEOPENIA; [place] PROVIDER

## 2023-09-09 PROBLEM — H52.03 HYPEROPIA ; BOTH EYES: Status: ACTIVE | Noted: 2023-09-07

## 2023-09-10 LAB
ALBUMIN SERPL ELPH-MCNC: 4.2 G/DL
ALP BLD-CCNC: 76 U/L
ALT SERPL-CCNC: 22 U/L
ANION GAP SERPL CALC-SCNC: 12 MMOL/L
APPEARANCE: CLEAR
AST SERPL-CCNC: 27 U/L
BASOPHILS # BLD AUTO: 0.05 K/UL
BASOPHILS NFR BLD AUTO: 0.8 %
BILIRUB SERPL-MCNC: 0.3 MG/DL
BILIRUBIN URINE: NEGATIVE
BLOOD URINE: NEGATIVE
BUN SERPL-MCNC: 15 MG/DL
CALCIUM SERPL-MCNC: 10.3 MG/DL
CHLORIDE SERPL-SCNC: 104 MMOL/L
CHOLEST SERPL-MCNC: 193 MG/DL
CO2 SERPL-SCNC: 21 MMOL/L
COLOR: YELLOW
CREAT SERPL-MCNC: 0.56 MG/DL
EGFR: 96 ML/MIN/1.73M2
EOSINOPHIL # BLD AUTO: 0.21 K/UL
EOSINOPHIL NFR BLD AUTO: 3.2 %
GLUCOSE QUALITATIVE U: NEGATIVE MG/DL
GLUCOSE SERPL-MCNC: 92 MG/DL
HCT VFR BLD CALC: 44.4 %
HDLC SERPL-MCNC: 58 MG/DL
HGB BLD-MCNC: 13.8 G/DL
IMM GRANULOCYTES NFR BLD AUTO: 0.8 %
KETONES URINE: NEGATIVE MG/DL
LDLC SERPL CALC-MCNC: 118 MG/DL
LEUKOCYTE ESTERASE URINE: NEGATIVE
LYMPHOCYTES # BLD AUTO: 2.61 K/UL
LYMPHOCYTES NFR BLD AUTO: 39.5 %
MAN DIFF?: NORMAL
MCHC RBC-ENTMCNC: 28.9 PG
MCHC RBC-ENTMCNC: 31.1 GM/DL
MCV RBC AUTO: 93.1 FL
MONOCYTES # BLD AUTO: 0.55 K/UL
MONOCYTES NFR BLD AUTO: 8.3 %
NEUTROPHILS # BLD AUTO: 3.14 K/UL
NEUTROPHILS NFR BLD AUTO: 47.4 %
NITRITE URINE: NEGATIVE
NONHDLC SERPL-MCNC: 134 MG/DL
PH URINE: 6.5
PLATELET # BLD AUTO: 350 K/UL
POTASSIUM SERPL-SCNC: 4.6 MMOL/L
PROT SERPL-MCNC: 7.8 G/DL
PROTEIN URINE: NEGATIVE MG/DL
RBC # BLD: 4.77 M/UL
RBC # FLD: 13.2 %
SODIUM SERPL-SCNC: 137 MMOL/L
SPECIFIC GRAVITY URINE: 1.01
T4 FREE SERPL-MCNC: 1.2 NG/DL
TRIGL SERPL-MCNC: 91 MG/DL
TSH SERPL-ACNC: 2.22 UIU/ML
UROBILINOGEN URINE: 0.2 MG/DL
WBC # FLD AUTO: 6.61 K/UL

## 2023-09-11 LAB
ESTIMATED AVERAGE GLUCOSE: 123 MG/DL
HBA1C MFR BLD HPLC: 5.9 %

## 2023-09-11 NOTE — PHYSICAL THERAPY INITIAL EVALUATION ADULT - MANUAL MUSCLE TESTING RESULTS, REHAB EVAL
Doing well without complaints.  Anticoagulation and antiarrhythmics per Cardiology.  Plan per Cardiology.  
grossly assessed due to/Strength is grossly at least 3/5 throughout

## 2023-09-14 LAB — HEMOCCULT STL QL IA: NEGATIVE

## 2023-09-23 ENCOUNTER — TRANSCRIPTION ENCOUNTER (OUTPATIENT)
Age: 73
End: 2023-09-23

## 2023-09-26 ENCOUNTER — APPOINTMENT (OUTPATIENT)
Dept: DERMATOLOGY | Facility: CLINIC | Age: 73
End: 2023-09-26

## 2023-09-26 ENCOUNTER — RX RENEWAL (OUTPATIENT)
Age: 73
End: 2023-09-26

## 2023-09-28 ENCOUNTER — RX RENEWAL (OUTPATIENT)
Age: 73
End: 2023-09-28

## 2023-10-02 ENCOUNTER — APPOINTMENT (OUTPATIENT)
Dept: CARDIOLOGY | Facility: CLINIC | Age: 73
End: 2023-10-02
Payer: MEDICARE

## 2023-10-02 PROCEDURE — 93306 TTE W/DOPPLER COMPLETE: CPT

## 2023-10-03 ENCOUNTER — APPOINTMENT (OUTPATIENT)
Dept: PULMONOLOGY | Facility: CLINIC | Age: 73
End: 2023-10-03
Payer: MEDICARE

## 2023-10-03 VITALS
OXYGEN SATURATION: 96 % | SYSTOLIC BLOOD PRESSURE: 118 MMHG | RESPIRATION RATE: 16 BRPM | DIASTOLIC BLOOD PRESSURE: 62 MMHG | HEART RATE: 65 BPM

## 2023-10-03 VITALS — BODY MASS INDEX: 24.58 KG/M2 | WEIGHT: 137 LBS | HEIGHT: 62.5 IN

## 2023-10-03 DIAGNOSIS — R05.9 COUGH, UNSPECIFIED: ICD-10-CM

## 2023-10-03 DIAGNOSIS — J45.30 MILD PERSISTENT ASTHMA, UNCOMPLICATED: ICD-10-CM

## 2023-10-03 PROCEDURE — 94010 BREATHING CAPACITY TEST: CPT

## 2023-10-03 PROCEDURE — 99214 OFFICE O/P EST MOD 30 MIN: CPT | Mod: 25

## 2023-10-05 PROBLEM — Z12.39 BREAST SCREENING: Status: ACTIVE | Noted: 2022-08-02

## 2023-10-05 PROBLEM — Z12.4 CERVICAL CANCER SCREENING: Status: ACTIVE | Noted: 2022-08-02

## 2023-10-05 PROBLEM — R92.30 DENSE BREAST TISSUE ON MAMMOGRAM: Status: ACTIVE | Noted: 2020-01-22

## 2023-10-05 PROBLEM — Z01.419 VISIT FOR GYNECOLOGIC EXAMINATION: Status: ACTIVE | Noted: 2022-08-02

## 2023-10-09 ENCOUNTER — APPOINTMENT (OUTPATIENT)
Dept: ULTRASOUND IMAGING | Facility: CLINIC | Age: 73
End: 2023-10-09
Payer: MEDICARE

## 2023-10-09 ENCOUNTER — APPOINTMENT (OUTPATIENT)
Dept: CARDIOLOGY | Facility: CLINIC | Age: 73
End: 2023-10-09
Payer: MEDICARE

## 2023-10-09 ENCOUNTER — RESULT REVIEW (OUTPATIENT)
Age: 73
End: 2023-10-09

## 2023-10-09 ENCOUNTER — APPOINTMENT (OUTPATIENT)
Dept: MAMMOGRAPHY | Facility: CLINIC | Age: 73
End: 2023-10-09
Payer: MEDICARE

## 2023-10-09 ENCOUNTER — OUTPATIENT (OUTPATIENT)
Dept: OUTPATIENT SERVICES | Facility: HOSPITAL | Age: 73
LOS: 1 days | End: 2023-10-09
Payer: MEDICARE

## 2023-10-09 ENCOUNTER — NON-APPOINTMENT (OUTPATIENT)
Age: 73
End: 2023-10-09

## 2023-10-09 VITALS
SYSTOLIC BLOOD PRESSURE: 130 MMHG | WEIGHT: 140 LBS | RESPIRATION RATE: 16 BRPM | HEART RATE: 69 BPM | DIASTOLIC BLOOD PRESSURE: 80 MMHG | HEIGHT: 62.5 IN | OXYGEN SATURATION: 96 % | BODY MASS INDEX: 25.12 KG/M2

## 2023-10-09 DIAGNOSIS — I48.0 PAROXYSMAL ATRIAL FIBRILLATION: ICD-10-CM

## 2023-10-09 DIAGNOSIS — Z95.2 PRESENCE OF PROSTHETIC HEART VALVE: ICD-10-CM

## 2023-10-09 DIAGNOSIS — Z98.890 OTHER SPECIFIED POSTPROCEDURAL STATES: ICD-10-CM

## 2023-10-09 DIAGNOSIS — Z12.39 ENCOUNTER FOR OTHER SCREENING FOR MALIGNANT NEOPLASM OF BREAST: ICD-10-CM

## 2023-10-09 DIAGNOSIS — I49.3 VENTRICULAR PREMATURE DEPOLARIZATION: ICD-10-CM

## 2023-10-09 DIAGNOSIS — I34.1 NONRHEUMATIC MITRAL (VALVE) PROLAPSE: ICD-10-CM

## 2023-10-09 DIAGNOSIS — I25.10 ATHEROSCLEROTIC HEART DISEASE OF NATIVE CORONARY ARTERY W/OUT ANGINA PECTORIS: ICD-10-CM

## 2023-10-09 DIAGNOSIS — Z98.890 OTHER SPECIFIED POSTPROCEDURAL STATES: Chronic | ICD-10-CM

## 2023-10-09 PROCEDURE — 77067 SCR MAMMO BI INCL CAD: CPT

## 2023-10-09 PROCEDURE — 77063 BREAST TOMOSYNTHESIS BI: CPT | Mod: 26

## 2023-10-09 PROCEDURE — 77067 SCR MAMMO BI INCL CAD: CPT | Mod: 26

## 2023-10-09 PROCEDURE — 93000 ELECTROCARDIOGRAM COMPLETE: CPT

## 2023-10-09 PROCEDURE — 77063 BREAST TOMOSYNTHESIS BI: CPT

## 2023-10-09 PROCEDURE — 76641 ULTRASOUND BREAST COMPLETE: CPT | Mod: 26,50,GY

## 2023-10-09 PROCEDURE — 76641 ULTRASOUND BREAST COMPLETE: CPT

## 2023-10-09 PROCEDURE — 99214 OFFICE O/P EST MOD 30 MIN: CPT

## 2023-10-09 RX ORDER — SIMVASTATIN 10 MG/1
10 TABLET, FILM COATED ORAL
Qty: 90 | Refills: 3 | Status: ACTIVE | COMMUNITY
Start: 2019-08-23 | End: 1900-01-01

## 2023-10-09 RX ORDER — METOPROLOL SUCCINATE 50 MG/1
50 TABLET, EXTENDED RELEASE ORAL
Qty: 180 | Refills: 3 | Status: ACTIVE | COMMUNITY
Start: 2023-07-27 | End: 1900-01-01

## 2023-10-13 NOTE — PATIENT PROFILE ADULT - NSPROMEDSPATCH_GEN_A_NUR
Regarding: Blood in urine and burning  ----- Message from Andrea Romero sent at 10/13/2023  9:13 AM EDT -----  Pt called and stated that she has a burning and a little bit of blood in her urine for the last couple of days. Today was more noticeable. She is not sure what to do.  She has an appointment  on 11/07/23    Pt can be reached at [phone] none

## 2023-10-25 ENCOUNTER — APPOINTMENT (OUTPATIENT)
Dept: DERMATOLOGY | Facility: CLINIC | Age: 73
End: 2023-10-25
Payer: MEDICARE

## 2023-10-25 PROCEDURE — 99213 OFFICE O/P EST LOW 20 MIN: CPT

## 2024-01-16 NOTE — PATIENT PROFILE ADULT - NSPROGENDIFFINTUB_GEN_A_NUR
Patient's mother  informed of negative COVID and Flu results as well as POSITIVE RSV results.  Supportive home care reviewed.  S/Sx that would warrant ER evaluation reviewed.  Mother verbalizes understanding.  All questions answered.      previously intubated - no problems

## 2024-04-15 ENCOUNTER — RX RENEWAL (OUTPATIENT)
Age: 74
End: 2024-04-15

## 2024-04-15 RX ORDER — FLUTICASONE FUROATE 100 UG/1
100 POWDER RESPIRATORY (INHALATION) DAILY
Qty: 90 | Refills: 0 | Status: ACTIVE | COMMUNITY
Start: 2020-10-12 | End: 1900-01-01

## 2024-05-14 ENCOUNTER — RESULT REVIEW (OUTPATIENT)
Age: 74
End: 2024-05-14

## 2024-05-14 ENCOUNTER — APPOINTMENT (OUTPATIENT)
Dept: RADIOLOGY | Facility: CLINIC | Age: 74
End: 2024-05-14
Payer: MEDICARE

## 2024-05-14 ENCOUNTER — OUTPATIENT (OUTPATIENT)
Dept: OUTPATIENT SERVICES | Facility: HOSPITAL | Age: 74
LOS: 1 days | End: 2024-05-14
Payer: MEDICARE

## 2024-05-14 DIAGNOSIS — D24.2 BENIGN NEOPLASM OF LEFT BREAST: Chronic | ICD-10-CM

## 2024-05-14 DIAGNOSIS — Z13.820 ENCOUNTER FOR SCREENING FOR OSTEOPOROSIS: ICD-10-CM

## 2024-05-14 DIAGNOSIS — Z98.890 OTHER SPECIFIED POSTPROCEDURAL STATES: Chronic | ICD-10-CM

## 2024-05-14 DIAGNOSIS — H26.9 UNSPECIFIED CATARACT: Chronic | ICD-10-CM

## 2024-05-14 PROCEDURE — 77080 DXA BONE DENSITY AXIAL: CPT

## 2024-05-14 PROCEDURE — 77080 DXA BONE DENSITY AXIAL: CPT | Mod: 26

## 2024-09-06 ENCOUNTER — NON-APPOINTMENT (OUTPATIENT)
Age: 74
End: 2024-09-06

## 2024-09-10 ENCOUNTER — LABORATORY RESULT (OUTPATIENT)
Age: 74
End: 2024-09-10

## 2024-09-10 ENCOUNTER — OFFICE (OUTPATIENT)
Dept: URBAN - METROPOLITAN AREA CLINIC 6 | Facility: CLINIC | Age: 74
Setting detail: OPHTHALMOLOGY
End: 2024-09-10
Payer: MEDICARE

## 2024-09-10 ENCOUNTER — APPOINTMENT (OUTPATIENT)
Dept: FAMILY MEDICINE | Facility: CLINIC | Age: 74
End: 2024-09-10

## 2024-09-10 VITALS
OXYGEN SATURATION: 96 % | BODY MASS INDEX: 25.12 KG/M2 | HEART RATE: 58 BPM | WEIGHT: 140 LBS | SYSTOLIC BLOOD PRESSURE: 128 MMHG | DIASTOLIC BLOOD PRESSURE: 74 MMHG | HEIGHT: 62.5 IN

## 2024-09-10 DIAGNOSIS — H16.223: ICD-10-CM

## 2024-09-10 DIAGNOSIS — H02.831: ICD-10-CM

## 2024-09-10 DIAGNOSIS — H35.372: ICD-10-CM

## 2024-09-10 DIAGNOSIS — H11.152: ICD-10-CM

## 2024-09-10 DIAGNOSIS — H43.393: ICD-10-CM

## 2024-09-10 DIAGNOSIS — Z96.1: ICD-10-CM

## 2024-09-10 DIAGNOSIS — H02.834: ICD-10-CM

## 2024-09-10 DIAGNOSIS — Z00.00 ENCOUNTER FOR GENERAL ADULT MEDICAL EXAMINATION W/OUT ABNORMAL FINDINGS: ICD-10-CM

## 2024-09-10 PROCEDURE — G0439: CPT

## 2024-09-10 PROCEDURE — 92134 CPTRZ OPH DX IMG PST SGM RTA: CPT | Performed by: OPHTHALMOLOGY

## 2024-09-10 PROCEDURE — 92014 COMPRE OPH EXAM EST PT 1/>: CPT | Performed by: OPHTHALMOLOGY

## 2024-09-10 RX ORDER — CALCIUM CITRATE/VITAMIN D3 315MG-6.25
TABLET ORAL
Refills: 0 | Status: ACTIVE | COMMUNITY

## 2024-09-10 ASSESSMENT — CONFRONTATIONAL VISUAL FIELD TEST (CVF)
OD_FINDINGS: FULL
OS_FINDINGS: FULL

## 2024-09-10 ASSESSMENT — LID POSITION - DERMATOCHALASIS
OS_DERMATOCHALASIS: LUL 1+ 2+
OD_DERMATOCHALASIS: RUL 1+ 2+

## 2024-09-10 NOTE — PHYSICAL EXAM
[No Acute Distress] : no acute distress [Well Nourished] : well nourished [Well-Appearing] : well-appearing [Normal Sclera/Conjunctiva] : normal sclera/conjunctiva [PERRL] : pupils equal round and reactive to light [Normal Outer Ear/Nose] : the outer ears and nose were normal in appearance [Normal Oropharynx] : the oropharynx was normal [Normal TMs] : both tympanic membranes were normal [No Respiratory Distress] : no respiratory distress  [No Accessory Muscle Use] : no accessory muscle use [Clear to Auscultation] : lungs were clear to auscultation bilaterally [Normal Rate] : normal rate  [Regular Rhythm] : with a regular rhythm [Normal S1, S2] : normal S1 and S2 [Soft] : abdomen soft [Non Tender] : non-tender [Normal Bowel Sounds] : normal bowel sounds [No Joint Swelling] : no joint swelling [Grossly Normal Strength/Tone] : grossly normal strength/tone [Coordination Grossly Intact] : coordination grossly intact [No Focal Deficits] : no focal deficits [Normal Gait] : normal gait [Deep Tendon Reflexes (DTR)] : deep tendon reflexes were 2+ and symmetric [Speech Grossly Normal] : speech grossly normal [Normal Mood] : the mood was normal

## 2024-09-10 NOTE — HEALTH RISK ASSESSMENT
[Very Good] : ~his/her~  mood as very good [Yes] : Yes [2 - 4 times a month (2 pts)] : 2-4 times a month (2 points) [1 or 2 (0 pts)] : 1 or 2 (0 points) [Never (0 pts)] : Never (0 points) [No] : In the past 12 months have you used drugs other than those required for medical reasons? No [No falls in past year] : Patient reported no falls in the past year [Little interest or pleasure doing things] : 1) Little interest or pleasure doing things [Feeling down, depressed, or hopeless] : 2) Feeling down, depressed, or hopeless [0] : 2) Feeling down, depressed, or hopeless: Not at all (0) [PHQ-2 Negative - No further assessment needed] : PHQ-2 Negative - No further assessment needed [Never] : Never [NO] : No [HIV test declined] : HIV test declined [Hepatitis C test declined] : Hepatitis C test declined [None] : None [Alone] : lives alone [Retired] : retired [College] : College [Single] : single [Feels Safe at Home] : Feels safe at home [Fully functional (bathing, dressing, toileting, transferring, walking, feeding)] : Fully functional (bathing, dressing, toileting, transferring, walking, feeding) [Fully functional (using the telephone, shopping, preparing meals, housekeeping, doing laundry, using] : Fully functional and needs no help or supervision to perform IADLs (using the telephone, shopping, preparing meals, housekeeping, doing laundry, using transportation, managing medications and managing finances) [Reports normal functional visual acuity (ie: able to read med bottle)] : Reports normal functional visual acuity [Smoke Detector] : smoke detector [Carbon Monoxide Detector] : carbon monoxide detector [Safety elements used in home] : safety elements used in home [Seat Belt] :  uses seat belt [Sunscreen] : uses sunscreen [With Patient/Caregiver] : , with patient/caregiver [Designated Healthcare Proxy] : Designated healthcare proxy [Name: ___] : Health Care Proxy's Name: [unfilled]  [Relationship: ___] : Relationship: [unfilled] [Audit-CScore] : 2 [de-identified] : WALKING, YOGA. WATER AEROBICS, PICKLEBALL [de-identified] : "PRETTY GOOD" [[id number]] : 0 [Change in mental status noted] : No change in mental status noted [Language] : denies difficulty with language [Learning/Retaining New Information] : denies difficulty learning/retaining new information [Sexually Active] : not sexually active [High Risk Behavior] : no high risk behavior [Reports changes in hearing] : Reports no changes in hearing [Reports changes in dental health] : Reports no changes in dental health [Travel to Developing Areas] : does not  travel to developing areas [TB Exposure] : is not being exposed to tuberculosis [Caregiver Concerns] : does not have caregiver concerns [MammogramDate] : 10/23 [PapSmearDate] : 08/22 [BoneDensityDate] : 05/24 [ColonoscopyDate] : 09/20 [de-identified] : APPOINTMENT TODAY; GLASSES FOR DISTANCE AND READING [AdvancecareDate] : 09/24

## 2024-09-10 NOTE — PLAN
[FreeTextEntry1] : VISION SCREENING SAFETY / HEALTHY HABITS REVIEWED HEALTHY DIET AND LIFESTYLE MODIFICATIONS VACCINATIONS DISCUSSED: FLU HAD RSV VACCINATION AND NEW COVID VACCINATION CHECK ROUTINE LAB WORK WILL HAVE EKG WITH CARDIOLOGY AT [facility] APPOINTMENT FOLLOW-UP ALL SPECIALISTS AS DIRECTED CALL WITH ANY QUESTIONS, CONCERNS OR CHANGES

## 2024-09-13 ENCOUNTER — RX RENEWAL (OUTPATIENT)
Age: 74
End: 2024-09-13

## 2024-09-16 DIAGNOSIS — E83.52 HYPERCALCEMIA: ICD-10-CM

## 2024-09-16 LAB
25(OH)D3 SERPL-MCNC: 50.2 NG/ML
APPEARANCE: CLEAR
BASOPHILS # BLD AUTO: 0.05 K/UL
BASOPHILS NFR BLD AUTO: 0.9 %
BILIRUBIN URINE: NEGATIVE
BLOOD URINE: NEGATIVE
CHOLEST SERPL-MCNC: 186 MG/DL
COLOR: YELLOW
EOSINOPHIL # BLD AUTO: 0.24 K/UL
EOSINOPHIL NFR BLD AUTO: 4.1 %
ESTIMATED AVERAGE GLUCOSE: 120 MG/DL
GLUCOSE QUALITATIVE U: NEGATIVE MG/DL
HBA1C MFR BLD HPLC: 5.8 %
HCT VFR BLD CALC: 47.5 %
HDLC SERPL-MCNC: 73 MG/DL
HGB BLD-MCNC: 14.9 G/DL
IMM GRANULOCYTES NFR BLD AUTO: 0.2 %
KETONES URINE: NEGATIVE MG/DL
LDLC SERPL CALC-MCNC: 100 MG/DL
LEUKOCYTE ESTERASE URINE: ABNORMAL
LYMPHOCYTES # BLD AUTO: 2.58 K/UL
LYMPHOCYTES NFR BLD AUTO: 44.1 %
MAN DIFF?: NORMAL
MCHC RBC-ENTMCNC: 29.8 PG
MCHC RBC-ENTMCNC: 31.4 GM/DL
MCV RBC AUTO: 95 FL
MONOCYTES # BLD AUTO: 0.4 K/UL
MONOCYTES NFR BLD AUTO: 6.8 %
NEUTROPHILS # BLD AUTO: 2.57 K/UL
NEUTROPHILS NFR BLD AUTO: 43.9 %
NITRITE URINE: NEGATIVE
NONHDLC SERPL-MCNC: 113 MG/DL
PH URINE: 6.5
PLATELET # BLD AUTO: 257 K/UL
PROTEIN URINE: NORMAL MG/DL
RBC # BLD: 5 M/UL
RBC # FLD: 14 %
SPECIFIC GRAVITY URINE: 1.02
T4 FREE SERPL-MCNC: 1.4 NG/DL
TRIGL SERPL-MCNC: 66 MG/DL
TSH SERPL-ACNC: 2.83 UIU/ML
UROBILINOGEN URINE: 0.2 MG/DL
WBC # FLD AUTO: 5.85 K/UL

## 2024-09-17 LAB
ALBUMIN SERPL ELPH-MCNC: 4.4 G/DL
ALP BLD-CCNC: 78 U/L
ALT SERPL-CCNC: 17 U/L
ANION GAP SERPL CALC-SCNC: 15 MMOL/L
AST SERPL-CCNC: 27 U/L
BILIRUB SERPL-MCNC: 0.6 MG/DL
BUN SERPL-MCNC: 13 MG/DL
CALCIUM SERPL-MCNC: 11 MG/DL
CHLORIDE SERPL-SCNC: 104 MMOL/L
CO2 SERPL-SCNC: 20 MMOL/L
CREAT SERPL-MCNC: 0.63 MG/DL
EGFR: 93 ML/MIN/1.73M2
GLUCOSE SERPL-MCNC: 87 MG/DL
HEMOCCULT STL QL IA: NEGATIVE
POTASSIUM SERPL-SCNC: 4.9 MMOL/L
PROT SERPL-MCNC: 8 G/DL
SODIUM SERPL-SCNC: 138 MMOL/L

## 2024-09-22 LAB
25(OH)D3 SERPL-MCNC: 39.3 NG/ML
CALCIUM SERPL-MCNC: 9.7 MG/DL
PARATHYROID HORMONE INTACT: 55 PG/ML

## 2024-09-23 LAB
ALBUMIN SERPL ELPH-MCNC: 4.1 G/DL
ALP BLD-CCNC: 71 U/L
ALT SERPL-CCNC: 19 U/L
ANION GAP SERPL CALC-SCNC: 10 MMOL/L
AST SERPL-CCNC: 24 U/L
BILIRUB SERPL-MCNC: 0.7 MG/DL
BUN SERPL-MCNC: 11 MG/DL
CALCIUM SERPL-MCNC: 9.7 MG/DL
CHLORIDE SERPL-SCNC: 106 MMOL/L
CO2 SERPL-SCNC: 23 MMOL/L
CREAT SERPL-MCNC: 0.59 MG/DL
EGFR: 95 ML/MIN/1.73M2
GLUCOSE SERPL-MCNC: 95 MG/DL
POTASSIUM SERPL-SCNC: 4.3 MMOL/L
PROT SERPL-MCNC: 7.6 G/DL
SODIUM SERPL-SCNC: 139 MMOL/L

## 2024-10-10 ENCOUNTER — APPOINTMENT (OUTPATIENT)
Dept: ULTRASOUND IMAGING | Facility: CLINIC | Age: 74
End: 2024-10-10

## 2024-10-10 ENCOUNTER — RESULT REVIEW (OUTPATIENT)
Age: 74
End: 2024-10-10

## 2024-10-10 ENCOUNTER — APPOINTMENT (OUTPATIENT)
Dept: MAMMOGRAPHY | Facility: CLINIC | Age: 74
End: 2024-10-10

## 2024-10-10 ENCOUNTER — OUTPATIENT (OUTPATIENT)
Dept: OUTPATIENT SERVICES | Facility: HOSPITAL | Age: 74
LOS: 1 days | End: 2024-10-10
Payer: MEDICARE

## 2024-10-10 DIAGNOSIS — D24.2 BENIGN NEOPLASM OF LEFT BREAST: Chronic | ICD-10-CM

## 2024-10-10 DIAGNOSIS — H26.9 UNSPECIFIED CATARACT: Chronic | ICD-10-CM

## 2024-10-10 DIAGNOSIS — R92.8 OTHER ABNORMAL AND INCONCLUSIVE FINDINGS ON DIAGNOSTIC IMAGING OF BREAST: ICD-10-CM

## 2024-10-10 DIAGNOSIS — Z98.890 OTHER SPECIFIED POSTPROCEDURAL STATES: Chronic | ICD-10-CM

## 2024-10-10 PROCEDURE — 76641 ULTRASOUND BREAST COMPLETE: CPT | Mod: 26,50,GZ

## 2024-10-10 PROCEDURE — 77063 BREAST TOMOSYNTHESIS BI: CPT | Mod: 26

## 2024-10-10 PROCEDURE — 77067 SCR MAMMO BI INCL CAD: CPT | Mod: 26

## 2024-10-11 ENCOUNTER — APPOINTMENT (OUTPATIENT)
Dept: PULMONOLOGY | Facility: CLINIC | Age: 74
End: 2024-10-11
Payer: MEDICARE

## 2024-10-11 VITALS
HEART RATE: 65 BPM | DIASTOLIC BLOOD PRESSURE: 62 MMHG | SYSTOLIC BLOOD PRESSURE: 110 MMHG | HEIGHT: 62.5 IN | BODY MASS INDEX: 25.3 KG/M2 | WEIGHT: 141 LBS | RESPIRATION RATE: 16 BRPM | OXYGEN SATURATION: 98 %

## 2024-10-11 DIAGNOSIS — J45.30 MILD PERSISTENT ASTHMA, UNCOMPLICATED: ICD-10-CM

## 2024-10-11 PROCEDURE — 99214 OFFICE O/P EST MOD 30 MIN: CPT

## 2024-10-11 PROCEDURE — G2211 COMPLEX E/M VISIT ADD ON: CPT

## 2024-10-14 ENCOUNTER — APPOINTMENT (OUTPATIENT)
Dept: CARDIOLOGY | Facility: CLINIC | Age: 74
End: 2024-10-14
Payer: MEDICARE

## 2024-10-14 ENCOUNTER — APPOINTMENT (OUTPATIENT)
Dept: OBGYN | Facility: CLINIC | Age: 74
End: 2024-10-14

## 2024-10-14 ENCOUNTER — LABORATORY RESULT (OUTPATIENT)
Age: 74
End: 2024-10-14

## 2024-10-14 VITALS
BODY MASS INDEX: 25.12 KG/M2 | DIASTOLIC BLOOD PRESSURE: 60 MMHG | SYSTOLIC BLOOD PRESSURE: 118 MMHG | HEIGHT: 62.5 IN | WEIGHT: 140 LBS

## 2024-10-14 DIAGNOSIS — Z12.4 ENCOUNTER FOR SCREENING FOR MALIGNANT NEOPLASM OF CERVIX: ICD-10-CM

## 2024-10-14 PROCEDURE — 99213 OFFICE O/P EST LOW 20 MIN: CPT

## 2024-10-14 PROCEDURE — 93306 TTE W/DOPPLER COMPLETE: CPT

## 2024-10-19 LAB — HPV HIGH+LOW RISK DNA PNL CVX: DETECTED

## 2024-10-20 LAB — CYTOLOGY CVX/VAG DOC THIN PREP: NORMAL

## 2024-10-21 ENCOUNTER — APPOINTMENT (OUTPATIENT)
Dept: DERMATOLOGY | Facility: CLINIC | Age: 74
End: 2024-10-21
Payer: MEDICARE

## 2024-10-21 PROCEDURE — 99213 OFFICE O/P EST LOW 20 MIN: CPT | Mod: 25

## 2024-10-21 PROCEDURE — 17000 DESTRUCT PREMALG LESION: CPT

## 2024-10-24 ENCOUNTER — APPOINTMENT (OUTPATIENT)
Dept: CARDIOLOGY | Facility: CLINIC | Age: 74
End: 2024-10-24
Payer: MEDICARE

## 2024-10-24 ENCOUNTER — NON-APPOINTMENT (OUTPATIENT)
Age: 74
End: 2024-10-24

## 2024-10-24 VITALS
HEART RATE: 60 BPM | BODY MASS INDEX: 23.9 KG/M2 | SYSTOLIC BLOOD PRESSURE: 118 MMHG | DIASTOLIC BLOOD PRESSURE: 78 MMHG | WEIGHT: 140 LBS | HEIGHT: 64 IN

## 2024-10-24 DIAGNOSIS — E78.00 PURE HYPERCHOLESTEROLEMIA, UNSPECIFIED: ICD-10-CM

## 2024-10-24 DIAGNOSIS — I47.29 OTHER VENTRICULAR TACHYCARDIA: ICD-10-CM

## 2024-10-24 DIAGNOSIS — I25.10 ATHEROSCLEROTIC HEART DISEASE OF NATIVE CORONARY ARTERY W/OUT ANGINA PECTORIS: ICD-10-CM

## 2024-10-24 DIAGNOSIS — Z98.890 OTHER SPECIFIED POSTPROCEDURAL STATES: ICD-10-CM

## 2024-10-24 DIAGNOSIS — I50.30 UNSPECIFIED DIASTOLIC (CONGESTIVE) HEART FAILURE: ICD-10-CM

## 2024-10-24 DIAGNOSIS — Z95.2 PRESENCE OF PROSTHETIC HEART VALVE: ICD-10-CM

## 2024-10-24 PROCEDURE — 99214 OFFICE O/P EST MOD 30 MIN: CPT

## 2024-10-24 PROCEDURE — 93000 ELECTROCARDIOGRAM COMPLETE: CPT

## 2024-10-24 PROCEDURE — G2211 COMPLEX E/M VISIT ADD ON: CPT

## 2024-11-20 PROCEDURE — 77067 SCR MAMMO BI INCL CAD: CPT

## 2024-11-20 PROCEDURE — 77063 BREAST TOMOSYNTHESIS BI: CPT

## 2024-11-20 PROCEDURE — 76641 ULTRASOUND BREAST COMPLETE: CPT

## 2024-11-21 NOTE — REASON FOR VISIT
Status post resection of adenocarcinoma followed up by the colorectal surgery no new problems        [Consultation] : a consultation visit [FreeTextEntry1] : left knee mass

## 2024-11-22 ENCOUNTER — TRANSCRIPTION ENCOUNTER (OUTPATIENT)
Age: 74
End: 2024-11-22

## 2024-11-27 ENCOUNTER — TRANSCRIPTION ENCOUNTER (OUTPATIENT)
Age: 74
End: 2024-11-27

## 2024-11-27 ENCOUNTER — RX RENEWAL (OUTPATIENT)
Age: 74
End: 2024-11-27

## 2024-12-03 ENCOUNTER — TRANSCRIPTION ENCOUNTER (OUTPATIENT)
Age: 74
End: 2024-12-03

## 2024-12-10 ENCOUNTER — TRANSCRIPTION ENCOUNTER (OUTPATIENT)
Age: 74
End: 2024-12-10

## 2025-01-15 NOTE — PROVIDER CONTACT NOTE (OTHER) - RECOMMENDATIONS
Patient ambulatory with tech assistance to restroom.   magnesium to be given, continue to monitor on tele

## 2025-05-05 ENCOUNTER — APPOINTMENT (OUTPATIENT)
Dept: CARDIOLOGY | Facility: CLINIC | Age: 75
End: 2025-05-05
Payer: MEDICARE

## 2025-05-05 ENCOUNTER — NON-APPOINTMENT (OUTPATIENT)
Age: 75
End: 2025-05-05

## 2025-05-05 ENCOUNTER — RX RENEWAL (OUTPATIENT)
Age: 75
End: 2025-05-05

## 2025-05-05 VITALS
WEIGHT: 140 LBS | BODY MASS INDEX: 24.8 KG/M2 | DIASTOLIC BLOOD PRESSURE: 78 MMHG | SYSTOLIC BLOOD PRESSURE: 128 MMHG | HEIGHT: 63 IN | HEART RATE: 69 BPM

## 2025-05-05 DIAGNOSIS — I48.0 PAROXYSMAL ATRIAL FIBRILLATION: ICD-10-CM

## 2025-05-05 DIAGNOSIS — I25.10 ATHEROSCLEROTIC HEART DISEASE OF NATIVE CORONARY ARTERY W/OUT ANGINA PECTORIS: ICD-10-CM

## 2025-05-05 DIAGNOSIS — Z95.2 PRESENCE OF PROSTHETIC HEART VALVE: ICD-10-CM

## 2025-05-05 DIAGNOSIS — I47.29 OTHER VENTRICULAR TACHYCARDIA: ICD-10-CM

## 2025-05-05 DIAGNOSIS — Z98.890 OTHER SPECIFIED POSTPROCEDURAL STATES: ICD-10-CM

## 2025-05-05 DIAGNOSIS — I49.3 VENTRICULAR PREMATURE DEPOLARIZATION: ICD-10-CM

## 2025-05-05 PROCEDURE — 93000 ELECTROCARDIOGRAM COMPLETE: CPT

## 2025-05-05 PROCEDURE — G2211 COMPLEX E/M VISIT ADD ON: CPT

## 2025-05-05 PROCEDURE — 99214 OFFICE O/P EST MOD 30 MIN: CPT

## 2025-05-06 ENCOUNTER — TRANSCRIPTION ENCOUNTER (OUTPATIENT)
Age: 75
End: 2025-05-06

## 2025-05-07 ENCOUNTER — TRANSCRIPTION ENCOUNTER (OUTPATIENT)
Age: 75
End: 2025-05-07

## 2025-05-07 RX ORDER — OMEPRAZOLE 20 MG/1
20 CAPSULE, DELAYED RELEASE ORAL
Qty: 90 | Refills: 1 | Status: ACTIVE | COMMUNITY
Start: 2025-01-31 | End: 1900-01-01

## 2025-05-13 ENCOUNTER — TRANSCRIPTION ENCOUNTER (OUTPATIENT)
Age: 75
End: 2025-05-13

## 2025-05-15 ENCOUNTER — TRANSCRIPTION ENCOUNTER (OUTPATIENT)
Age: 75
End: 2025-05-15

## 2025-06-16 ENCOUNTER — APPOINTMENT (OUTPATIENT)
Dept: CARDIOLOGY | Facility: CLINIC | Age: 75
End: 2025-06-16
Payer: MEDICARE

## 2025-06-16 PROCEDURE — A9500: CPT

## 2025-06-16 PROCEDURE — 93015 CV STRESS TEST SUPVJ I&R: CPT

## 2025-06-16 PROCEDURE — 78452 HT MUSCLE IMAGE SPECT MULT: CPT

## 2025-06-23 ENCOUNTER — APPOINTMENT (OUTPATIENT)
Dept: ELECTROPHYSIOLOGY | Facility: CLINIC | Age: 75
End: 2025-06-23

## 2025-07-01 ENCOUNTER — NON-APPOINTMENT (OUTPATIENT)
Age: 75
End: 2025-07-01

## 2025-07-01 ENCOUNTER — APPOINTMENT (OUTPATIENT)
Dept: ELECTROPHYSIOLOGY | Facility: CLINIC | Age: 75
End: 2025-07-01
Payer: MEDICARE

## 2025-07-01 VITALS
HEART RATE: 71 BPM | DIASTOLIC BLOOD PRESSURE: 68 MMHG | WEIGHT: 135 LBS | HEIGHT: 63 IN | OXYGEN SATURATION: 95 % | SYSTOLIC BLOOD PRESSURE: 126 MMHG | BODY MASS INDEX: 23.92 KG/M2

## 2025-07-01 PROCEDURE — 99205 OFFICE O/P NEW HI 60 MIN: CPT

## 2025-07-01 RX ORDER — ZOLEDRONIC ACID 5 MG/100ML
5 INJECTION, SOLUTION INTRAVENOUS
Refills: 0 | Status: ACTIVE | COMMUNITY
Start: 2025-07-01

## 2025-07-02 ENCOUNTER — APPOINTMENT (OUTPATIENT)
Dept: ELECTROPHYSIOLOGY | Facility: CLINIC | Age: 75
End: 2025-07-02

## 2025-07-14 ENCOUNTER — APPOINTMENT (OUTPATIENT)
Dept: PULMONOLOGY | Facility: CLINIC | Age: 75
End: 2025-07-14
Payer: MEDICARE

## 2025-07-14 VITALS
DIASTOLIC BLOOD PRESSURE: 66 MMHG | WEIGHT: 137 LBS | SYSTOLIC BLOOD PRESSURE: 124 MMHG | BODY MASS INDEX: 24.27 KG/M2 | HEART RATE: 67 BPM | OXYGEN SATURATION: 97 % | HEIGHT: 63 IN | RESPIRATION RATE: 16 BRPM

## 2025-07-14 PROCEDURE — 99214 OFFICE O/P EST MOD 30 MIN: CPT

## 2025-07-14 PROCEDURE — G2211 COMPLEX E/M VISIT ADD ON: CPT

## 2025-07-16 ENCOUNTER — APPOINTMENT (OUTPATIENT)
Dept: CARDIOLOGY | Facility: CLINIC | Age: 75
End: 2025-07-16
Payer: MEDICARE

## 2025-07-16 VITALS
DIASTOLIC BLOOD PRESSURE: 80 MMHG | SYSTOLIC BLOOD PRESSURE: 114 MMHG | HEART RATE: 59 BPM | BODY MASS INDEX: 24.27 KG/M2 | WEIGHT: 137 LBS | HEIGHT: 63 IN

## 2025-07-16 PROCEDURE — G2211 COMPLEX E/M VISIT ADD ON: CPT

## 2025-07-16 PROCEDURE — 99214 OFFICE O/P EST MOD 30 MIN: CPT

## 2025-07-28 PROCEDURE — 93244 EXT ECG>48HR<7D REV&INTERPJ: CPT

## 2025-09-12 ENCOUNTER — APPOINTMENT (OUTPATIENT)
Dept: FAMILY MEDICINE | Facility: CLINIC | Age: 75
End: 2025-09-12
Payer: MEDICARE

## 2025-09-12 VITALS — DIASTOLIC BLOOD PRESSURE: 84 MMHG | SYSTOLIC BLOOD PRESSURE: 124 MMHG

## 2025-09-12 VITALS
DIASTOLIC BLOOD PRESSURE: 82 MMHG | WEIGHT: 136 LBS | SYSTOLIC BLOOD PRESSURE: 142 MMHG | OXYGEN SATURATION: 96 % | HEART RATE: 64 BPM | HEIGHT: 63 IN | BODY MASS INDEX: 24.1 KG/M2

## 2025-09-12 DIAGNOSIS — Z80.8 FAMILY HISTORY OF MALIGNANT NEOPLASM OF OTHER ORGANS OR SYSTEMS: ICD-10-CM

## 2025-09-12 DIAGNOSIS — Z00.00 ENCOUNTER FOR GENERAL ADULT MEDICAL EXAMINATION W/OUT ABNORMAL FINDINGS: ICD-10-CM

## 2025-09-12 PROCEDURE — 36415 COLL VENOUS BLD VENIPUNCTURE: CPT

## 2025-09-12 PROCEDURE — G0439: CPT

## 2025-09-14 LAB
ALBUMIN SERPL ELPH-MCNC: 4.3 G/DL
ALP BLD-CCNC: 80 U/L
ALT SERPL-CCNC: 31 U/L
ANION GAP SERPL CALC-SCNC: 14 MMOL/L
APPEARANCE: CLEAR
AST SERPL-CCNC: 32 U/L
BASOPHILS # BLD AUTO: 0.04 K/UL
BASOPHILS NFR BLD AUTO: 0.7 %
BILIRUB SERPL-MCNC: 0.6 MG/DL
BILIRUBIN URINE: NEGATIVE
BLOOD URINE: NEGATIVE
BUN SERPL-MCNC: 10 MG/DL
CALCIUM SERPL-MCNC: 10 MG/DL
CHLORIDE SERPL-SCNC: 106 MMOL/L
CHOLEST SERPL-MCNC: 174 MG/DL
CO2 SERPL-SCNC: 20 MMOL/L
COLOR: YELLOW
CREAT SERPL-MCNC: 0.51 MG/DL
EGFRCR SERPLBLD CKD-EPI 2021: 97 ML/MIN/1.73M2
EOSINOPHIL # BLD AUTO: 0.32 K/UL
EOSINOPHIL NFR BLD AUTO: 5.7 %
ESTIMATED AVERAGE GLUCOSE: 114 MG/DL
GLUCOSE QUALITATIVE U: NEGATIVE MG/DL
GLUCOSE SERPL-MCNC: 86 MG/DL
HBA1C MFR BLD HPLC: 5.6 %
HCT VFR BLD CALC: 42.9 %
HDLC SERPL-MCNC: 73 MG/DL
HGB BLD-MCNC: 13.6 G/DL
IMM GRANULOCYTES NFR BLD AUTO: 0.4 %
KETONES URINE: NEGATIVE MG/DL
LDLC SERPL-MCNC: 91 MG/DL
LEUKOCYTE ESTERASE URINE: NEGATIVE
LYMPHOCYTES # BLD AUTO: 2.13 K/UL
LYMPHOCYTES NFR BLD AUTO: 38 %
MAN DIFF?: NORMAL
MCHC RBC-ENTMCNC: 29.4 PG
MCHC RBC-ENTMCNC: 31.7 G/DL
MCV RBC AUTO: 92.7 FL
MONOCYTES # BLD AUTO: 0.54 K/UL
MONOCYTES NFR BLD AUTO: 9.6 %
NEUTROPHILS # BLD AUTO: 2.56 K/UL
NEUTROPHILS NFR BLD AUTO: 45.6 %
NITRITE URINE: NEGATIVE
NONHDLC SERPL-MCNC: 101 MG/DL
PH URINE: 6.5
PLATELET # BLD AUTO: 227 K/UL
POTASSIUM SERPL-SCNC: 4.3 MMOL/L
PROT SERPL-MCNC: 7.4 G/DL
PROTEIN URINE: NEGATIVE MG/DL
RBC # BLD: 4.63 M/UL
RBC # FLD: 14.3 %
SODIUM SERPL-SCNC: 140 MMOL/L
SPECIFIC GRAVITY URINE: 1.02
T4 FREE SERPL-MCNC: 1.4 NG/DL
TRIGL SERPL-MCNC: 55 MG/DL
TSH SERPL-ACNC: 2.6 UIU/ML
UROBILINOGEN URINE: 0.2 MG/DL
WBC # FLD AUTO: 5.61 K/UL

## 2025-09-17 ENCOUNTER — NON-APPOINTMENT (OUTPATIENT)
Age: 75
End: 2025-09-17

## 2025-09-17 ENCOUNTER — APPOINTMENT (OUTPATIENT)
Dept: GASTROENTEROLOGY | Facility: CLINIC | Age: 75
End: 2025-09-17
Payer: MEDICARE

## 2025-09-17 VITALS
SYSTOLIC BLOOD PRESSURE: 123 MMHG | HEART RATE: 53 BPM | DIASTOLIC BLOOD PRESSURE: 68 MMHG | HEIGHT: 63 IN | WEIGHT: 138 LBS | BODY MASS INDEX: 24.45 KG/M2 | RESPIRATION RATE: 14 BRPM | OXYGEN SATURATION: 98 %

## 2025-09-17 DIAGNOSIS — K21.9 GASTRO-ESOPHAGEAL REFLUX DISEASE W/OUT ESOPHAGITIS: ICD-10-CM

## 2025-09-17 DIAGNOSIS — Z86.0100 PERSONAL HISTORY OF COLON POLYPS, UNSPECIFIED: ICD-10-CM

## 2025-09-17 DIAGNOSIS — Z71.89 OTHER SPECIFIED COUNSELING: ICD-10-CM

## 2025-09-17 DIAGNOSIS — Z71.9 COUNSELING, UNSPECIFIED: ICD-10-CM

## 2025-09-17 PROCEDURE — 99204 OFFICE O/P NEW MOD 45 MIN: CPT
